# Patient Record
Sex: MALE | Race: WHITE | NOT HISPANIC OR LATINO | URBAN - METROPOLITAN AREA
[De-identification: names, ages, dates, MRNs, and addresses within clinical notes are randomized per-mention and may not be internally consistent; named-entity substitution may affect disease eponyms.]

---

## 2017-02-05 ENCOUNTER — EMERGENCY (EMERGENCY)
Facility: HOSPITAL | Age: 57
LOS: 1 days | Discharge: HOME | End: 2017-02-05
Attending: EMERGENCY MEDICINE
Payer: COMMERCIAL

## 2017-02-05 VITALS
RESPIRATION RATE: 20 BRPM | OXYGEN SATURATION: 97 % | SYSTOLIC BLOOD PRESSURE: 115 MMHG | TEMPERATURE: 98 F | HEART RATE: 94 BPM | WEIGHT: 169.98 LBS | HEIGHT: 70 IN | DIASTOLIC BLOOD PRESSURE: 77 MMHG

## 2017-02-05 VITALS
RESPIRATION RATE: 17 BRPM | TEMPERATURE: 98 F | HEART RATE: 105 BPM | DIASTOLIC BLOOD PRESSURE: 63 MMHG | SYSTOLIC BLOOD PRESSURE: 166 MMHG | OXYGEN SATURATION: 96 %

## 2017-02-05 DIAGNOSIS — Z98.89 OTHER SPECIFIED POSTPROCEDURAL STATES: Chronic | ICD-10-CM

## 2017-02-05 DIAGNOSIS — R06.2 WHEEZING: ICD-10-CM

## 2017-02-05 DIAGNOSIS — Z87.19 PERSONAL HISTORY OF OTHER DISEASES OF THE DIGESTIVE SYSTEM: Chronic | ICD-10-CM

## 2017-02-05 DIAGNOSIS — Z98.890 OTHER SPECIFIED POSTPROCEDURAL STATES: ICD-10-CM

## 2017-02-05 DIAGNOSIS — E78.00 PURE HYPERCHOLESTEROLEMIA, UNSPECIFIED: ICD-10-CM

## 2017-02-05 DIAGNOSIS — Z95.828 PRESENCE OF OTHER VASCULAR IMPLANTS AND GRAFTS: Chronic | ICD-10-CM

## 2017-02-05 DIAGNOSIS — Z90.49 ACQUIRED ABSENCE OF OTHER SPECIFIED PARTS OF DIGESTIVE TRACT: ICD-10-CM

## 2017-02-05 DIAGNOSIS — E86.0 DEHYDRATION: ICD-10-CM

## 2017-02-05 DIAGNOSIS — Z90.49 ACQUIRED ABSENCE OF OTHER SPECIFIED PARTS OF DIGESTIVE TRACT: Chronic | ICD-10-CM

## 2017-02-05 DIAGNOSIS — I71.4 ABDOMINAL AORTIC ANEURYSM, WITHOUT RUPTURE: Chronic | ICD-10-CM

## 2017-02-05 DIAGNOSIS — Z79.82 LONG TERM (CURRENT) USE OF ASPIRIN: ICD-10-CM

## 2017-02-05 DIAGNOSIS — K66.0 PERITONEAL ADHESIONS (POSTPROCEDURAL) (POSTINFECTION): Chronic | ICD-10-CM

## 2017-02-05 DIAGNOSIS — R42 DIZZINESS AND GIDDINESS: ICD-10-CM

## 2017-02-05 DIAGNOSIS — R05 COUGH: ICD-10-CM

## 2017-02-05 DIAGNOSIS — I10 ESSENTIAL (PRIMARY) HYPERTENSION: ICD-10-CM

## 2017-02-05 LAB
ALBUMIN SERPL ELPH-MCNC: 4.5 G/DL — SIGNIFICANT CHANGE UP (ref 3.3–5.2)
ALP SERPL-CCNC: 103 U/L — SIGNIFICANT CHANGE UP (ref 40–120)
ALT FLD-CCNC: 13 U/L — SIGNIFICANT CHANGE UP
ANION GAP SERPL CALC-SCNC: 15 MMOL/L — SIGNIFICANT CHANGE UP (ref 5–17)
AST SERPL-CCNC: 19 U/L — SIGNIFICANT CHANGE UP
BASOPHILS # BLD AUTO: 0 K/UL — SIGNIFICANT CHANGE UP (ref 0–0.2)
BASOPHILS NFR BLD AUTO: 0.2 % — SIGNIFICANT CHANGE UP (ref 0–2)
BILIRUB SERPL-MCNC: 0.5 MG/DL — SIGNIFICANT CHANGE UP (ref 0.4–2)
BUN SERPL-MCNC: 22 MG/DL — HIGH (ref 8–20)
CALCIUM SERPL-MCNC: 9.7 MG/DL — SIGNIFICANT CHANGE UP (ref 8.6–10.2)
CHLORIDE SERPL-SCNC: 101 MMOL/L — SIGNIFICANT CHANGE UP (ref 98–107)
CO2 SERPL-SCNC: 23 MMOL/L — SIGNIFICANT CHANGE UP (ref 22–29)
CREAT SERPL-MCNC: 0.96 MG/DL — SIGNIFICANT CHANGE UP (ref 0.5–1.3)
EOSINOPHIL # BLD AUTO: 0.2 K/UL — SIGNIFICANT CHANGE UP (ref 0–0.5)
EOSINOPHIL NFR BLD AUTO: 1 % — SIGNIFICANT CHANGE UP (ref 0–5)
GLUCOSE SERPL-MCNC: 123 MG/DL — HIGH (ref 70–115)
HCT VFR BLD CALC: 42.3 % — SIGNIFICANT CHANGE UP (ref 42–52)
HGB BLD-MCNC: 14.3 G/DL — SIGNIFICANT CHANGE UP (ref 14–18)
LYMPHOCYTES # BLD AUTO: 0.8 K/UL — LOW (ref 1–4.8)
LYMPHOCYTES # BLD AUTO: 5.6 % — LOW (ref 20–55)
MAGNESIUM SERPL-MCNC: 2 MG/DL — SIGNIFICANT CHANGE UP (ref 1.8–2.5)
MCHC RBC-ENTMCNC: 31 PG — SIGNIFICANT CHANGE UP (ref 27–31)
MCHC RBC-ENTMCNC: 33.8 G/DL — SIGNIFICANT CHANGE UP (ref 32–36)
MCV RBC AUTO: 91.6 FL — SIGNIFICANT CHANGE UP (ref 80–94)
MONOCYTES # BLD AUTO: 0.8 K/UL — SIGNIFICANT CHANGE UP (ref 0–0.8)
MONOCYTES NFR BLD AUTO: 5.4 % — SIGNIFICANT CHANGE UP (ref 3–10)
NEUTROPHILS # BLD AUTO: 12.9 K/UL — HIGH (ref 1.8–8)
NEUTROPHILS NFR BLD AUTO: 87.6 % — HIGH (ref 37–73)
PLATELET # BLD AUTO: 221 K/UL — SIGNIFICANT CHANGE UP (ref 150–400)
POTASSIUM SERPL-MCNC: 4.7 MMOL/L — SIGNIFICANT CHANGE UP (ref 3.5–5.3)
POTASSIUM SERPL-SCNC: 4.7 MMOL/L — SIGNIFICANT CHANGE UP (ref 3.5–5.3)
PROT SERPL-MCNC: 7.8 G/DL — SIGNIFICANT CHANGE UP (ref 6.6–8.7)
RBC # BLD: 4.62 M/UL — SIGNIFICANT CHANGE UP (ref 4.6–6.2)
RBC # FLD: 14.3 % — SIGNIFICANT CHANGE UP (ref 11–15.6)
SODIUM SERPL-SCNC: 139 MMOL/L — SIGNIFICANT CHANGE UP (ref 135–145)
TROPONIN T SERPL-MCNC: <0.01 NG/ML — SIGNIFICANT CHANGE UP (ref 0–0.06)
TROPONIN T SERPL-MCNC: <0.01 NG/ML — SIGNIFICANT CHANGE UP (ref 0–0.06)
WBC # BLD: 14.72 K/UL — HIGH (ref 4.8–10.8)
WBC # FLD AUTO: 14.72 K/UL — HIGH (ref 4.8–10.8)

## 2017-02-05 PROCEDURE — 71250 CT THORAX DX C-: CPT

## 2017-02-05 PROCEDURE — 99284 EMERGENCY DEPT VISIT MOD MDM: CPT | Mod: 25

## 2017-02-05 PROCEDURE — 99285 EMERGENCY DEPT VISIT HI MDM: CPT | Mod: 25

## 2017-02-05 PROCEDURE — 71250 CT THORAX DX C-: CPT | Mod: 26

## 2017-02-05 PROCEDURE — 36415 COLL VENOUS BLD VENIPUNCTURE: CPT

## 2017-02-05 PROCEDURE — 93010 ELECTROCARDIOGRAM REPORT: CPT

## 2017-02-05 PROCEDURE — 83735 ASSAY OF MAGNESIUM: CPT

## 2017-02-05 PROCEDURE — 71020: CPT | Mod: 26

## 2017-02-05 PROCEDURE — 84484 ASSAY OF TROPONIN QUANT: CPT

## 2017-02-05 PROCEDURE — 94640 AIRWAY INHALATION TREATMENT: CPT

## 2017-02-05 PROCEDURE — 85027 COMPLETE CBC AUTOMATED: CPT

## 2017-02-05 PROCEDURE — 93005 ELECTROCARDIOGRAM TRACING: CPT

## 2017-02-05 PROCEDURE — 80053 COMPREHEN METABOLIC PANEL: CPT

## 2017-02-05 PROCEDURE — 96374 THER/PROPH/DIAG INJ IV PUSH: CPT

## 2017-02-05 PROCEDURE — 71046 X-RAY EXAM CHEST 2 VIEWS: CPT

## 2017-02-05 RX ORDER — SODIUM CHLORIDE 9 MG/ML
2000 INJECTION INTRAMUSCULAR; INTRAVENOUS; SUBCUTANEOUS ONCE
Qty: 0 | Refills: 0 | Status: COMPLETED | OUTPATIENT
Start: 2017-02-05 | End: 2017-02-05

## 2017-02-05 RX ORDER — SODIUM CHLORIDE 9 MG/ML
2000 INJECTION INTRAMUSCULAR; INTRAVENOUS; SUBCUTANEOUS ONCE
Qty: 0 | Refills: 0 | Status: DISCONTINUED | OUTPATIENT
Start: 2017-02-05 | End: 2017-02-05

## 2017-02-05 RX ORDER — NEBIVOLOL HYDROCHLORIDE 5 MG/1
1 TABLET ORAL
Qty: 0 | Refills: 0 | COMMUNITY

## 2017-02-05 RX ORDER — IPRATROPIUM/ALBUTEROL SULFATE 18-103MCG
3 AEROSOL WITH ADAPTER (GRAM) INHALATION ONCE
Qty: 0 | Refills: 0 | Status: COMPLETED | OUTPATIENT
Start: 2017-02-05 | End: 2017-02-05

## 2017-02-05 RX ORDER — METOCLOPRAMIDE HCL 10 MG
10 TABLET ORAL ONCE
Qty: 0 | Refills: 0 | Status: COMPLETED | OUTPATIENT
Start: 2017-02-05 | End: 2017-02-05

## 2017-02-05 RX ORDER — AMLODIPINE BESYLATE 2.5 MG/1
1 TABLET ORAL
Qty: 0 | Refills: 0 | COMMUNITY

## 2017-02-05 RX ORDER — MECLIZINE HCL 12.5 MG
25 TABLET ORAL ONCE
Qty: 0 | Refills: 0 | Status: COMPLETED | OUTPATIENT
Start: 2017-02-05 | End: 2017-02-05

## 2017-02-05 RX ADMIN — SODIUM CHLORIDE 2000 MILLILITER(S): 9 INJECTION INTRAMUSCULAR; INTRAVENOUS; SUBCUTANEOUS at 06:05

## 2017-02-05 RX ADMIN — Medication 10 MILLIGRAM(S): at 09:30

## 2017-02-05 RX ADMIN — Medication 25 MILLIGRAM(S): at 10:09

## 2017-02-05 RX ADMIN — Medication 3 MILLILITER(S): at 06:08

## 2017-02-05 NOTE — ED PROVIDER NOTE - MEDICAL DECISION MAKING DETAILS
labs, ekg, ivf, med neb, reassess labs, ekg, ivf, med neb, reassess, trop x2 labs, ekg, ivf, med neb, reassess, trop x2  dr dallas reassessment no lateralizng symptoms neuro exam showing neuro: CN II - XII intact, EOMI, PERRL, no papilledema, 5/5 muscle strength x 4 extremities, no sensory deficits, 2+ dtr globally, negative babinski, no ataxic gait, normal LA and FNT, normal romberg   slight tachycardia likely dehydration no chest pain or palpitation return to ed for intractable HA persistent vomigin or new onset motor or senory deficits also discussed cardiac workup as follows DDX considered: stable angina, aortic dissection, pericarditis, pneumonia, pe, pts, chest wall pain, esophageal spasm and abdominal emergencies. I have discussed with patient with negative troponin and normal ekg their 28 day cardiac risk and they have agreed to outpt cardio f/ut his week without fail. asa per day until then. return to the ed immediately for any intractable chest pain or sob. pt agrees to plan of care

## 2017-02-05 NOTE — ED ADULT NURSE NOTE - PSH
AAA (abdominal aortic aneurysm)  Endurant II Abdominal l Stent Med[employer] Graft Implanted  Abdominal adhesions  Around small bowel  H/O cardiac catheterization    H/O inguinal hernia  x2  History of extremity bypass graft  Right  S/P appendectomy

## 2017-02-05 NOTE — ED ADULT NURSE NOTE - OBJECTIVE STATEMENT
Pt here c/o lightheadedness, dizziness, left leg shakiness, and vomiting stay it started about 0215 this morning,  A&ox3, no acute cardiac and respiratory distress, breathing even and unlabored

## 2017-02-05 NOTE — ED PROVIDER NOTE - PMH
AAA (abdominal aortic aneurysm)    Alcoholism /alcohol abuse  Last drink 2010  Aneurysm artery, femoral  left  Coronary artery disease involving native coronary artery of native heart without angina pectoris  PCI at [facility]  Hypercholesterolemia    Hypertension

## 2017-02-05 NOTE — ED PROVIDER NOTE - OBJECTIVE STATEMENT
pt p/w with lightheadedness and wheezing. pt sx are moderate in severity, constant, nothing makese pt p/w with lightheadedness and wheezing. pt sx are moderate in severity, constant, nothing makes better or worse. pt has h/o similar sx. sx started slowly. pt has not been drinking much fluid. no drug use pt p/w with lightheadedness and wheezing. pt sx are moderate in severity, constant, nothing makes better or worse. pt has h/o similar sx. sx started slowly. pt has not been drinking much fluid. no drug use. pt was also vomitting. no abd pain

## 2017-02-05 NOTE — ED PROVIDER NOTE - PHYSICAL EXAMINATION
VS: reviewed in triage note...  HEENT: dry  CV:s1,s2 no m/r/g  Lungs: cta b/l, no r/r/w  Abd: soft, nt/nd, +bs  EXT: no c/c/e  Neuro:no focal defecits  skin: no rash  Pulses: dpp, pt 2+ b/l

## 2017-02-05 NOTE — ED PROVIDER NOTE - PROGRESS NOTE DETAILS
pt feeling much better after iVF . will d/c with cardiology and pmd f/u. pt has nebulizer at home and pt advised to continue with meds pt feeling much better after iVF , antiemetics, and pt will f/u with cardiology pt feeling much better after iVF , antiemetics, and pt will f/u with cardiology, pt signed out to

## 2017-02-05 NOTE — ED ADULT NURSE NOTE - PMH
AAA (abdominal aortic aneurysm)    Alcoholism /alcohol abuse  Last drink 2010  Aneurysm artery, femoral  left  Coronary artery disease involving native coronary artery of native heart without angina pectoris  PCI at [facility]  Hypercholesterolemia    Hypertension

## 2017-02-05 NOTE — ED ADULT NURSE REASSESSMENT NOTE - NS ED NURSE REASSESS COMMENT FT1
assumed care of pt from RN in ED. pt resting comfortably in cart. breathing si even and unlabored. pt remains on cardiac monitor sinus tachycardia noted with rate of 104. denies chest pain/sob. trop drawn and sent to lab. will continue to monitor and reassess

## 2017-02-05 NOTE — ED ADULT TRIAGE NOTE - CHIEF COMPLAINT QUOTE
Dizziness since 0200, patient reports HX of AAA, cardiac stent, denies  CP, resp even/unlabored. lungs CTAB

## 2025-05-24 ENCOUNTER — RESULT REVIEW (OUTPATIENT)
Age: 65
End: 2025-05-24

## 2025-05-24 ENCOUNTER — INPATIENT (INPATIENT)
Facility: HOSPITAL | Age: 65
LOS: 9 days | Discharge: HOME | DRG: 293 | End: 2025-06-03
Attending: STUDENT IN AN ORGANIZED HEALTH CARE EDUCATION/TRAINING PROGRAM | Admitting: STUDENT IN AN ORGANIZED HEALTH CARE EDUCATION/TRAINING PROGRAM
Payer: MEDICARE

## 2025-05-24 VITALS
HEART RATE: 101 BPM | RESPIRATION RATE: 18 BRPM | TEMPERATURE: 98 F | WEIGHT: 199.96 LBS | DIASTOLIC BLOOD PRESSURE: 82 MMHG | SYSTOLIC BLOOD PRESSURE: 168 MMHG | OXYGEN SATURATION: 94 %

## 2025-05-24 DIAGNOSIS — I50.9 HEART FAILURE, UNSPECIFIED: ICD-10-CM

## 2025-05-24 DIAGNOSIS — Z98.89 OTHER SPECIFIED POSTPROCEDURAL STATES: Chronic | ICD-10-CM

## 2025-05-24 DIAGNOSIS — K66.0 PERITONEAL ADHESIONS (POSTPROCEDURAL) (POSTINFECTION): Chronic | ICD-10-CM

## 2025-05-24 DIAGNOSIS — Z90.49 ACQUIRED ABSENCE OF OTHER SPECIFIED PARTS OF DIGESTIVE TRACT: Chronic | ICD-10-CM

## 2025-05-24 DIAGNOSIS — Z95.828 PRESENCE OF OTHER VASCULAR IMPLANTS AND GRAFTS: Chronic | ICD-10-CM

## 2025-05-24 DIAGNOSIS — R93.89 ABNORMAL FINDINGS ON DIAGNOSTIC IMAGING OF OTHER SPECIFIED BODY STRUCTURES: ICD-10-CM

## 2025-05-24 DIAGNOSIS — I71.4 ABDOMINAL AORTIC ANEURYSM, WITHOUT RUPTURE: Chronic | ICD-10-CM

## 2025-05-24 DIAGNOSIS — Z87.19 PERSONAL HISTORY OF OTHER DISEASES OF THE DIGESTIVE SYSTEM: Chronic | ICD-10-CM

## 2025-05-24 LAB
ALBUMIN SERPL ELPH-MCNC: 4 G/DL — SIGNIFICANT CHANGE UP (ref 3.3–5.2)
ALP SERPL-CCNC: 123 U/L — HIGH (ref 40–120)
ALT FLD-CCNC: 12 U/L — SIGNIFICANT CHANGE UP
ANION GAP SERPL CALC-SCNC: 13 MMOL/L — SIGNIFICANT CHANGE UP (ref 5–17)
APPEARANCE UR: CLEAR — SIGNIFICANT CHANGE UP
AST SERPL-CCNC: 19 U/L — SIGNIFICANT CHANGE UP
BACTERIA # UR AUTO: NEGATIVE /HPF — SIGNIFICANT CHANGE UP
BASOPHILS # BLD AUTO: 0.07 K/UL — SIGNIFICANT CHANGE UP (ref 0–0.2)
BASOPHILS NFR BLD AUTO: 1.1 % — SIGNIFICANT CHANGE UP (ref 0–2)
BILIRUB SERPL-MCNC: <0.2 MG/DL — LOW (ref 0.4–2)
BILIRUB UR-MCNC: NEGATIVE — SIGNIFICANT CHANGE UP
BUN SERPL-MCNC: 19 MG/DL — SIGNIFICANT CHANGE UP (ref 8–20)
CALCIUM SERPL-MCNC: 8.6 MG/DL — SIGNIFICANT CHANGE UP (ref 8.4–10.5)
CAST: 0 /LPF — SIGNIFICANT CHANGE UP (ref 0–4)
CHLORIDE SERPL-SCNC: 104 MMOL/L — SIGNIFICANT CHANGE UP (ref 96–108)
CO2 SERPL-SCNC: 21 MMOL/L — LOW (ref 22–29)
COLOR SPEC: YELLOW — SIGNIFICANT CHANGE UP
CREAT SERPL-MCNC: 1.03 MG/DL — SIGNIFICANT CHANGE UP (ref 0.5–1.3)
D DIMER BLD IA.RAPID-MCNC: 587 NG/ML DDU — HIGH
DIFF PNL FLD: NEGATIVE — SIGNIFICANT CHANGE UP
EGFR: 81 ML/MIN/1.73M2 — SIGNIFICANT CHANGE UP
EGFR: 81 ML/MIN/1.73M2 — SIGNIFICANT CHANGE UP
EOSINOPHIL # BLD AUTO: 0.25 K/UL — SIGNIFICANT CHANGE UP (ref 0–0.5)
EOSINOPHIL NFR BLD AUTO: 3.9 % — SIGNIFICANT CHANGE UP (ref 0–6)
FERRITIN SERPL-MCNC: 16 NG/ML — LOW (ref 30–400)
GLUCOSE SERPL-MCNC: 104 MG/DL — HIGH (ref 70–99)
GLUCOSE UR QL: NEGATIVE MG/DL — SIGNIFICANT CHANGE UP
HCT VFR BLD CALC: 29.3 % — LOW (ref 39–50)
HGB BLD-MCNC: 8.8 G/DL — LOW (ref 13–17)
IMM GRANULOCYTES # BLD AUTO: 0.02 K/UL — SIGNIFICANT CHANGE UP (ref 0–0.07)
IMM GRANULOCYTES NFR BLD AUTO: 0.3 % — SIGNIFICANT CHANGE UP (ref 0–0.9)
KETONES UR QL: NEGATIVE MG/DL — SIGNIFICANT CHANGE UP
LEUKOCYTE ESTERASE UR-ACNC: NEGATIVE — SIGNIFICANT CHANGE UP
LYMPHOCYTES # BLD AUTO: 1.44 K/UL — SIGNIFICANT CHANGE UP (ref 1–3.3)
LYMPHOCYTES NFR BLD AUTO: 22.4 % — SIGNIFICANT CHANGE UP (ref 13–44)
MCHC RBC-ENTMCNC: 23.8 PG — LOW (ref 27–34)
MCHC RBC-ENTMCNC: 30 G/DL — LOW (ref 32–36)
MCV RBC AUTO: 79.4 FL — LOW (ref 80–100)
MONOCYTES # BLD AUTO: 0.73 K/UL — SIGNIFICANT CHANGE UP (ref 0–0.9)
MONOCYTES NFR BLD AUTO: 11.4 % — SIGNIFICANT CHANGE UP (ref 2–14)
NEUTROPHILS # BLD AUTO: 3.91 K/UL — SIGNIFICANT CHANGE UP (ref 1.8–7.4)
NEUTROPHILS NFR BLD AUTO: 60.9 % — SIGNIFICANT CHANGE UP (ref 43–77)
NITRITE UR-MCNC: NEGATIVE — SIGNIFICANT CHANGE UP
NRBC # BLD AUTO: 0 K/UL — SIGNIFICANT CHANGE UP (ref 0–0)
NRBC # FLD: 0 K/UL — SIGNIFICANT CHANGE UP (ref 0–0)
NRBC BLD AUTO-RTO: 0 /100 WBCS — SIGNIFICANT CHANGE UP (ref 0–0)
NT-PROBNP SERPL-SCNC: 2910 PG/ML — HIGH (ref 0–300)
PH UR: 6.5 — SIGNIFICANT CHANGE UP (ref 5–8)
PLATELET # BLD AUTO: 238 K/UL — SIGNIFICANT CHANGE UP (ref 150–400)
PMV BLD: 9.9 FL — SIGNIFICANT CHANGE UP (ref 7–13)
POTASSIUM SERPL-MCNC: 3.9 MMOL/L — SIGNIFICANT CHANGE UP (ref 3.5–5.3)
POTASSIUM SERPL-SCNC: 3.9 MMOL/L — SIGNIFICANT CHANGE UP (ref 3.5–5.3)
PROT SERPL-MCNC: 6.6 G/DL — SIGNIFICANT CHANGE UP (ref 6.6–8.7)
PROT UR-MCNC: NEGATIVE MG/DL — SIGNIFICANT CHANGE UP
RBC # BLD: 3.69 M/UL — LOW (ref 4.2–5.8)
RBC # FLD: 16.9 % — HIGH (ref 10.3–14.5)
RBC CASTS # UR COMP ASSIST: 0 /HPF — SIGNIFICANT CHANGE UP (ref 0–4)
SODIUM SERPL-SCNC: 138 MMOL/L — SIGNIFICANT CHANGE UP (ref 135–145)
SP GR SPEC: 1.01 — SIGNIFICANT CHANGE UP (ref 1–1.03)
SQUAMOUS # UR AUTO: 0 /HPF — SIGNIFICANT CHANGE UP (ref 0–5)
TROPONIN T, HIGH SENSITIVITY RESULT: 105 NG/L — HIGH (ref 0–51)
TROPONIN T, HIGH SENSITIVITY RESULT: 113 NG/L — HIGH (ref 0–51)
TROPONIN T, HIGH SENSITIVITY RESULT: 204 NG/L — HIGH (ref 0–51)
UROBILINOGEN FLD QL: 1 MG/DL — SIGNIFICANT CHANGE UP (ref 0.2–1)
WBC # BLD: 6.42 K/UL — SIGNIFICANT CHANGE UP (ref 3.8–10.5)
WBC # FLD AUTO: 6.42 K/UL — SIGNIFICANT CHANGE UP (ref 3.8–10.5)
WBC UR QL: 0 /HPF — SIGNIFICANT CHANGE UP (ref 0–5)

## 2025-05-24 PROCEDURE — 99285 EMERGENCY DEPT VISIT HI MDM: CPT | Mod: GC

## 2025-05-24 PROCEDURE — 71046 X-RAY EXAM CHEST 2 VIEWS: CPT | Mod: 26

## 2025-05-24 PROCEDURE — 93970 EXTREMITY STUDY: CPT | Mod: 26

## 2025-05-24 PROCEDURE — 99223 1ST HOSP IP/OBS HIGH 75: CPT

## 2025-05-24 PROCEDURE — 99222 1ST HOSP IP/OBS MODERATE 55: CPT

## 2025-05-24 PROCEDURE — 71275 CT ANGIOGRAPHY CHEST: CPT | Mod: 26

## 2025-05-24 PROCEDURE — 93306 TTE W/DOPPLER COMPLETE: CPT | Mod: 26

## 2025-05-24 PROCEDURE — 93010 ELECTROCARDIOGRAM REPORT: CPT

## 2025-05-24 RX ORDER — LISINOPRIL 5 MG/1
10 TABLET ORAL DAILY
Refills: 0 | Status: DISCONTINUED | OUTPATIENT
Start: 2025-05-24 | End: 2025-05-25

## 2025-05-24 RX ORDER — IPRATROPIUM BROMIDE AND ALBUTEROL SULFATE .5; 2.5 MG/3ML; MG/3ML
3 SOLUTION RESPIRATORY (INHALATION)
Refills: 0 | Status: COMPLETED | OUTPATIENT
Start: 2025-05-24 | End: 2025-05-24

## 2025-05-24 RX ORDER — HEPARIN SODIUM 1000 [USP'U]/ML
INJECTION INTRAVENOUS; SUBCUTANEOUS
Qty: 25000 | Refills: 0 | Status: DISCONTINUED | OUTPATIENT
Start: 2025-05-24 | End: 2025-05-27

## 2025-05-24 RX ORDER — ATORVASTATIN CALCIUM 80 MG/1
80 TABLET, FILM COATED ORAL AT BEDTIME
Refills: 0 | Status: DISCONTINUED | OUTPATIENT
Start: 2025-05-24 | End: 2025-06-03

## 2025-05-24 RX ORDER — ACETAMINOPHEN 500 MG/5ML
1000 LIQUID (ML) ORAL ONCE
Refills: 0 | Status: COMPLETED | OUTPATIENT
Start: 2025-05-24 | End: 2025-05-24

## 2025-05-24 RX ORDER — HEPARIN SODIUM 1000 [USP'U]/ML
5600 INJECTION INTRAVENOUS; SUBCUTANEOUS EVERY 6 HOURS
Refills: 0 | Status: DISCONTINUED | OUTPATIENT
Start: 2025-05-24 | End: 2025-05-24

## 2025-05-24 RX ORDER — FUROSEMIDE 10 MG/ML
40 INJECTION INTRAMUSCULAR; INTRAVENOUS DAILY
Refills: 0 | Status: DISCONTINUED | OUTPATIENT
Start: 2025-05-24 | End: 2025-05-26

## 2025-05-24 RX ORDER — MELATONIN 5 MG
3 TABLET ORAL AT BEDTIME
Refills: 0 | Status: DISCONTINUED | OUTPATIENT
Start: 2025-05-24 | End: 2025-06-03

## 2025-05-24 RX ORDER — HEPARIN SODIUM 1000 [USP'U]/ML
5300 INJECTION INTRAVENOUS; SUBCUTANEOUS EVERY 6 HOURS
Refills: 0 | Status: DISCONTINUED | OUTPATIENT
Start: 2025-05-24 | End: 2025-05-29

## 2025-05-24 RX ORDER — PREDNISONE 20 MG/1
40 TABLET ORAL ONCE
Refills: 0 | Status: COMPLETED | OUTPATIENT
Start: 2025-05-24 | End: 2025-05-24

## 2025-05-24 RX ORDER — MAGNESIUM, ALUMINUM HYDROXIDE 200-200 MG
30 TABLET,CHEWABLE ORAL EVERY 4 HOURS
Refills: 0 | Status: DISCONTINUED | OUTPATIENT
Start: 2025-05-24 | End: 2025-06-03

## 2025-05-24 RX ORDER — ACETAMINOPHEN 500 MG/5ML
650 LIQUID (ML) ORAL EVERY 6 HOURS
Refills: 0 | Status: DISCONTINUED | OUTPATIENT
Start: 2025-05-24 | End: 2025-05-30

## 2025-05-24 RX ORDER — FUROSEMIDE 10 MG/ML
40 INJECTION INTRAMUSCULAR; INTRAVENOUS ONCE
Refills: 0 | Status: COMPLETED | OUTPATIENT
Start: 2025-05-24 | End: 2025-05-24

## 2025-05-24 RX ORDER — ASPIRIN 325 MG
324 TABLET ORAL ONCE
Refills: 0 | Status: COMPLETED | OUTPATIENT
Start: 2025-05-24 | End: 2025-05-24

## 2025-05-24 RX ORDER — ONDANSETRON HCL/PF 4 MG/2 ML
4 VIAL (ML) INJECTION EVERY 8 HOURS
Refills: 0 | Status: DISCONTINUED | OUTPATIENT
Start: 2025-05-24 | End: 2025-06-03

## 2025-05-24 RX ORDER — METOPROLOL SUCCINATE 50 MG/1
50 TABLET, EXTENDED RELEASE ORAL
Refills: 0 | Status: DISCONTINUED | OUTPATIENT
Start: 2025-05-24 | End: 2025-05-29

## 2025-05-24 RX ORDER — ATORVASTATIN CALCIUM 80 MG/1
1 TABLET, FILM COATED ORAL
Refills: 0 | DISCHARGE

## 2025-05-24 RX ORDER — HEPARIN SODIUM 1000 [USP'U]/ML
5000 INJECTION INTRAVENOUS; SUBCUTANEOUS ONCE
Refills: 0 | Status: DISCONTINUED | OUTPATIENT
Start: 2025-05-24 | End: 2025-05-24

## 2025-05-24 RX ORDER — HEPARIN SODIUM 1000 [USP'U]/ML
INJECTION INTRAVENOUS; SUBCUTANEOUS
Qty: 25000 | Refills: 0 | Status: DISCONTINUED | OUTPATIENT
Start: 2025-05-24 | End: 2025-05-24

## 2025-05-24 RX ORDER — ASPIRIN 325 MG
81 TABLET ORAL DAILY
Refills: 0 | Status: DISCONTINUED | OUTPATIENT
Start: 2025-05-24 | End: 2025-06-03

## 2025-05-24 RX ADMIN — HEPARIN SODIUM 1000 UNIT(S)/HR: 1000 INJECTION INTRAVENOUS; SUBCUTANEOUS at 17:45

## 2025-05-24 RX ADMIN — FUROSEMIDE 40 MILLIGRAM(S): 10 INJECTION INTRAMUSCULAR; INTRAVENOUS at 09:40

## 2025-05-24 RX ADMIN — Medication 1000 MILLIGRAM(S): at 07:01

## 2025-05-24 RX ADMIN — Medication 324 MILLIGRAM(S): at 07:09

## 2025-05-24 RX ADMIN — METOPROLOL SUCCINATE 50 MILLIGRAM(S): 50 TABLET, EXTENDED RELEASE ORAL at 17:51

## 2025-05-24 RX ADMIN — IPRATROPIUM BROMIDE AND ALBUTEROL SULFATE 3 MILLILITER(S): .5; 2.5 SOLUTION RESPIRATORY (INHALATION) at 07:08

## 2025-05-24 RX ADMIN — IPRATROPIUM BROMIDE AND ALBUTEROL SULFATE 3 MILLILITER(S): .5; 2.5 SOLUTION RESPIRATORY (INHALATION) at 06:50

## 2025-05-24 RX ADMIN — ATORVASTATIN CALCIUM 80 MILLIGRAM(S): 80 TABLET, FILM COATED ORAL at 21:12

## 2025-05-24 RX ADMIN — Medication 400 MILLIGRAM(S): at 06:01

## 2025-05-24 RX ADMIN — Medication 1000 MILLILITER(S): at 06:02

## 2025-05-24 RX ADMIN — PREDNISONE 40 MILLIGRAM(S): 20 TABLET ORAL at 06:50

## 2025-05-24 RX ADMIN — IPRATROPIUM BROMIDE AND ALBUTEROL SULFATE 3 MILLILITER(S): .5; 2.5 SOLUTION RESPIRATORY (INHALATION) at 07:15

## 2025-05-24 NOTE — H&P ADULT - HISTORY OF PRESENT ILLNESS
65 y/o M w/ PMH of COPD (not on home O2), active smoking (2ppd for min of 40pck years), HTN, HLD, CAD s/p PCI, AAA s/p repair, remote hx of etoh use disorder (last drink 2010), HFmrEF (EF 45-50% 2016), psoriasis, PVD s/p RLE bypass (follows w/ vascular) presented to ED overnight c/o substernal chest pressure w/ associated Rt arm pain.  Pt states it woke him up from his sleep.  He had associated sob but denies alleviating/exacerbating factors, palpitations, diaphoresis, near syncope/syncope.  Pt also reports intermittent b/l LE edema R>L.  Pt has nonproductive cough at baseline that is unchanged.  He also orthopnea, abd pain, N/V, diarrhea, fevers, chills, recent travel or sick contacts.  Pt also reports chronic RLE pain that has been worsening intermittently over the past few months and follows w/ vascular for it and was sent for outpt CT LE w/ contrast on 5/22 and is awaiting results.

## 2025-05-24 NOTE — H&P ADULT - NSICDXFAMILYHX_GEN_ALL_CORE_FT
FAMILY HISTORY:  Family history of gastric cancer  Family history of hypertension  Family history of leukemia

## 2025-05-24 NOTE — ED PROVIDER NOTE - CLINICAL SUMMARY MEDICAL DECISION MAKING FREE TEXT BOX
64-year-old male 40-80pack year smoker with past medical history of vascular disease, chronic leg pain, hypertension, CAD status post 1 stent placement presents to the ED for chest pain who is not compliant with his medication for a year who is hemodynamically stable, saturating well on room air. Will do Chest pain work up, cxr, d-dimer, ekg 64-year-old male 40-80pack year smoker with past medical history of vascular disease, chronic leg pain, hypertension, CAD status post 1 stent placement presents to the ED for chest pain who is not compliant with his medication for a year who is hemodynamically stable, saturating well on room air. Will do Chest pain work up, cxr, d-dimer, ekg.    MD Gildardo: Sign out received from previous MD/DO.  Initial Trope 105, repeat Trope 113.  BNP elevated to 2910.  Cardiology consulted for evaluation.  Patient having persistent tachycardia CTA ordered for PE rule out.  CTA negative for acute emboli but showed saccular aneurysm versus ulcer on thoracic aorta.  CT surgery consulted for evaluation; recommending no surgical intervention at this time.  Patient will be admitted for further care. 64-year-old male 40-80pack year smoker with past medical history of vascular disease, chronic leg pain, hypertension, CAD status post 1 stent placement presents to the ED for chest pain who is not compliant with his medication for a year who is hemodynamically stable, saturating well on room air. Will do Chest pain work up, cxr, d-dimer, ekg.    MD Gildardo: Sign out received from previous MD/DO.  Initial Trop 105, repeat Trop 113.  BNP elevated to 2910.  Cardiology consulted for evaluation.  Patient having persistent tachycardia; CTA ordered for PE rule out.  CTA negative for acute emboli but showed saccular aneurysm versus ulcer on thoracic aorta.  CT surgery consulted for evaluation; recommending no surgical intervention at this time.  Patient will be admitted for further care.

## 2025-05-24 NOTE — H&P ADULT - NSICDXPASTMEDICALHX_GEN_ALL_CORE_FT
PAST MEDICAL HISTORY:  AAA (abdominal aortic aneurysm)     Alcoholism /alcohol abuse Last drink 2010    Aneurysm artery, femoral left    Coronary artery disease involving native coronary artery of native heart without angina pectoris PCI at [facility]    Hypercholesterolemia     Hypertension

## 2025-05-24 NOTE — ED PROVIDER NOTE - OBJECTIVE STATEMENT
63yo M PMHx 64-year-old male with past medical history of vascular disease, chronic leg pain, hypertension, CAD status post 1 stent placement presents to the ED for chest pain.  Patient states that starting around 2:30 in the morning he had trouble going to sleep due to his leg cramps but began to have chest discomfort in the middle of his chest, with associated right arm radiation. Patient states that hasn't taken any medication for his heart or for his legs for the last year because he hasn't been able to see his doctor. Patient 64-year-old male with past medical history of vascular disease, chronic leg pain, hypertension, CAD status post 1 stent placement presents to the ED for chest pain.  Patient states that starting around 2:30 in the morning he had trouble going to sleep due to his leg cramps but began to have chest discomfort in the middle of his chest, with associated right arm radiation. Patient states that hasn't taken any medication for his heart or for his legs for the last year because he hasn't been able to see his doctor. Patient sees Dr. Luis Magaña from [facility] cardiology. Smoking for 40 years 1-2 packs a day.

## 2025-05-24 NOTE — H&P ADULT - ASSESSMENT
63 y/o M w/ PMH of COPD (not on home O2), active smoking (2ppd for min of 40pck years), HTN, HLD, CAD s/p PCI, AAA s/p repair, remote hx of etoh use disorder (last drink 2010), HFmrEF (EF 45-50% 2016), psoriasis, PVD s/p RLE bypass (follows w/ vascular) presented to ED overnight c/o substernal chest pressure w/ associated Rt arm pain.  In ED pt initially placed on 6L NC but no reported hypoxia.  Now satting well on room air.  Clinically near evolemic at this time and not bronchospastic on exam. Initial w/u significant for Hb 8.8, BNP >2K, trops 105-->113, Ddimer 587, EKG w/ no acute ischemic changes initially but repeat EKG no w/ dynamic changes .   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 The thoracic aorta measures 3.6 cm in diameter in this location. Significant stenosis of the proximal SMA suspected. Partially imaged 4.2 cm abdominal aortic aneurysm with peripheral mural thrombus.  s/p 40mg IV lasix, 324mg ASA, 40mg po prednisone and 1L NS given in ED.  Cardio and CTSx consulted.  Will admit for acute decompensated HF and r/o ACS.         63 y/o M w/ PMH of COPD (not on home O2), active smoking (2ppd for min of 40pck years), HTN, HLD, CAD s/p PCI, AAA s/p repair, remote hx of etoh use disorder (last drink 2010), HFmrEF (EF 45-50% 2016), psoriasis, PVD s/p RLE bypass (follows w/ vascular) presented to ED overnight c/o substernal chest pressure w/ associated Rt arm pain.  In ED pt initially placed on 6L NC but no reported hypoxia.  Now satting well on room air.  Clinically near euvolemic at this time and not bronchospastic on exam. Initial w/u significant for Hb 8.8, BNP >2K, trops 105-->113, Ddimer 587, EKG w/ no acute ischemic changes initially but repeat EKG now w/ ST depressions in I, II and V5.   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 The thoracic aorta measures 3.6 cm in diameter in this location. Significant stenosis of the proximal SMA suspected. Partially imaged 4.2 cm abdominal aortic aneurysm with peripheral mural thrombus.  s/p 40mg IV lasix, 324mg ASA, 40mg po prednisone and 1L NS given in ED.  Cardio and CTSx consulted.  Will admit for acute decompensated HF and r/o ACS.        Acute hypoxic respiratory failure 2/2 acute decomponsated HFmrEF  - Clinically near euvolemic at this time   - Initially requiring supplemental O2 but s/p IV lasix now satting well ORA  - Goal O2 sat 88-92% in light of underlying COPD  - CXR w/ pulm vasc congestion and CT chest w/ small b/l pleural effusions   - BNP >2K and trops 105-->113  - 1st EKG w/ no acute ischemic changes but repeat now w/ EKG changes   - Ddimer 587 but CTA chest w/ no evidence of PE  - s/p 40mg IV lasix but also 1L NS in ED   - TTE ordered to reassess EF, WMA, and valves   - Monitor daily weights, I/O's and fluid restrict   - Cardio (mirella) consulted   - Monitor on telemetry       Type II MI, r/o ACS  - CP resolved but has significant cardiac risk factors including smoking hx, AAA and PVD   - BNP >2K and trops 105-->113  - Stat trop pending and repeat EKG ordered and repeat EKG w/ new ST depressions in I, II and V5  - TTE, lipid panel and a1c ordered  - s/p 324mg ASA and 40mg IV lasix in ED  - c/w IV diuresis and transition to po once euvolemic   - c/w ASA and high intensity statin therapy   - Resume ACEI and start on BB   - Per discussion w/ CTSx ok to heperinize pt for ACS and furthermore penetrating ulcer would not cause ST changes  - Monitor on telemetry   - Cardio (mirella) consulted       Microcytic anemia   - Last blood work was from 2016 at which time pt noted to be anemic w/ baseline Hb 9-11  - Currently Hb 8.8 but could be lower from dilution in settiong of vol overload   - Hemodynamically stable at this time w/ no active bleeding reported however is an active smoker and at risk of malignancies   - Will check iron, b12, folate   - Refer to GI on d/c for outpt screening colonsocopy  - Monitor CBC and transfuse for Hb<8      PVD s/p RLE bypass  - Has chronic RLE pain that has been progressively worsening   - s/p CT RLE w/ contrast on 5/22 ordered by vascular and result pending   - Currently pulses palpable and RLE warm to touch   - Will get doppler to r/o VTE       Incidental CTA chest finding   - Found to have saccular aneurysm versus penetrating ulcer of the proximal descending thoracic aorta near left subclavian artery takeoff, thoracic aorta measures 3.6 cm in diameter in this location, significant stenosis of the proximal SMA suspected. Partially imaged 4.2 cm abdominal aortic aneurysm with peripheral mural thrombus.  - CTSx consulted and recommended high dose statin and BP control   - Per discussion w/ CTSx ok to heperinize pt for ACS and furthermore penetrating ulcer would not cause ST changes  - Also recommended to get repeat CT in 3 months and f/u w/ Dr. gill outpt but no acute surgical intervention warrented at this time        HTN / HLD  - Resume home ACEI and high intenstiy statin       Nicotine dependence  - prn nicotine patch   - Couseled on abstaining from smoking       VTE ppx:  heparin gtt     Dispo: Acute.  Low threshold for upgrade if decompensates.

## 2025-05-24 NOTE — CONSULT NOTE ADULT - SUBJECTIVE AND OBJECTIVE BOX
[place] [facility] - [facility], THE HEART CENTER                                   [address] [place] [address]                                                      PHONE: [phone]                                                         FAX: [phone]  [email or web address]  ---------------------------------------------------------------------------------------------------------------------------------    Reason for Consult: CP    HPI:  JULIO CESAR JONES is an 64y Male active smoker with Hx of CAD S/P PCI BMS Prox LAD 2016 RESIDUAL 80% prox RCA 60% Ramus PVD S/P Endovascular AAA repair , S/P L femoral aneurysm repair s/p Bypass of L commom femoral artery, moderate carotid disease followed by Dr West COPD Hx of ETOH abuse HCL HTN   Hx of prior Endocarditis AV treated with IV ATB therapy recently evaluated at [facility] for initial cardiology eval by Dr Luis Magaña  Presented to [facility] c/o CP that starting around 2:30 in the morning he had trouble going to sleep due to his leg cramps but began to have chest discomfort in the middle of his chest, with associated right arm radiation.  Found with a saccular aneurysm vs penetrating ulcer in his descending aorta, seen by CT Surgery at the ER  Trop I mildly elevated BNP elevated CT evidence of pulmonary edema, no PE    < from: CT Angio Chest PE Protocol w/ IV Cont (25 @ 08:26) >  Saccular aneurysm versus penetrating ulcer of the proximal descending   thoracic aorta near left subclavian artery takeoff. The thoracic aorta   measures 3.6 cm in diameter in this location. Significant stenosis of the   proximal SMA suspected. Partially imaged 4.2 cm abdominal aortic aneurysm   with peripheral mural thrombus.     < end of copied text >    PAST MEDICAL & SURGICAL HISTORY:  Hypertension      Hypercholesterolemia      AAA (abdominal aortic aneurysm)      Alcoholism /alcohol abuse  Last drink       Aneurysm artery, femoral  left      Coronary artery disease involving native coronary artery of native heart without angina pectoris  [facility]      AAA (abdominal aortic aneurysm)  Endurant II Abdominal l Stent Medtronic Graft Implanted      History of extremity bypass graft  Right      H/O inguinal hernia  x2      S/P appendectomy      Abdominal adhesions  Around small bowel      H/O cardiac catheterization          No Known Allergies      MEDICATIONS  (STANDING):  aspirin  chewable 81 milliGRAM(s) Oral daily  atorvastatin 80 milliGRAM(s) Oral at bedtime  furosemide   Injectable 40 milliGRAM(s) IV Push daily  lisinopril 10 milliGRAM(s) Oral daily    MEDICATIONS  (PRN):  acetaminophen     Tablet .. 650 milliGRAM(s) Oral every 6 hours PRN Temp greater or equal to 38C (100.4F), Mild Pain (1 - 3)  aluminum hydroxide/magnesium hydroxide/simethicone Suspension 30 milliLiter(s) Oral every 4 hours PRN Dyspepsia  melatonin 3 milliGRAM(s) Oral at bedtime PRN Insomnia  ondansetron Injectable 4 milliGRAM(s) IV Push every 8 hours PRN Nausea and/or Vomiting      Social History:  Cigarettes:                    Alchohol:                 Illicit Drug Abuse:      REVIEW OF SYSTEMS:    Constitutional: No fever, weight loss or fatigue  Eyes: No eye pain, visual disturbances, or discharge  ENMT:  No difficulty hearing, tinnitus, vertigo; No sinus or throat pain  Neck: No pain or stiffness  Respiratory: No cough, wheezing, chills or hemoptysis  Cardiovascular: No chest pain, palpitations, shortness of breath, dizziness or leg swelling  Gastrointestinal: No abdominal or epigastric pain. No nausea, vomiting or hematemesis; No diarrhea or constipation. No melena or hematochezia.  Genitourinary: No dysuria, frequency, hematuria or incontinence  Rectal: No pain, hemorrhoids or incontinence  Neurological: No headaches, memory loss, loss of strength, numbness or tremors  Skin: No itching, burning, rashes or lesions   Lymph Nodes: No enlarged glands  Endocrine: No heat or cold intolerance; No hair loss  Musculoskeletal: No joint pain or swelling; No muscle, back or extremity pain  Psychiatric: No depression, anxiety, mood swings or difficulty sleeping  Heme/Lymph: No easy bruising or bleeding gums  Allergy and Immunologic: No hives or eczema    Vital Signs Last 24 Hrs  T(C): 36.6 (24 May 2025 11:15), Max: 36.6 (24 May 2025 04:14)  T(F): 97.9 (24 May 2025 11:15), Max: 97.9 (24 May 2025 04:14)  HR: 98 (24 May 2025 11:15) (94 - 101)  BP: 159/91 (24 May 2025 11:15) (159/91 - 187/96)  BP(mean): --  RR: 20 (24 May 2025 11:15) (18 - 21)  SpO2: 97% (24 May 2025 11:15) (94% - 98%)    Parameters below as of 24 May 2025 04:14  Patient On (Oxygen Delivery Method): room air      ICU Vital Signs Last 24 Hrs  JULIO CESAR JONES  I&O's Detail    I&O's Summary    Drug Dosing Weight  JULIO CESAR JONES      PHYSICAL EXAM:  General: Appears alert and cooperative.  HEENT: Head; normocephalic, atraumatic.  Eyes: Pupils reactive, cornea wnl.  Neck: Supple, no nodes adenopathy, no NVD or carotid bruit or thyromegaly.  CARDIOVASCULAR: Normal S1 and S2, No murmur, rub, gallop or lift.   LUNGS: No rales, rhonchi or wheeze. Normal breath sounds bilaterally.  ABDOMEN: Soft, nontender without mass or organomegaly. bowel sounds normoactive.  EXTREMITIES: No clubbing, cyanosis or edema. Distal pulses wnl.   SKIN: warm and dry with normal turgor.  NEURO: Alert/oriented x 3/normal motor exam. No pathologic reflexes.    PSYCH: normal affect.      >>> <<<  LABS:                        8.8    6.42  )-----------( 238      ( 24 May 2025 05:50 )             29.3         138  |  104  |  19.0  ----------------------------<  104[H]  3.9   |  21.0[L]  |  1.03    Ca    8.6      24 May 2025 05:50    TPro  6.6  /  Alb  4.0  /  TBili  <0.2[L]  /  DBili  x   /  AST  19  /  ALT  12  /  AlkPhos  123[H]      JULIO CESAR LAPINE        Urinalysis Basic - ( 24 May 2025 07:10 )    Color: Yellow / Appearance: Clear / S.015 / pH: x  Gluc: x / Ketone: x  / Bili: Negative / Urobili: 1.0 mg/dL   Blood: x / Protein: Negative mg/dL / Nitrite: Negative   Leuk Esterase: Negative / RBC: 0 /HPF / WBC 0 /HPF   Sq Epi: x / Non Sq Epi: 0 /HPF / Bacteria: Negative /HPF        RADIOLOGY & ADDITIONAL STUDIES:    INTERPRETATION OF TELEMETRY (personally reviewed):    ECG:    ECHO:    STRESS TEST:    CARDIAC CATHETERIZATION:    ACTIVE PROBLEMS:  HEALTH ISSUES - PROBLEM Dx:  Abnormal CT of the chest            Assessment and Plan:    In summary, JULIO CESAR JONES is an 64y Male with past medical history significant for     Telemetry monitoring  Serial cardiac markers and EKgs  2DEchocardiogram  Continue present cardiac therapy    DEVAUGHN ZALDIVAR MD, FACC, KAMILLE                 [place] [facility] - [facility], THE HEART CENTER                                   [address] [place] [address]                                                      PHONE: [phone]                                                         FAX: [phone]  [email or web address]  ---------------------------------------------------------------------------------------------------------------------------------    Reason for Consult: CP    HPI:  JULIO CESAR JONES is an 64y Male active smoker with Hx of CAD S/P PCI BMS Prox LAD 2016 RESIDUAL 80% prox RCA 60% Ramus PVD S/P Endovascular AAA repair , S/P L femoral aneurysm repair s/p Bypass of L commom femoral artery, moderate carotid disease followed by Dr West COPD Hx of ETOH abuse HCL HTN   Hx of prior Endocarditis AV treated with IV ATB therapy recently evaluated at [facility] for initial cardiology eval by Dr Luis Magaña  Presented to [facility] c/o CP that starting around 2:30 in the morning he had trouble going to sleep due to his leg cramps but began to have chest discomfort in the middle of his chest, with associated right arm radiation.  Found with a saccular aneurysm vs penetrating ulcer in his descending aorta, seen by CT Surgery at the ER  Trop I mildly elevated BNP elevated CT evidence of pulmonary edema, no PE  Presently free of CP only c/o of R leg pain at the time of my evaluation    < from: CT Angio Chest PE Protocol w/ IV Cont (25 @ 08:26) >  Saccular aneurysm versus penetrating ulcer of the proximal descending   thoracic aorta near left subclavian artery takeoff. The thoracic aorta   measures 3.6 cm in diameter in this location. Significant stenosis of the   proximal SMA suspected. Partially imaged 4.2 cm abdominal aortic aneurysm   with peripheral mural thrombus.     < end of copied text >    PAST MEDICAL & SURGICAL HISTORY:  Hypertension      Hypercholesterolemia      AAA (abdominal aortic aneurysm)      Alcoholism /alcohol abuse  Last drink       Aneurysm artery, femoral  left      Coronary artery disease involving native coronary artery of native heart without angina pectoris  PCI at [facility]      AAA (abdominal aortic aneurysm)  Endurant II Abdominal l Stent Medtronic Graft Implanted      History of extremity bypass graft  Right      H/O inguinal hernia  x2      S/P appendectomy      Abdominal adhesions  Around small bowel      H/O cardiac catheterization          No Known Allergies      MEDICATIONS  (STANDING):  aspirin  chewable 81 milliGRAM(s) Oral daily  atorvastatin 80 milliGRAM(s) Oral at bedtime  furosemide   Injectable 40 milliGRAM(s) IV Push daily  lisinopril 10 milliGRAM(s) Oral daily    MEDICATIONS  (PRN):  acetaminophen     Tablet .. 650 milliGRAM(s) Oral every 6 hours PRN Temp greater or equal to 38C (100.4F), Mild Pain (1 - 3)  aluminum hydroxide/magnesium hydroxide/simethicone Suspension 30 milliLiter(s) Oral every 4 hours PRN Dyspepsia  melatonin 3 milliGRAM(s) Oral at bedtime PRN Insomnia  ondansetron Injectable 4 milliGRAM(s) IV Push every 8 hours PRN Nausea and/or Vomiting      Social History:  Cigarettes:                    Alchohol:                 Illicit Drug Abuse:      REVIEW OF SYSTEMS:    Constitutional: No fever, weight loss or fatigue  Eyes: No eye pain, visual disturbances, or discharge  ENMT:  No difficulty hearing, tinnitus, vertigo; No sinus or throat pain  Neck: No pain or stiffness  Respiratory: No cough, wheezing, chills or hemoptysis  Cardiovascular: No chest pain, palpitations, shortness of breath, dizziness or leg swelling  Gastrointestinal: No abdominal or epigastric pain. No nausea, vomiting or hematemesis; No diarrhea or constipation. No melena or hematochezia.  Genitourinary: No dysuria, frequency, hematuria or incontinence  Rectal: No pain, hemorrhoids or incontinence  Neurological: No headaches, memory loss, loss of strength, numbness or tremors  Skin: No itching, burning, rashes or lesions   Lymph Nodes: No enlarged glands  Endocrine: No heat or cold intolerance; No hair loss  Musculoskeletal: No joint pain or swelling; No muscle, back or extremity pain  Psychiatric: No depression, anxiety, mood swings or difficulty sleeping  Heme/Lymph: No easy bruising or bleeding gums  Allergy and Immunologic: No hives or eczema    Vital Signs Last 24 Hrs  T(C): 36.6 (24 May 2025 11:15), Max: 36.6 (24 May 2025 04:14)  T(F): 97.9 (24 May 2025 11:15), Max: 97.9 (24 May 2025 04:14)  HR: 98 (24 May 2025 11:15) (94 - 101)  BP: 159/91 (24 May 2025 11:15) (159/91 - 187/96)  BP(mean): --  RR: 20 (24 May 2025 11:15) (18 - 21)  SpO2: 97% (24 May 2025 11:15) (94% - 98%)    Parameters below as of 24 May 2025 04:14  Patient On (Oxygen Delivery Method): room air      ICU Vital Signs Last 24 Hrs  JULIO CESAR KAREN  I&O's Detail    I&O's Summary    Drug Dosing Weight  JULIO CESAR JONES      PHYSICAL EXAM:  General: Appears alert and cooperative.  HEENT: Head; normocephalic, atraumatic.  Eyes: Pupils reactive, cornea wnl.  Neck: Supple, no nodes adenopathy, no NVD or carotid bruit or thyromegaly.  CARDIOVASCULAR: Normal S1 and S2, No murmur, rub, gallop or lift.   LUNGS: No rales, rhonchi or wheeze. Normal breath sounds bilaterally.  ABDOMEN: Soft, nontender without mass or organomegaly. bowel sounds normoactive.  EXTREMITIES: No clubbing, cyanosis or edema. Distal pulses wnl.   SKIN: warm and dry with normal turgor.  NEURO: Alert/oriented x 3/normal motor exam. No pathologic reflexes.    PSYCH: normal affect.      >>> <<<  LABS:                        8.8    6.42  )-----------( 238      ( 24 May 2025 05:50 )             29.3     05-24    138  |  104  |  19.0  ----------------------------<  104[H]  3.9   |  21.0[L]  |  1.03    Ca    8.6      24 May 2025 05:50    TPro  6.6  /  Alb  4.0  /  TBili  <0.2[L]  /  DBili  x   /  AST  19  /  ALT  12  /  AlkPhos  123[H]  05-24    JULIO CESAR LAPINE        Urinalysis Basic - ( 24 May 2025 07:10 )    Color: Yellow / Appearance: Clear / S.015 / pH: x  Gluc: x / Ketone: x  / Bili: Negative / Urobili: 1.0 mg/dL   Blood: x / Protein: Negative mg/dL / Nitrite: Negative   Leuk Esterase: Negative / RBC: 0 /HPF / WBC 0 /HPF   Sq Epi: x / Non Sq Epi: 0 /HPF / Bacteria: Negative /HPF        RADIOLOGY & ADDITIONAL STUDIES:    INTERPRETATION OF TELEMETRY (personally reviewed):    ECG:         ACTIVE PROBLEMS:  HEALTH ISSUES - PROBLEM Dx:  Abnormal CT of the chest            Assessment and Plan:    In summary, JULIO CESAR JONES is an 64y Male with past medical history significant for     Telemetry monitoring  Serial cardiac markers and EKgs  2DEchocardiogram  Continue present cardiac therapy    DEVAUGHN ZALDIVAR MD, FACC, [facility]                 [place] [facility] - [facility], THE HEART CENTER                                   [address] [place] [address]                                                      PHONE: [phone]                                                         FAX: [phone]  [email or web address]  ---------------------------------------------------------------------------------------------------------------------------------    Reason for Consult: CP    HPI:  JULIO CESAR JONES is an 64y Male active smoker with Hx of CAD S/P PCI BMS Prox LAD 2016 RESIDUAL 80% prox RCA 60% Ramus PVD S/P Endovascular AAA repair , S/P L femoral aneurysm repair s/p Bypass of L common femoral artery, moderate carotid disease followed by Dr West COPD Hx of ETOH abuse HCL HTN   Hx of prior Endocarditis AV treated with IV ATB therapy recently evaluated at [facility] for initial cardiology eval by Dr Luis Magaña  Presented to [facility] c/o CP that starting around 2:30 in the morning he had trouble going to sleep due to his leg cramps but began to have chest discomfort in the middle of his chest, with associated right arm radiation.  Found with a saccular aneurysm vs penetrating ulcer in his descending aorta, seen by CT Surgery at the ER  Trop I mildly elevated BNP elevated CT evidence of pulmonary edema, no PE  Presently free of CP only c/o of R leg pain at the time of my evaluation    < from: CT Angio Chest PE Protocol w/ IV Cont (25 @ 08:26) >  Saccular aneurysm versus penetrating ulcer of the proximal descending   thoracic aorta near left subclavian artery takeoff. The thoracic aorta   measures 3.6 cm in diameter in this location. Significant stenosis of the   proximal SMA suspected. Partially imaged 4.2 cm abdominal aortic aneurysm   with peripheral mural thrombus.     < end of copied text >    PAST MEDICAL & SURGICAL HISTORY:  Hypertension      Hypercholesterolemia      AAA (abdominal aortic aneurysm)      Alcoholism /alcohol abuse  Last drink       Aneurysm artery, femoral  left      Coronary artery disease involving native coronary artery of native heart without angina pectoris  PCI at [facility]      AAA (abdominal aortic aneurysm)  Endurant II Abdominal l Stent Medtronic Graft Implanted      History of extremity bypass graft  Right      H/O inguinal hernia  x2      S/P appendectomy      Abdominal adhesions  Around small bowel      H/O cardiac catheterization          No Known Allergies      MEDICATIONS  (STANDING):  aspirin  chewable 81 milliGRAM(s) Oral daily  atorvastatin 80 milliGRAM(s) Oral at bedtime  furosemide   Injectable 40 milliGRAM(s) IV Push daily  lisinopril 10 milliGRAM(s) Oral daily    MEDICATIONS  (PRN):  acetaminophen     Tablet .. 650 milliGRAM(s) Oral every 6 hours PRN Temp greater or equal to 38C (100.4F), Mild Pain (1 - 3)  aluminum hydroxide/magnesium hydroxide/simethicone Suspension 30 milliLiter(s) Oral every 4 hours PRN Dyspepsia  melatonin 3 milliGRAM(s) Oral at bedtime PRN Insomnia  ondansetron Injectable 4 milliGRAM(s) IV Push every 8 hours PRN Nausea and/or Vomiting      Social History:  Cigarettes:                    Alchohol:                 Illicit Drug Abuse:      REVIEW OF SYSTEMS:    Constitutional: No fever, weight loss or fatigue  Eyes: No eye pain, visual disturbances, or discharge  ENMT:  No difficulty hearing, tinnitus, vertigo; No sinus or throat pain  Neck: No pain or stiffness  Respiratory: No cough, wheezing, chills or hemoptysis  Cardiovascular: No chest pain, palpitations, shortness of breath, dizziness or leg swelling  Gastrointestinal: No abdominal or epigastric pain. No nausea, vomiting or hematemesis; No diarrhea or constipation. No melena or hematochezia.  Genitourinary: No dysuria, frequency, hematuria or incontinence  Rectal: No pain, hemorrhoids or incontinence  Neurological: No headaches, memory loss, loss of strength, numbness or tremors  Skin: No itching, burning, rashes or lesions   Lymph Nodes: No enlarged glands  Endocrine: No heat or cold intolerance; No hair loss  Musculoskeletal: No joint pain or swelling; No muscle, back or extremity pain  Psychiatric: No depression, anxiety, mood swings or difficulty sleeping  Heme/Lymph: No easy bruising or bleeding gums  Allergy and Immunologic: No hives or eczema    Vital Signs Last 24 Hrs  T(C): 36.6 (24 May 2025 11:15), Max: 36.6 (24 May 2025 04:14)  T(F): 97.9 (24 May 2025 11:15), Max: 97.9 (24 May 2025 04:14)  HR: 98 (24 May 2025 11:15) (94 - 101)  BP: 159/91 (24 May 2025 11:15) (159/91 - 187/96)  BP(mean): --  RR: 20 (24 May 2025 11:15) (18 - 21)  SpO2: 97% (24 May 2025 11:15) (94% - 98%)    Parameters below as of 24 May 2025 04:14  Patient On (Oxygen Delivery Method): room air      ICU Vital Signs Last 24 Hrs  JULIO CESAR MARY ALICEINE  I&O's Detail    I&O's Summary    Drug Dosing Weight  JULIO CESAR JONES      PHYSICAL EXAM:  General: Appears alert and cooperative.  HEENT: Head; normocephalic, atraumatic.  Eyes: Pupils reactive, cornea wnl.  Neck: Supple, no nodes adenopathy, no NVD or carotid bruit or thyromegaly.  CARDIOVASCULAR: Normal S1 and S2, No murmur, rub, gallop or lift.   LUNGS: No rales, rhonchi or wheeze. Normal breath sounds bilaterally.  ABDOMEN: Soft, nontender without mass or organomegaly. bowel sounds normoactive.  EXTREMITIES: No clubbing, cyanosis or edema. Distal pulses wnl.   SKIN: warm and dry with normal turgor.  NEURO: Alert/oriented x 3/normal motor exam. No pathologic reflexes.    PSYCH: normal affect.      >>> <<<  LABS:                        8.8    6.42  )-----------( 238      ( 24 May 2025 05:50 )             29.3     05-24    138  |  104  |  19.0  ----------------------------<  104[H]  3.9   |  21.0[L]  |  1.03    Ca    8.6      24 May 2025 05:50    TPro  6.6  /  Alb  4.0  /  TBili  <0.2[L]  /  DBili  x   /  AST  19  /  ALT  12  /  AlkPhos  123[H]  -    JULIO CESAR LAPINE        Urinalysis Basic - ( 24 May 2025 07:10 )    Color: Yellow / Appearance: Clear / S.015 / pH: x  Gluc: x / Ketone: x  / Bili: Negative / Urobili: 1.0 mg/dL   Blood: x / Protein: Negative mg/dL / Nitrite: Negative   Leuk Esterase: Negative / RBC: 0 /HPF / WBC 0 /HPF   Sq Epi: x / Non Sq Epi: 0 /HPF / Bacteria: Negative /HPF        RADIOLOGY & ADDITIONAL STUDIES:    INTERPRETATION OF TELEMETRY (personally reviewed):    ECG: NSR LAE LAD STT changes in lateral leads        ACTIVE PROBLEMS:  HEALTH ISSUES - PROBLEM Dx:  Abnormal CT of the chest            Assessment and Plan:    In summary,     JULIO CESAR JONES is an 64y Male active smoker with Hx of CAD S/P PCI BMS Prox LAD 2016 RESIDUAL 80% prox RCA 60% Ramus PVD S/P Endovascular AAA repair , S/P L femoral aneurysm repair s/p Bypass of L common femoral artery, moderate carotid disease followed by Dr West COPD Hx of ETOH abuse HCL HTN   Hx of prior Endocarditis AV treated with IV ATB therapy recently evaluated at [facility] for initial cardiology eval by Dr Luis Magaña  Presented to [facility] c/o CP that starting around 2:30 in the morning he had trouble going to sleep due to his leg cramps but began to have chest discomfort in the middle of his chest, with associated right arm radiation.  Found with a saccular aneurysm vs penetrating ulcer in his descending aorta, seen by CT Surgery at the ER  Trop I mildly elevated BNP elevated CT evidence of pulmonary edema, no PE  Presently free of CP only c/o of R leg pain at the time of my evaluation      Admit to tele for close monitoring  Serial cardiac markers and EKgs  2DEchocardiogram STAT  CT Surgery evaluation noted source of CP maybe related to CAD or penetrating ulcer in his proximal descending aorta presently free of CP  Will need further imaging to further clarify his CTA findings  Will likely cath him during this hospitalization when his proximal descending aorta evaluation is completed pending clinical course  BP control with labetalol, Antiplatelet and lipid lowering therapy, avoid heparin pending aortic evaluation          DEVAUGHN ZALDIVAR MD, FACC, KAMILLE

## 2025-05-24 NOTE — H&P ADULT - NSHPLABSRESULTS_GEN_ALL_CORE
8.8    6.42  )-----------( 238      ( 24 May 2025 05:50 )             29.3         05-24    138  |  104  |  19.0  ----------------------------<  104[H]  3.9   |  21.0[L]  |  1.03    Ca    8.6      24 May 2025 05:50    TPro  6.6  /  Alb  4.0  /  TBili  <0.2[L]  /  DBili  x   /  AST  19  /  ALT  12  /  AlkPhos  123[H]  05-24        < from: CT Angio Chest PE Protocol w/ IV Cont (05.24.25 @ 08:26) >    IMPRESSION:    No pulmonary embolus up to segmental pulmonary arterial tree.   Subsegmental pulmonary arterial tree is poorly assessed.    Pulmonary edema and trace bilateral pleural effusions. Dependent lung   parenchymal opacities are poorly evaluated due to respiratory motion.   Infection is not excluded. Follow-up chest CT recommended in 6 weeks for   reevaluation. Mild emphysema.    Cardiac enlargement with multivessel coronary artery calcifications and   small amount of aortic valve calcification. Correlate for cardiac   dysfunction.    Saccular aneurysm versus penetrating ulcer of the proximal descending   thoracic aorta near left subclavian artery takeoff. The thoracic aorta   measures 3.6 cm in diameter in this location. Significant stenosis of the   proximal SMA suspected. Partially imaged 4.2 cm abdominal aortic aneurysm   with peripheral mural thrombus. Correlate with CTA as clinically   warranted.    --- End of Report ---    < end of copied text >    < from: Xray Chest 2 Views PA/Lat (05.24.25 @ 08:16) >    IMPRESSION:  Mild interstitial edema      < from: TTE Echo Complete w/Doppler (03.25.16 @ 08:10) >    IMPRESSION: Summary:   1. Left ventricular ejection fraction, by visual estimation, is 45 to   50%.   2. Mildly decreased global left ventricular systolic function.   3. Entire inferior septum and basal and mid anterolateral wall are   abnormal as described above.   4. Mildly increased LV wall thickness.  5. Spectral Doppler shows pseudonormal pattern of left ventricular   myocardial filling (Grade II diastolic dysfunction).   6. There is mild concentric left ventricular hypertrophy.   7. Normal right ventricular size and function.   8. There is no evidence of pericardial effusion.   9. Sclerotic aortic valve with normal opening.  10. The main pulmonary artery is normal in size.    < end of copied text >

## 2025-05-24 NOTE — CONSULT NOTE ADULT - PROBLEM SELECTOR RECOMMENDATION 9
CTA chest - Saccular aneurysm versus penetrating ulcer of the proximal descending thoracic aorta near left subclavian artery takeoff. The thoracic aorta measures 3.6 cm in diameter in this location. Significant stenosis of the proximal SMA suspected. Partially imaged 4.2 cm abdominal aortic aneurysm with peripheral mural thrombus.    Recommend high dose statin. Pt states he was sent new rx by cardiologist last week that was not picked up. Would ensure pt is on high dose.  Recommend blood pressure control. States he was sent rx for HTN as well.  Recommend TTE  Recommend cardiology consult for possible rule out CAD 2/2 elevated troponins  Repeat CT in 3 months with outpatient follow up with Dr. Barnes in office  No acute surgical intervention at this time.  Rest of care per primary team.    Plan discussed with Dr. Barnes

## 2025-05-24 NOTE — CONSULT NOTE ADULT - SUBJECTIVE AND OBJECTIVE BOX
SUBJECTIVE    HPI 64yMale with h/o HTN, HLD, COPD not on home O2, psoriasis, AAA s/p repair, CAD s/p PCI, PVD s/p RLE bypass, current smoker, who presented to [facility] ED c/o worsening chest pain, RUE pain.  Describes chest pain as anterior, tightness, constant since 1am, worsening, radiating to RUE.  Also with LE pain, edema.  States he has not taken any medications for the past year, states they ran out, and had not seen any of his providers for the past year either.  In ED, had uptrending troponins 105 to 113, BNP 3000, elevated dimer.  Cardiology consulted.  Had CTA chest with Saccular aneurysm versus penetrating ulcer of the proximal descending thoracic aorta near left subclavian artery takeoff. The thoracic aorta measures 3.6 cm in diameter in this location. Significant stenosis of the proximal SMA suspected. Partially imaged 4.2 cm abdominal aortic aneurysm with peripheral mural thrombus. CT Surgery consulted for further evaluation.    Pt seen and assessed at bedside.  Pt laying comfortably in hospital stretcher in NAD on 6L NC.  States no current physical complaints at this time.  Denies f/c, n/v, chest pain, sob, abdominal pain, d/c, urinary symptoms.  ROS negative x 10 except as indicated in HPI.      LAST BLOOD THINNER--> aspirin  chewable   324 milliGRAM(s) Oral (05-24-25 @ 07:09)        SOCIAL HISTORY   patient lives with brother; stairs; assist devices - independent ADLs  smoking history - 2ppd x at least 30 years, current  drinking history - denies, h/o ETOH abuse per chart        PAST MEDICAL & SURGICAL HISTORY:  Hypertension    Hypercholesterolemia    AAA (abdominal aortic aneurysm)    Alcoholism /alcohol abuse  Last drink 2010    Aneurysm artery, femoral  left    Coronary artery disease involving native coronary artery of native heart without angina pectoris  PCI at [facility]    AAA (abdominal aortic aneurysm)  Endurant II Abdominal l Stent Medtronic Graft Implanted    History of extremity bypass graft  Right    H/O inguinal hernia  x2    S/P appendectomy    Abdominal adhesions  Around small bowel    H/O cardiac catheterization        Home Medications:  amLODIPine 10 mg oral tablet: 1 tab(s) orally once a day (05 Feb 2017 05:19)  aspirin 81 mg oral tablet: 1 tab(s) orally once a day (05 Feb 2017 05:15)  atorvastatin 20 mg oral tablet: 1 tab(s) orally once a day (at bedtime) (05 Feb 2017 05:15)  Bystolic 10 mg oral tablet: 1 tab(s) orally once a day (05 Feb 2017 05:19)  cefadroxil 500 mg oral capsule:  orally once a day (05 Feb 2017 05:19)  lisinopril 20 mg oral tablet: 1 tab(s) orally once a day (05 Feb 2017 05:19)        Allergies    No Known Allergies    Intolerances        OBJECTIVE DATA    VITALS   currently in sinus rhythm  ICU Vital Signs Last 24 Hrs  T(C): 36.6 (24 May 2025 11:15), Max: 36.6 (24 May 2025 04:14)  T(F): 97.9 (24 May 2025 11:15), Max: 97.9 (24 May 2025 04:14)  HR: 98 (24 May 2025 11:15) (94 - 101)  BP: 159/91 (24 May 2025 11:15) (159/91 - 187/96)  BP(mean): --  ABP: --  ABP(mean): --  RR: 20 (24 May 2025 11:15) (18 - 21)  SpO2: 97% (24 May 2025 11:15) (94% - 98%)    O2 Parameters below as of 24 May 2025 04:14  Patient On (Oxygen Delivery Method): room air      LABS                8.8    6.42  )-----------( 238      ( 24 May 2025 05:50 )             29.3   05-24    138  |  104  |  19.0  ----------------------------<  104[H]  3.9   |  21.0[L]  |  1.03    Ca    8.6      24 May 2025 05:50    TPro  6.6  /  Alb  4.0  /  TBili  <0.2[L]  /  DBili  x   /  AST  19  /  ALT  12  /  AlkPhos  123[H]  05-24        Physical Exam  Neuro: A+O x 3, non-focal, speech clear and intact  HEENT: PERRL, EOMI, oral mucosa pink and moist  Neck: supple, no JVD  CV: regular rate, regular rhythm, +S1S2, no murmurs or rub  Pulm/chest: lung sounds CTA and equal bilaterally, no accessory muscle use noted, on 6L NC  Abd: soft, NT, ND, +BS  Ext: BURTON x 4, no C/C/E  Skin: warm, well perfused, no rashes        IMAGING   personally reviewed imaging       CTA Chest 5/24/25    < from: CT Angio Chest PE Protocol w/ IV Cont (05.24.25 @ 08:26) >  FINDINGS:    LUNGS AND LARGE AIRWAYS: Trace central airways secretions. Mild   emphysema. Bilateral bronchial thickening. Bilateral interlobular septal   thickening. Dependent opacities of the lung parenchyma poorly evaluated   due to respiratory motion.  PLEURA: Trace bilateral pleural effusions.  VESSELS: No pulmonary embolus up to segmental pulmonary arterial tree.   Subsegmental pulmonary arterial tree is poorly assessed. Main pulmonary   artery is enlarged measuring 3.6 cm. This may reflect pulmonary arterial   hypertension. Saccular aneurysm versus penetrating ulcer of the proximal   descending thoracic aorta near left subclavian artery takeoff. The   thoracic aorta measures 3.6 cm in diameter in this location. Extensive   plaque of the thoracic aorta and imaged great vessels, suboptimally   assessed due to phase of contrast.  HEART: Enlarged heart, particularly the left ventricle. Multivessel   coronary artery calcifications. Small amount of aortic valve   calcification. Trace pericardialfluid.  MEDIASTINUM AND VINNY: No enlarged lymph nodes of the thorax. Esophagus is   nondistended.  CHEST WALL AND LOWER NECK: No chest wall hematoma. Thyroid is poorly   assessed due to streak artifact from adjacent contrast bolus.  VISUALIZED UPPER ABDOMEN: Subcentimeter left hepatic hypodensity is too   small to characterize. Few periportal nodes with calcifications are again   noted. Significant stenosis of the proximal SMA suspected. Partially   imaged 4.2 cm abdominal aortic aneurysm with peripheral mural thrombus.  BONES: Degenerative changes of the bones including multilevel spinal   spondylosis. Osseous demineralization.    IMPRESSION:    No pulmonary embolus up to segmental pulmonary arterial tree.   Subsegmental pulmonary arterial tree is poorly assessed.    Pulmonary edema and trace bilateral pleural effusions. Dependent lung   parenchymal opacities are poorly evaluated due to respiratory motion.   Infection is not excluded. Follow-up chest CT recommended in 6 weeks for   reevaluation. Mild emphysema.    Cardiac enlargement with multivessel coronary artery calcifications and   small amount of aortic valve calcification. Correlate for cardiac   dysfunction.    Saccular aneurysm versus penetrating ulcer of the proximal descending   thoracic aorta near left subclavian artery takeoff. The thoracic aorta   measures 3.6 cm in diameter in this location. Significant stenosis of the   proximal SMA suspected. Partially imaged 4.2 cm abdominal aortic aneurysm   with peripheral mural thrombus. Correlate with CTA as clinically   warranted.    --- End of Report ---    < end of copied text >

## 2025-05-24 NOTE — H&P ADULT - NSHPPHYSICALEXAM_GEN_ALL_CORE
GENERAL: pt examined bedside, laying comfortably in bed in NAD  HEENT: NC/AT, moist oral mucosa, clear conjunctiva, sclera nonicteric  RESPIRATORY: Normal respiratory effort; CTA b/l, no wheezing, rhonchi, rales  CARDIOVASCULAR: RRR, normal S1 and S2, no murmur/rub/gallop  ABDOMEN: soft, NT/ND, normoactive bowel sounds, no rebound/guarding  MSK: No joint deformities, edema, erythema  EXTREMITIES: No cynaosis, no clubbing, no lower extremity edema  PSYCH: affect appropriate and cooperative  NEUROLOGY: A+O to person, place, and time, no focal neurologic deficits appreciated  SKIN: No rashes or no palpable lesions GENERAL: pt examined bedside, laying comfortably in bed in NAD  HEENT: NC/AT, moist oral mucosa, clear conjunctiva, sclera nonicteric  RESPIRATORY: Normal respiratory effort, distant BS b/l, bibasilar rales but no wheezing or rhonchi   CARDIOVASCULAR: RRR, normal S1 and S2, no murmur/rub/gallop  ABDOMEN: soft, NT/ND, normoactive bowel sounds, no rebound/guarding  MSK: No joint deformities, edema, erythema  EXTREMITIES: No cynaosis, no clubbing, no lower extremity edema  NEUROLOGY: A+O to person, place, and time, no focal neurologic deficits appreciated  SKIN: b/l LE varicose veins appreciated, scattered poriasis plaques on extremities GENERAL: pt examined bedside, laying comfortably in bed in NAD  HEENT: NC/AT, moist oral mucosa, clear conjunctiva, sclera nonicteric  RESPIRATORY: Normal respiratory effort, distant BS b/l, bibasilar rales but no wheezing or rhonchi   CARDIOVASCULAR: RRR, normal S1 and S2, no murmur/rub/gallop  ABDOMEN: soft, NT/ND, normoactive bowel sounds, no rebound/guarding  MSK: No joint deformities, edema, erythema  EXTREMITIES: No cynaosis, no clubbing, no lower extremity edema, +DP/PT pulses  NEUROLOGY: A+O to person, place, and time, no focal neurologic deficits appreciated  SKIN: b/l LE varicose veins appreciated, warm to touch, scattered poriasis plaques on extremities

## 2025-05-24 NOTE — ED PROVIDER NOTE - ATTENDING CONTRIBUTION TO CARE
CP and SOB; on exam, non toxic, no JVD; normal heart sounds; +b/l crackles and faint exp wheezing; abd softn nt nd; labs and imaging c/w NSTEMI and CHF; pending final ct imaging and admission at time of sign out    I personally saw the patient with the resident, and completed the key components of the history and physical exam. I then discussed the management plan with the resident. CP and SOB; on exam, non toxic, no JVD; normal heart sounds; +b/l crackles and faint exp wheezing; abd softn nt nd; +doppler signal biphasic b/l lower ext; labs and imaging c/w NSTEMI and CHF; pending final ct imaging and admission at time of sign out    I personally saw the patient with the resident, and completed the key components of the history and physical exam. I then discussed the management plan with the resident.

## 2025-05-24 NOTE — CONSULT NOTE ADULT - ASSESSMENT
64yMale with h/o HTN, HLD, COPD not on home O2, psoriasis, AAA s/p repair, CAD s/p PCI, PVD s/p RLE bypass, current smoker, who presented to [facility] ED c/o worsening chest pain, RUE pain.  Describes chest pain as anterior, tightness, constant since 1am, worsening, radiating to RUE.  Also with LE pain, edema.  States he has not taken any medications for the past year, states they ran out, and had not seen any of his providers for the past year either.  In ED, had uptrending troponins 105 to 113, BNP 3000, elevated dimer.  Cardiology consulted.  Had CTA chest with Saccular aneurysm versus penetrating ulcer of the proximal descending thoracic aorta near left subclavian artery takeoff. The thoracic aorta measures 3.6 cm in diameter in this location. Significant stenosis of the proximal SMA suspected. Partially imaged 4.2 cm abdominal aortic aneurysm with peripheral mural thrombus. CT Surgery consulted for further evaluation.

## 2025-05-24 NOTE — ED ADULT NURSE NOTE - OBJECTIVE STATEMENT
patient complaining of chest pain since last night and right foot pain, pt denies injury, trauma, sob, nausea, vomiting or palpitations. Patient had outpatient abd CT and pending results. pt with hx of cardiac stents and Bypass.

## 2025-05-24 NOTE — ED PROVIDER NOTE - PROGRESS NOTE DETAILS
MD Gildardo: Initially elevated troponins felt to be due to to new onset CHF, type II NSTEMI.  However repeat EKG with significant changes, 1 mm ST concave elevation in aVR ST depression T wave inversions in leads I to aVL aVF V5.  Spoke to Dr. Supriya Bird and RN, will get accurate weight and initiate heparin. Kavya Clark MD Attending Physician- MD Gildardo: CTA showed saccular aneurysm versus penetrating ulcer of the proximal descending thoracic aorta near left subclavian artery takeoff. CTS consulted for evaluation.

## 2025-05-24 NOTE — H&P ADULT - NSICDXPASTSURGICALHX_GEN_ALL_CORE_FT
PAST SURGICAL HISTORY:  AAA (abdominal aortic aneurysm) Endurant II Abdominal l Stent Medtronic Graft Implanted    Abdominal adhesions Around small bowel    H/O cardiac catheterization     H/O inguinal hernia x2    History of extremity bypass graft Right    S/P appendectomy

## 2025-05-24 NOTE — ED ADULT NURSE REASSESSMENT NOTE - NS ED NURSE REASSESS COMMENT FT1
Assumed care of patient at approx 07:30am. Patient AxOx4. Patient denies pain/discomfort at this time. Patient has been updated on the plan of care. Patient offers no complaints at this time. No visible signs of acute distress noted, safety maintained.

## 2025-05-24 NOTE — ED ADULT NURSE REASSESSMENT NOTE - NS ED NURSE REASSESS COMMENT FT1
Assuming care from RN, plan of care, reason for hospital visit were reviewed, introduced to patient, updated patient on plan of care. Education deemed successful after teach back shows proficiency, VS recorded in the EMR. Pt is on herpin drip and is tolerating it well. Pt denies any complaints. Attempted to give report to [facility]. NAD.

## 2025-05-24 NOTE — ED PROVIDER NOTE - PHYSICAL EXAMINATION
Gen: +appears to be uncomfortable +smoke odor  Head: normocephalic, atraumatic  EENT: EOMI, moist mucous membranes, no scleral icterus  Lung: no increased work of breathing, +mild wheezing, +decreased lung sounds  CV: regular rate, regular rhythm, normal s1/s2, 2+ radial pulses bilaterally  Abd: soft, non-tender, non-distended,    MSK: No edema, no visible deformities, full range of motion in all 4 extremities  Neuro: Awake, alert, no focal neurologic deficits  Skin: No obvious rash, no jaundice  Psych: normal affect, normal speech

## 2025-05-25 LAB
A1C WITH ESTIMATED AVERAGE GLUCOSE RESULT: 5.9 % — HIGH (ref 4–5.6)
ALBUMIN SERPL ELPH-MCNC: 4 G/DL — SIGNIFICANT CHANGE UP (ref 3.3–5.2)
ALP SERPL-CCNC: 103 U/L — SIGNIFICANT CHANGE UP (ref 40–120)
ALT FLD-CCNC: 13 U/L — SIGNIFICANT CHANGE UP
ANION GAP SERPL CALC-SCNC: 15 MMOL/L — SIGNIFICANT CHANGE UP (ref 5–17)
APTT BLD: 39.1 SEC — HIGH (ref 26.1–36.8)
APTT BLD: 55.3 SEC — HIGH (ref 26.1–36.8)
APTT BLD: 58.3 SEC — HIGH (ref 26.1–36.8)
APTT BLD: 76.3 SEC — HIGH (ref 26.1–36.8)
AST SERPL-CCNC: 29 U/L — SIGNIFICANT CHANGE UP
BASOPHILS # BLD AUTO: 0.07 K/UL — SIGNIFICANT CHANGE UP (ref 0–0.2)
BASOPHILS NFR BLD AUTO: 0.7 % — SIGNIFICANT CHANGE UP (ref 0–2)
BILIRUB SERPL-MCNC: 0.4 MG/DL — SIGNIFICANT CHANGE UP (ref 0.4–2)
BUN SERPL-MCNC: 19.4 MG/DL — SIGNIFICANT CHANGE UP (ref 8–20)
CALCIUM SERPL-MCNC: 9.1 MG/DL — SIGNIFICANT CHANGE UP (ref 8.4–10.5)
CHLORIDE SERPL-SCNC: 101 MMOL/L — SIGNIFICANT CHANGE UP (ref 96–108)
CHOLEST SERPL-MCNC: 204 MG/DL — HIGH
CO2 SERPL-SCNC: 21 MMOL/L — LOW (ref 22–29)
CREAT SERPL-MCNC: 1.09 MG/DL — SIGNIFICANT CHANGE UP (ref 0.5–1.3)
CULTURE RESULTS: SIGNIFICANT CHANGE UP
EGFR: 76 ML/MIN/1.73M2 — SIGNIFICANT CHANGE UP
EGFR: 76 ML/MIN/1.73M2 — SIGNIFICANT CHANGE UP
EOSINOPHIL # BLD AUTO: 0.23 K/UL — SIGNIFICANT CHANGE UP (ref 0–0.5)
EOSINOPHIL NFR BLD AUTO: 2.4 % — SIGNIFICANT CHANGE UP (ref 0–6)
ESTIMATED AVERAGE GLUCOSE: 123 MG/DL — HIGH (ref 68–114)
FERRITIN SERPL-MCNC: 18 NG/ML — LOW (ref 30–400)
FOLATE SERPL-MCNC: 13.7 NG/ML — SIGNIFICANT CHANGE UP
GLUCOSE SERPL-MCNC: 104 MG/DL — HIGH (ref 70–99)
HCT VFR BLD CALC: 29.5 % — LOW (ref 39–50)
HCT VFR BLD CALC: 32.4 % — LOW (ref 39–50)
HDLC SERPL-MCNC: 37 MG/DL — LOW
HGB BLD-MCNC: 8.9 G/DL — LOW (ref 13–17)
HGB BLD-MCNC: 9.6 G/DL — LOW (ref 13–17)
IMM GRANULOCYTES # BLD AUTO: 0.03 K/UL — SIGNIFICANT CHANGE UP (ref 0–0.07)
IMM GRANULOCYTES NFR BLD AUTO: 0.3 % — SIGNIFICANT CHANGE UP (ref 0–0.9)
IRON SATN MFR SERPL: 18 UG/DL — LOW (ref 59–158)
IRON SATN MFR SERPL: 4 % — LOW (ref 16–55)
LDLC SERPL-MCNC: 144 MG/DL — HIGH
LIPID PNL WITH DIRECT LDL SERPL: 144 MG/DL — HIGH
LYMPHOCYTES # BLD AUTO: 2.14 K/UL — SIGNIFICANT CHANGE UP (ref 1–3.3)
LYMPHOCYTES NFR BLD AUTO: 22.4 % — SIGNIFICANT CHANGE UP (ref 13–44)
MAGNESIUM SERPL-MCNC: 2.2 MG/DL — SIGNIFICANT CHANGE UP (ref 1.6–2.6)
MCHC RBC-ENTMCNC: 23.1 PG — LOW (ref 27–34)
MCHC RBC-ENTMCNC: 23.4 PG — LOW (ref 27–34)
MCHC RBC-ENTMCNC: 29.6 G/DL — LOW (ref 32–36)
MCHC RBC-ENTMCNC: 30.2 G/DL — LOW (ref 32–36)
MCV RBC AUTO: 77.4 FL — LOW (ref 80–100)
MCV RBC AUTO: 78.1 FL — LOW (ref 80–100)
MONOCYTES # BLD AUTO: 1.15 K/UL — HIGH (ref 0–0.9)
MONOCYTES NFR BLD AUTO: 12 % — SIGNIFICANT CHANGE UP (ref 2–14)
NEUTROPHILS # BLD AUTO: 5.95 K/UL — SIGNIFICANT CHANGE UP (ref 1.8–7.4)
NEUTROPHILS NFR BLD AUTO: 62.2 % — SIGNIFICANT CHANGE UP (ref 43–77)
NONHDLC SERPL-MCNC: 167 MG/DL — HIGH
NRBC # BLD AUTO: 0 K/UL — SIGNIFICANT CHANGE UP (ref 0–0)
NRBC # BLD AUTO: 0 K/UL — SIGNIFICANT CHANGE UP (ref 0–0)
NRBC # FLD: 0 K/UL — SIGNIFICANT CHANGE UP (ref 0–0)
NRBC # FLD: 0 K/UL — SIGNIFICANT CHANGE UP (ref 0–0)
NRBC BLD AUTO-RTO: 0 /100 WBCS — SIGNIFICANT CHANGE UP (ref 0–0)
NRBC BLD AUTO-RTO: 0 /100 WBCS — SIGNIFICANT CHANGE UP (ref 0–0)
PHOSPHATE SERPL-MCNC: 2.6 MG/DL — SIGNIFICANT CHANGE UP (ref 2.4–4.7)
PLATELET # BLD AUTO: 250 K/UL — SIGNIFICANT CHANGE UP (ref 150–400)
PLATELET # BLD AUTO: 272 K/UL — SIGNIFICANT CHANGE UP (ref 150–400)
PMV BLD: 10.1 FL — SIGNIFICANT CHANGE UP (ref 7–13)
PMV BLD: 10.3 FL — SIGNIFICANT CHANGE UP (ref 7–13)
POTASSIUM SERPL-MCNC: 3.4 MMOL/L — LOW (ref 3.5–5.3)
POTASSIUM SERPL-SCNC: 3.4 MMOL/L — LOW (ref 3.5–5.3)
PROT SERPL-MCNC: 7 G/DL — SIGNIFICANT CHANGE UP (ref 6.6–8.7)
RBC # BLD: 3.81 M/UL — LOW (ref 4.2–5.8)
RBC # BLD: 4.15 M/UL — LOW (ref 4.2–5.8)
RBC # FLD: 17.2 % — HIGH (ref 10.3–14.5)
RBC # FLD: 17.2 % — HIGH (ref 10.3–14.5)
SODIUM SERPL-SCNC: 137 MMOL/L — SIGNIFICANT CHANGE UP (ref 135–145)
SPECIMEN SOURCE: SIGNIFICANT CHANGE UP
TIBC SERPL-MCNC: 492 UG/DL — HIGH (ref 220–430)
TRANSFERRIN SERPL-MCNC: 344 MG/DL — HIGH (ref 180–329)
TRIGL SERPL-MCNC: 128 MG/DL — SIGNIFICANT CHANGE UP
TROPONIN T, HIGH SENSITIVITY RESULT: 362 NG/L — HIGH (ref 0–51)
VIT B12 SERPL-MCNC: 247 PG/ML — SIGNIFICANT CHANGE UP (ref 232–1245)
WBC # BLD: 8.94 K/UL — SIGNIFICANT CHANGE UP (ref 3.8–10.5)
WBC # BLD: 9.57 K/UL — SIGNIFICANT CHANGE UP (ref 3.8–10.5)
WBC # FLD AUTO: 8.94 K/UL — SIGNIFICANT CHANGE UP (ref 3.8–10.5)
WBC # FLD AUTO: 9.57 K/UL — SIGNIFICANT CHANGE UP (ref 3.8–10.5)

## 2025-05-25 PROCEDURE — 36569 INSJ PICC 5 YR+ W/O IMAGING: CPT

## 2025-05-25 PROCEDURE — 93010 ELECTROCARDIOGRAM REPORT: CPT

## 2025-05-25 PROCEDURE — 99233 SBSQ HOSP IP/OBS HIGH 50: CPT

## 2025-05-25 PROCEDURE — 76937 US GUIDE VASCULAR ACCESS: CPT | Mod: 26,59

## 2025-05-25 RX ORDER — IPRATROPIUM BROMIDE AND ALBUTEROL SULFATE .5; 2.5 MG/3ML; MG/3ML
3 SOLUTION RESPIRATORY (INHALATION) EVERY 6 HOURS
Refills: 0 | Status: DISCONTINUED | OUTPATIENT
Start: 2025-05-25 | End: 2025-06-03

## 2025-05-25 RX ORDER — IRON SUCROSE 20 MG/ML
200 INJECTION, SOLUTION INTRAVENOUS ONCE
Refills: 0 | Status: COMPLETED | OUTPATIENT
Start: 2025-05-25 | End: 2025-05-25

## 2025-05-25 RX ADMIN — FUROSEMIDE 40 MILLIGRAM(S): 10 INJECTION INTRAMUSCULAR; INTRAVENOUS at 05:34

## 2025-05-25 RX ADMIN — Medication 81 MILLIGRAM(S): at 12:26

## 2025-05-25 RX ADMIN — METOPROLOL SUCCINATE 50 MILLIGRAM(S): 50 TABLET, EXTENDED RELEASE ORAL at 17:51

## 2025-05-25 RX ADMIN — ATORVASTATIN CALCIUM 80 MILLIGRAM(S): 80 TABLET, FILM COATED ORAL at 21:14

## 2025-05-25 RX ADMIN — HEPARIN SODIUM 1250 UNIT(S)/HR: 1000 INJECTION INTRAVENOUS; SUBCUTANEOUS at 01:10

## 2025-05-25 RX ADMIN — LISINOPRIL 10 MILLIGRAM(S): 5 TABLET ORAL at 05:34

## 2025-05-25 RX ADMIN — HEPARIN SODIUM 1150 UNIT(S)/HR: 1000 INJECTION INTRAVENOUS; SUBCUTANEOUS at 13:58

## 2025-05-25 RX ADMIN — HEPARIN SODIUM 1150 UNIT(S)/HR: 1000 INJECTION INTRAVENOUS; SUBCUTANEOUS at 20:42

## 2025-05-25 RX ADMIN — HEPARIN SODIUM 1150 UNIT(S)/HR: 1000 INJECTION INTRAVENOUS; SUBCUTANEOUS at 19:48

## 2025-05-25 RX ADMIN — Medication 40 MILLIEQUIVALENT(S): at 10:24

## 2025-05-25 RX ADMIN — HEPARIN SODIUM 1150 UNIT(S)/HR: 1000 INJECTION INTRAVENOUS; SUBCUTANEOUS at 16:06

## 2025-05-25 RX ADMIN — METOPROLOL SUCCINATE 50 MILLIGRAM(S): 50 TABLET, EXTENDED RELEASE ORAL at 05:34

## 2025-05-25 RX ADMIN — HEPARIN SODIUM 1150 UNIT(S)/HR: 1000 INJECTION INTRAVENOUS; SUBCUTANEOUS at 07:52

## 2025-05-25 RX ADMIN — HEPARIN SODIUM 5300 UNIT(S): 1000 INJECTION INTRAVENOUS; SUBCUTANEOUS at 01:14

## 2025-05-25 RX ADMIN — IRON SUCROSE 110 MILLIGRAM(S): 20 INJECTION, SOLUTION INTRAVENOUS at 12:26

## 2025-05-25 RX ADMIN — HEPARIN SODIUM 1150 UNIT(S)/HR: 1000 INJECTION INTRAVENOUS; SUBCUTANEOUS at 14:00

## 2025-05-25 RX ADMIN — Medication 1 DOSE(S): at 21:11

## 2025-05-25 NOTE — CHART NOTE - NSCHARTNOTEFT_GEN_A_CORE
64yMale with h/o HTN, HLD, COPD not on home O2, psoriasis, AAA s/p repair, CAD s/p PCI, PVD s/p RLE bypass, current smoker, who presented to [facility] ED c/o worsening chest pain, RUE pain.  Describes chest pain as anterior, tightness, constant since 1am, worsening, radiating to RUE.  Also with LE pain, edema.  States he has not taken any medications for the past year, states they ran out, and had not seen any of his providers for the past year either.  In ED, had uptrending troponins 105 to 113, BNP 3000, elevated dimer.  Cardiology consulted.  Had CTA chest with Saccular aneurysm versus penetrating ulcer of the proximal descending thoracic aorta near left subclavian artery takeoff. The thoracic aorta measures 3.6 cm in diameter in this location. Significant stenosis of the proximal SMA suspected. Partially imaged 4.2 cm abdominal aortic aneurysm with peripheral mural thrombus. CT Surgery consulted for further evaluation.      No further workup of saccular aneurysm vs penetrating ulcer needed at this time.  recommend repeat CT in 3 months.  Recommend further cardiac workup.  Cardiology following. Plan for R/LHC on Tuesday  Will continue to follow  Rest of care per primary team    Plan discussed with Dr. Barnes.

## 2025-05-25 NOTE — PROCEDURE NOTE - ADDITIONAL PROCEDURE DETAILS
Asked by nursing to place IV for 64 y.o Male for IV access. Nursing tried first.  18 g IV placed with utilizing ultrasound guidance. #18 angio inserted left upper arm, good blood return, secured well, easily flushed with 5cc normal saline flushes multiple times. Nursing aware. Patient tolerated procedure well. Sharps disposed of in a safe manner.

## 2025-05-25 NOTE — PROGRESS NOTE ADULT - SUBJECTIVE AND OBJECTIVE BOX
[place] [facility] - [facility], [facility]ENTER                                   [address] [place] [address]                                                      PHONE: [phone]                                                         FAX: [phone]  [email or web address]  ---------------------------------------------------------------------------------------------------------------------------------    Reason for Consult: CP    HPI:  JULIO CESAR JONES is an 64y Male active smoker with Hx of CAD S/P PCI BMS Prox LAD 2016 RESIDUAL 80% prox RCA 60% Ramus PVD S/P Endovascular AAA repair , S/P L femoral aneurysm repair s/p Bypass of L common femoral artery, moderate carotid disease followed by Dr West COPD Hx of ETOH abuse HCL HTN   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  Found with a saccular aneurysm vs penetrating ulcer in his descending aorta, seen by CT Surgery at the ER  Trop I mildly elevated BNP elevated CT evidence of pulmonary edema, no PE  Presently free of CP only c/o of R leg pain at the time of my evaluation    < from: CT Angio Chest PE Protocol w/ IV Cont (25 @ 08:26) >  Saccular aneurysm versus penetrating ulcer of the proximal descending   thoracic aorta near left subclavian artery takeoff. The thoracic aorta   measures 3.6 cm in diameter in this location. Significant stenosis of the   proximal SMA suspected. Partially imaged 4.2 cm abdominal aortic aneurysm   with peripheral mural thrombus.     < end of copied text >      RECENT EVENTS    TTE noticed    < from: TTE W or WO Ultrasound Enhancing Agent (25 @ 15:23) >  1. Left ventricular cavity is normal in size. Left ventricular wall thickness is normal. Left ventricular systolic function is mildly decreased with an ejection fraction visually estimated at 40 to 45 %. Global left ventricular hypokinesis.   2. There is mild (grade 1) left ventricular diastolic dysfunction, with normal left ventricular filling pressure.   3. Normal right ventricular cavity size, with normal wall thickness, and normal right ventricular systolic function.   4. Moderate to severe mitral regurgitation.   5. No pericardial effusion seen.   6. No prior echocardiogram is available for comparison.   7. Pulmonary artery systolic pressure could not be estimated.   8. Recommendations: Consider [facility] for evaluation of Mitral regurgitation.    ________________________________________________________________________________________  FINDINGS:     Left Ventricle:  The left ventricular cavity is normal in size. Left ventricular wall thickness is normal. Left ventricular systolic function is mildly decreased with an ejection fraction visually estimated at 40 to 45%. There is global left ventricular hypokinesis. There is mild (grade 1) left ventricular diastolic dysfunction, with normal left ventricular filling pressure.     Right Ventricle:  The right ventricular cavity is normal in size, with normal wall thickness and right ventricular systolic function is normal. Tricuspid annular plane systolic excursion (TAPSE) is 2.2 cm (normal >=1.7 cm).     Left Atrium:  The left atrium is severely dilated with an indexed volume of 49.48 ml/m².     Right Atrium:  The right atrium is normal in size.     Interatrial Septum:  Lipomatous interatrial septal hypertrophy present.     Aortic Valve:  The aortic valve appears trileaflet with normal systolic excursion. There is fibrocalcific aortic valve sclerosis without stenosis. There is no evidence of aortic regurgitation.     Mitral Valve:  There is mild calcification of the mitral valve annulus. There is mild leaflet calcification. There is moderate to severe mitral regurgitation.     Tricuspid Valve:  The tricuspid valve was not well visualized. There is trace tricuspid regurgitation. There is insufficient tricuspid regurgitation detected to calculate pulmonary artery systolic pressure.     Pulmonic Valve:  The pulmonic valve was not well visualized. There is trace pulmonic regurgitation.     Pulmonary Artery:  The main pulmonary artery is normal in size, origin, and position.     Aorta:  The aortic root appears normal in size. The aortic root at the sinuses of Valsalva is normal in size.     Pericardium:  No pericardial effusion seen.     Systemic Veins:  The inferior vena cava is normal in size (normal <2.1cm) with normal inspiratory collapse (normal >50%) consistent with normal right atrial pressure (~3, range 0-5mmHg).  ____________________________________________________________________    < end of copied text >      PAST MEDICAL & SURGICAL HISTORY:  Hypertension      Hypercholesterolemia      AAA (abdominal aortic aneurysm)      Alcoholism /alcohol abuse  Last drink       Aneurysm artery, femoral  left      Coronary artery disease involving native coronary artery of native heart without angina pectoris  PCI at [facility]      AAA (abdominal aortic aneurysm)  Endurant II Abdominal l Stent Medtronic Graft Implanted      History of extremity bypass graft  Right      H/O inguinal hernia  x2      S/P appendectomy      Abdominal adhesions  Around small bowel      H/O cardiac catheterization          No Known Allergies      MEDICATIONS  (STANDING):  aspirin  chewable 81 milliGRAM(s) Oral daily  atorvastatin 80 milliGRAM(s) Oral at bedtime  furosemide   Injectable 40 milliGRAM(s) IV Push daily  lisinopril 10 milliGRAM(s) Oral daily    MEDICATIONS  (PRN):  acetaminophen     Tablet .. 650 milliGRAM(s) Oral every 6 hours PRN Temp greater or equal to 38C (100.4F), Mild Pain (1 - 3)  aluminum hydroxide/magnesium hydroxide/simethicone Suspension 30 milliLiter(s) Oral every 4 hours PRN Dyspepsia  melatonin 3 milliGRAM(s) Oral at bedtime PRN Insomnia  ondansetron Injectable 4 milliGRAM(s) IV Push every 8 hours PRN Nausea and/or Vomiting      Social History:  Cigarettes:                    Alchohol:                 Illicit Drug Abuse:      REVIEW OF SYSTEMS:    Constitutional: No fever, weight loss or fatigue  Eyes: No eye pain, visual disturbances, or discharge  ENMT:  No difficulty hearing, tinnitus, vertigo; No sinus or throat pain  Neck: No pain or stiffness  Respiratory: No cough, wheezing, chills or hemoptysis  Cardiovascular: No chest pain, palpitations, shortness of breath, dizziness or leg swelling  Gastrointestinal: No abdominal or epigastric pain. No nausea, vomiting or hematemesis; No diarrhea or constipation. No melena or hematochezia.  Genitourinary: No dysuria, frequency, hematuria or incontinence  Rectal: No pain, hemorrhoids or incontinence  Neurological: No headaches, memory loss, loss of strength, numbness or tremors  Skin: No itching, burning, rashes or lesions   Lymph Nodes: No enlarged glands  Endocrine: No heat or cold intolerance; No hair loss  Musculoskeletal: No joint pain or swelling; No muscle, back or extremity pain  Psychiatric: No depression, anxiety, mood swings or difficulty sleeping  Heme/Lymph: No easy bruising or bleeding gums  Allergy and Immunologic: No hives or eczema    ICU Vital Signs Last 24 Hrs  T(C): 36.7 (25 May 2025 07:49), Max: 36.8 (24 May 2025 21:01)  T(F): 98 (25 May 2025 07:49), Max: 98.3 (25 May 2025 00:20)  HR: 64 (25 May 2025 07:49) (64 - 98)  BP: 114/64 (25 May 2025 07:49) (114/64 - 160/87)  BP(mean): --  ABP: --  ABP(mean): --  RR: 18 (25 May 2025 07:49) (17 - 20)  SpO2: 98% (25 May 2025 07:49) (95% - 98%)    O2 Parameters below as of 25 May 2025 07:49  Patient On (Oxygen Delivery Method): room air        Parameters below as of 24 May 2025 04:14  Patient On (Oxygen Delivery Method): room air      ICU Vital Signs Last 24 Hrs  JULIO CESAR KAREN  I&O's Detail    I&O's Summary    Drug Dosing Weight  JULIO CESAR JONES      PHYSICAL EXAM:  General: Appears alert and cooperative.  HEENT: Head; normocephalic, atraumatic.  Eyes: Pupils reactive, cornea wnl.  Neck: Supple, no nodes adenopathy, no NVD or carotid bruit or thyromegaly.  CARDIOVASCULAR: Normal S1 and S2, No murmur, rub, gallop or lift.   LUNGS: No rales, rhonchi or wheeze. Normal breath sounds bilaterally.  ABDOMEN: Soft, nontender without mass or organomegaly. bowel sounds normoactive.  EXTREMITIES: No clubbing, cyanosis or edema. Distal pulses wnl.   SKIN: warm and dry with normal turgor.  NEURO: Alert/oriented x 3/normal motor exam. No pathologic reflexes.    PSYCH: normal affect.      >>> <<<  LABS:                        8.8    6.42  )-----------( 238      ( 24 May 2025 05:50 )             29.3         138  |  104  |  19.0  ----------------------------<  104[H]  3.9   |  21.0[L]  |  1.03    Ca    8.6      24 May 2025 05:50    TPro  6.6  /  Alb  4.0  /  TBili  <0.2[L]  /  DBili  x   /  AST  19  /  ALT  12  /  AlkPhos  123[H]      JULIO CESAR LAPINE        Urinalysis Basic - ( 24 May 2025 07:10 )    Color: Yellow / Appearance: Clear / S.015 / pH: x  Gluc: x / Ketone: x  / Bili: Negative / Urobili: 1.0 mg/dL   Blood: x / Protein: Negative mg/dL / Nitrite: Negative   Leuk Esterase: Negative / RBC: 0 /HPF / WBC 0 /HPF   Sq Epi: x / Non Sq Epi: 0 /HPF / Bacteria: Negative /HPF        RADIOLOGY & ADDITIONAL STUDIES:    INTERPRETATION OF TELEMETRY (personally reviewed):    ECG: NSR LAE LAD STT changes in lateral leads        ACTIVE PROBLEMS:  HEALTH ISSUES - PROBLEM Dx:  Abnormal CT of the chest            Assessment and Plan:    In summary,     JULIO CESAR JONES is an 64y Male active smoker with Hx of CAD S/P PCI BMS Prox LAD 2016 RESIDUAL 80% prox RCA 60% Ramus PVD S/P Endovascular AAA repair , S/P L femoral aneurysm repair s/p Bypass of L common femoral artery, moderate carotid disease followed by Dr West COPD Hx of ETOH abuse HCL HTN   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  Found with a saccular aneurysm vs penetrating ulcer in his descending aorta, seen by CT Surgery at the ER  Trop I mildly elevated BNP elevated CT evidence of pulmonary edema, no PE  Presently free of CP only c/o of R leg pain at the time of my evaluation      tele for close monitoring  Will proceed with R and L cardiac cath tuesday CAD and MR wiley   Cont present cardiac therapy        DEVAUGHN ZALDIVAR MD, FACC, KAMILLE                 [place] [facility] - [facility], [facility]                                   [address] [place] [address]                                                      PHONE: [phone]                                                         FAX: [phone]  [email or web address]  ---------------------------------------------------------------------------------------------------------------------------------    Reason for Consult: CP    HPI:  JULIO CESAR JONES is an 64y Male active smoker with Hx of CAD S/P PCI BMS Prox LAD 2016 RESIDUAL 80% prox RCA 60% Ramus PVD S/P Endovascular AAA repair , S/P L femoral aneurysm repair s/p Bypass of L common femoral artery, moderate carotid disease followed by Dr West COPD Hx of ETOH abuse HCL HTN   Hx of prior Endocarditis AV treated with IV ATB therapy recently evaluated at [facility] for initial cardiology eval by Dr Luis Magaña  Presented to [facility] c/o CP that starting around 2:30 in the morning he had trouble going to sleep due to his leg cramps but began to have chest discomfort in the middle of his chest, with associated right arm radiation.  Found with a saccular aneurysm vs penetrating ulcer in his descending aorta, seen by CT Surgery at the ER  Trop I mildly elevated BNP elevated CT evidence of pulmonary edema, no PE  Presently free of CP only c/o of R leg pain at the time of my evaluation    < from: CT Angio Chest PE Protocol w/ IV Cont (25 @ 08:26) >  Saccular aneurysm versus penetrating ulcer of the proximal descending   thoracic aorta near left subclavian artery takeoff. The thoracic aorta   measures 3.6 cm in diameter in this location. Significant stenosis of the   proximal SMA suspected. Partially imaged 4.2 cm abdominal aortic aneurysm   with peripheral mural thrombus.     < end of copied text >      RECENT EVENTS    TTE noticed    < from: TTE W or WO Ultrasound Enhancing Agent (25 @ 15:23) >  1. Left ventricular cavity is normal in size. Left ventricular wall thickness is normal. Left ventricular systolic function is mildly decreased with an ejection fraction visually estimated at 40 to 45 %. Global left ventricular hypokinesis.   2. There is mild (grade 1) left ventricular diastolic dysfunction, with normal left ventricular filling pressure.   3. Normal right ventricular cavity size, with normal wall thickness, and normal right ventricular systolic function.   4. Moderate to severe mitral regurgitation.   5. No pericardial effusion seen.   6. No prior echocardiogram is available for comparison.   7. Pulmonary artery systolic pressure could not be estimated.   8. Recommendations: Consider [facility] for evaluation of Mitral regurgitation.    ________________________________________________________________________________________  FINDINGS:     Left Ventricle:  The left ventricular cavity is normal in size. Left ventricular wall thickness is normal. Left ventricular systolic function is mildly decreased with an ejection fraction visually estimated at 40 to 45%. There is global left ventricular hypokinesis. There is mild (grade 1) left ventricular diastolic dysfunction, with normal left ventricular filling pressure.     Right Ventricle:  The right ventricular cavity is normal in size, with normal wall thickness and right ventricular systolic function is normal. Tricuspid annular plane systolic excursion (TAPSE) is 2.2 cm (normal >=1.7 cm).     Left Atrium:  The left atrium is severely dilated with an indexed volume of 49.48 ml/m².     Right Atrium:  The right atrium is normal in size.     Interatrial Septum:  Lipomatous interatrial septal hypertrophy present.     Aortic Valve:  The aortic valve appears trileaflet with normal systolic excursion. There is fibrocalcific aortic valve sclerosis without stenosis. There is no evidence of aortic regurgitation.     Mitral Valve:  There is mild calcification of the mitral valve annulus. There is mild leaflet calcification. There is moderate to severe mitral regurgitation.     Tricuspid Valve:  The tricuspid valve was not well visualized. There is trace tricuspid regurgitation. There is insufficient tricuspid regurgitation detected to calculate pulmonary artery systolic pressure.     Pulmonic Valve:  The pulmonic valve was not well visualized. There is trace pulmonic regurgitation.     Pulmonary Artery:  The main pulmonary artery is normal in size, origin, and position.     Aorta:  The aortic root appears normal in size. The aortic root at the sinuses of Valsalva is normal in size.     Pericardium:  No pericardial effusion seen.     Systemic Veins:  The inferior vena cava is normal in size (normal <2.1cm) with normal inspiratory collapse (normal >50%) consistent with normal right atrial pressure (~3, range 0-5mmHg).  ____________________________________________________________________    < end of copied text >      PAST MEDICAL & SURGICAL HISTORY:  Hypertension      Hypercholesterolemia      AAA (abdominal aortic aneurysm)      Alcoholism /alcohol abuse  Last drink       Aneurysm artery, femoral  left      Coronary artery disease involving native coronary artery of native heart without angina pectoris  PCI at [facility]      AAA (abdominal aortic aneurysm)  Endurant II Abdominal l Stent Medtronic Graft Implanted      History of extremity bypass graft  Right      H/O inguinal hernia  x2      S/P appendectomy      Abdominal adhesions  Around small bowel      H/O cardiac catheterization          No Known Allergies      MEDICATIONS  (STANDING):  aspirin  chewable 81 milliGRAM(s) Oral daily  atorvastatin 80 milliGRAM(s) Oral at bedtime  furosemide   Injectable 40 milliGRAM(s) IV Push daily  lisinopril 10 milliGRAM(s) Oral daily    MEDICATIONS  (PRN):  acetaminophen     Tablet .. 650 milliGRAM(s) Oral every 6 hours PRN Temp greater or equal to 38C (100.4F), Mild Pain (1 - 3)  aluminum hydroxide/magnesium hydroxide/simethicone Suspension 30 milliLiter(s) Oral every 4 hours PRN Dyspepsia  melatonin 3 milliGRAM(s) Oral at bedtime PRN Insomnia  ondansetron Injectable 4 milliGRAM(s) IV Push every 8 hours PRN Nausea and/or Vomiting      Social History:  Cigarettes:                    Alchohol:                 Illicit Drug Abuse:      REVIEW OF SYSTEMS:    Constitutional: No fever, weight loss or fatigue  Eyes: No eye pain, visual disturbances, or discharge  ENMT:  No difficulty hearing, tinnitus, vertigo; No sinus or throat pain  Neck: No pain or stiffness  Respiratory: No cough, wheezing, chills or hemoptysis  Cardiovascular: No chest pain, palpitations, shortness of breath, dizziness or leg swelling  Gastrointestinal: No abdominal or epigastric pain. No nausea, vomiting or hematemesis; No diarrhea or constipation. No melena or hematochezia.  Genitourinary: No dysuria, frequency, hematuria or incontinence  Rectal: No pain, hemorrhoids or incontinence  Neurological: No headaches, memory loss, loss of strength, numbness or tremors  Skin: No itching, burning, rashes or lesions   Lymph Nodes: No enlarged glands  Endocrine: No heat or cold intolerance; No hair loss  Musculoskeletal: No joint pain or swelling; No muscle, back or extremity pain  Psychiatric: No depression, anxiety, mood swings or difficulty sleeping  Heme/Lymph: No easy bruising or bleeding gums  Allergy and Immunologic: No hives or eczema    ICU Vital Signs Last 24 Hrs  T(C): 36.7 (25 May 2025 07:49), Max: 36.8 (24 May 2025 21:01)  T(F): 98 (25 May 2025 07:49), Max: 98.3 (25 May 2025 00:20)  HR: 64 (25 May 2025 07:49) (64 - 98)  BP: 114/64 (25 May 2025 07:49) (114/64 - 160/87)  BP(mean): --  ABP: --  ABP(mean): --  RR: 18 (25 May 2025 07:49) (17 - 20)  SpO2: 98% (25 May 2025 07:49) (95% - 98%)    O2 Parameters below as of 25 May 2025 07:49  Patient On (Oxygen Delivery Method): room air        Parameters below as of 24 May 2025 04:14  Patient On (Oxygen Delivery Method): room air      ICU Vital Signs Last 24 Hrs  JULIO CESAR KAREN  I&O's Detail    I&O's Summary    Drug Dosing Weight  JULIO CESAR JONES      PHYSICAL EXAM:  General: Appears alert and cooperative.  HEENT: Head; normocephalic, atraumatic.  Eyes: Pupils reactive, cornea wnl.  Neck: Supple, no nodes adenopathy, no NVD or carotid bruit or thyromegaly.  CARDIOVASCULAR: Normal S1 and S2, No murmur, rub, gallop or lift.   LUNGS: No rales, rhonchi or wheeze. Normal breath sounds bilaterally.  ABDOMEN: Soft, nontender without mass or organomegaly. bowel sounds normoactive.  EXTREMITIES: No clubbing, cyanosis or edema. Distal pulses wnl.   SKIN: warm and dry with normal turgor.  NEURO: Alert/oriented x 3/normal motor exam. No pathologic reflexes.    PSYCH: normal affect.      >>> <<<  LABS:                        8.8    6.42  )-----------( 238      ( 24 May 2025 05:50 )             29.3         138  |  104  |  19.0  ----------------------------<  104[H]  3.9   |  21.0[L]  |  1.03    Ca    8.6      24 May 2025 05:50    TPro  6.6  /  Alb  4.0  /  TBili  <0.2[L]  /  DBili  x   /  AST  19  /  ALT  12  /  AlkPhos  123[H]      JULIO CESAR LAPINE        Urinalysis Basic - ( 24 May 2025 07:10 )    Color: Yellow / Appearance: Clear / S.015 / pH: x  Gluc: x / Ketone: x  / Bili: Negative / Urobili: 1.0 mg/dL   Blood: x / Protein: Negative mg/dL / Nitrite: Negative   Leuk Esterase: Negative / RBC: 0 /HPF / WBC 0 /HPF   Sq Epi: x / Non Sq Epi: 0 /HPF / Bacteria: Negative /HPF        RADIOLOGY & ADDITIONAL STUDIES:    INTERPRETATION OF TELEMETRY (personally reviewed):    ECG: NSR LAE LAD STT changes in lateral leads        ACTIVE PROBLEMS:  HEALTH ISSUES - PROBLEM Dx:  Abnormal CT of the chest            Assessment and Plan:    In summary,     JULIO CESAR JONES is an 64y Male active smoker with Hx of CAD S/P PCI BMS Prox LAD 2016 RESIDUAL 80% prox RCA 60% Ramus PVD S/P Endovascular AAA repair , S/P L femoral aneurysm repair s/p Bypass of L common femoral artery, moderate carotid disease followed by Dr West COPD Hx of ETOH abuse HCL HTN   Hx of prior Endocarditis AV treated with IV ATB therapy recently evaluated at [facility] for initial cardiology eval by Dr Luis Magaña  [facility] that starting around 2:30 in the morning he had trouble going to sleep due to his leg cramps but began to have chest discomfort in the middle of his chest, with associated right arm radiation.  Found with a saccular aneurysm vs penetrating ulcer in his descending aorta, seen by CT Surgery at the ER  Trop I mildly elevated BNP elevated CT evidence of pulmonary edema, no PE  Presently free of CP only c/o of R leg pain at this time    tele monitoring  Will proceed with R and L cardiac cath tuesday CAD and MR jama   NPO after midnight Monday night  Cont present cardiac therapy        DEVAUGHN ZALDIVAR MD, FACC, [place]AMIL[place]

## 2025-05-25 NOTE — PROGRESS NOTE ADULT - ASSESSMENT
63 y/o M w/ PMH of COPD (not on home O2), active smoking (2ppd for min of 40pck years), HTN, HLD, CAD s/p PCI, AAA s/p repair, remote hx of etoh use disorder (last drink 2010), HFmrEF (EF 45-50% 2016), psoriasis, PVD s/p RLE bypass (follows w/ vascular) presented to ED overnight c/o substernal chest pressure w/ associated Rt arm pain.  In ED pt initially placed on 6L NC but no reported hypoxia.  Now satting well on room air.  Clinically near euvolemic at this time and not bronchospastic on exam. Initial w/u significant for Hb 8.8, BNP >2K, trops 105-->113, Ddimer 587, EKG w/ no acute ischemic changes initially but repeat EKG now w/ ST depressions in I, II and V5.   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 The thoracic aorta measures 3.6 cm in diameter in this location. Significant stenosis of the proximal SMA suspected. Partially imaged 4.2 cm abdominal aortic aneurysm with peripheral mural thrombus.  s/p 40mg IV lasix, 324mg ASA, 40mg po prednisone and 1L NS given in ED.  Cardio and CTSx consulted.  Will admit for acute decompensated HF and r/o ACS.        Acute hypoxic respiratory failure 2/2 acute decompensated HFrEF  - euvolemic at this time, s/p IV lasix on admission  - hypoxia resolved  - Goal O2 sat 88-92% in light of underlying COPD  - [place] cardio following  - c/w IV lasix today  - daily weights, I/O's and fluid restricted diet  - telemetry monitoring    NSTEMI  - trops still trending up today  - EKGs today with persistent TWI in leads 1, aVL, V4-V6 and ST depressions V5-V6  - Ddimer 587, CTA w/o PE, venous duplex without DVT  - TTE w/ EF 40-45% and WMA  - [place] cardio following  - asa and lipitor 80mg  - lopressor 50mg q12  - daily weights, I/O's and fluid restricted diet  - telemetry monitoring  - lipid profile/a1c noted    Microcytic anemia   - Last blood work was from 2016 at which time pt noted to be anemic w/ baseline Hb 9-11  - Currently Hb 8.8 but could be lower from dilution in settiong of vol overload   - Hemodynamically stable at this time w/ no active bleeding reported however is an active smoker and at risk of malignancies   - Will check iron, b12, folate   - Refer to GI on d/c for outpt screening colonsocopy  - Monitor CBC and transfuse for Hb<8    PVD s/p RLE bypass  - Has chronic RLE pain that has been progressively worsening   - s/p CT RLE w/ contrast on 5/22 ordered by vascular and result pending   - Currently pulses palpable and RLE warm to touch   - Will get doppler to r/o VTE       Incidental CTA chest finding   - Found to have saccular aneurysm versus penetrating ulcer of the proximal descending thoracic aorta near left subclavian artery takeoff, thoracic aorta measures 3.6 cm in diameter in this location, significant stenosis of the proximal SMA suspected. Partially imaged 4.2 cm abdominal aortic aneurysm with peripheral mural thrombus.  - CTSx consulted and recommended high dose statin and BP control   - Per discussion w/ CTSx ok to heperinize pt for ACS and furthermore penetrating ulcer would not cause ST changes  - Also recommended to get repeat CT in 3 months and f/u w/ Dr. gill outpt but no acute surgical intervention warrented at this time        HTN / HLD  - Resume home ACEI and high intenstiy statin       Nicotine dependence  - prn nicotine patch   - Couseled on abstaining from smoking       VTE ppx:  heparin gtt     Dispo: Acute.  Low threshold for upgrade if decompensates.       63 y/o M w/ PMH of COPD (not on home O2), active smoking (2ppd for min of 40pck years), HTN, HLD, CAD s/p PCI, AAA s/p repair, remote hx of etoh use disorder (last drink 2010), HFmrEF (EF 45-50% 2016), psoriasis, PVD s/p RLE bypass (follows w/ vascular) presented to ED overnight c/o substernal chest pressure w/ associated Rt arm pain.  In ED pt initially placed on 6L NC but no reported hypoxia.  Now satting well on room air.  Clinically near euvolemic at this time and not bronchospastic on exam. Initial w/u significant for Hb 8.8, BNP >2K, trops 105-->113, Ddimer 587, EKG w/ no acute ischemic changes initially but repeat EKG now w/ ST depressions in I, II and V5.   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 The thoracic aorta measures 3.6 cm in diameter in this location. Significant stenosis of the proximal SMA suspected. Partially imaged 4.2 cm abdominal aortic aneurysm with peripheral mural thrombus.  s/p 40mg IV lasix, 324mg ASA, 40mg po prednisone and 1L NS given in ED.  Cardio and CTSx consulted.  Will admit for acute decompensated HF and r/o ACS.      Acute hypoxic respiratory failure 2/2 acute HFrEF  - euvolemic at this time, s/p IV lasix on admission  - hypoxia resolved  - Goal O2 sat 88-92% in light of underlying COPD  - [place] cardio following  - c/w IV lasix today  - daily weights, I/O's and fluid restricted diet  - telemetry monitoring    NSTEMI  - trops still trending up today  - EKGs today with persistent TWI in leads 1, aVL, V4-V6 and ST depressions V5-V6  - Ddimer 587, CTA w/o PE, venous duplex without DVT  - TTE w/ EF 40-45% and WMA  - [place] cardio following  - c/w heparin drip  - asa and lipitor 80mg  - lopressor 50mg q12  - daily weights, I/O's and fluid restricted diet  - telemetry monitoring  - lipid profile/a1c noted    Microcytic anemia   - Last blood work was from 2016 at which time pt noted to be anemic w/ baseline Hb 9-11  - hgb trend stable today  - labs show iron deficiency, given IV iron x1 today  - B12 wnl  - monitor CBC and transfuse for Hb<8  - outpatient age appropriate malignancy work ups on d/c    PVD s/p RLE bypass  - Has chronic RLE pain, better with movement according to the patient  - pulses palpable and RLE warm  - s/p CT RLE w/ contrast on 5/22 ordered by vascular and result pending   - LE venous duplex negative for DVT  - f/u with vascular as outpatient    Incidental CTA chest finding   - Found to have saccular aneurysm versus penetrating ulcer of the proximal descending thoracic aorta near left subclavian artery takeoff, significant stenosis of the proximal SMA suspected and 4.2 cm abdominal aortic aneurysm with peripheral mural thrombus.  - CTSx consulted, rec appreciated, no acute surgical intervention needed at this time   - c/w high dose statin  - BP control  - no acute surgical intervention needed at this time   - CTSx recommending repeat CT in 3 months and f/u w/ Dr. gill as outpatient    HTN / HLD  - hold home lisinopril while getting IV lasix  - lopressor 50mg BID  - can change to coreg if tighter BP control needed  - lipitor 80mg    Nicotine dependence  - prn nicotine patch   - counseled on abstaining from smoking     VTE ppx:  heparin drip   63 y/o M w/ PMH of COPD (not on home O2), active smoking (2ppd for min of 40pck years), HTN, HLD, CAD s/p PCI, AAA s/p repair, remote hx of etoh use disorder (last drink 2010), HFmrEF (EF 45-50% 2016), psoriasis, PVD s/p RLE bypass (follows w/ vascular) presented to ED overnight c/o substernal chest pressure w/ associated Rt arm pain.  In ED pt initially placed on 6L NC but no reported hypoxia.  Now satting well on room air.  Clinically near euvolemic at this time and not bronchospastic on exam. Initial w/u significant for Hb 8.8, BNP >2K, trops 105-->113, Ddimer 587, EKG w/ no acute ischemic changes initially but repeat EKG now w/ ST depressions in I, II and V5.   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 The thoracic aorta measures 3.6 cm in diameter in this location. Significant stenosis of the proximal SMA suspected. Partially imaged 4.2 cm abdominal aortic aneurysm with peripheral mural thrombus.  s/p 40mg IV lasix, 324mg ASA, 40mg po prednisone and 1L NS given in ED.  Cardio and CTSx consulted.  Will admit for acute decompensated HF and r/o ACS.      Acute hypoxic respiratory failure 2/2 acute HFrEF  - euvolemic at this time, s/p IV lasix on admission  - hypoxia resolved  - Goal O2 sat 88-92% in light of underlying COPD  - [place] cardio following  - c/w IV lasix today  - daily weights, I/O's and fluid restricted diet  - telemetry monitoring    NSTEMI  - trops still trending up today  - EKGs today with persistent TWI in leads 1, aVL, V4-V6 and ST depressions V5-V6  - Ddimer 587, CTA w/o PE, venous duplex without DVT  - TTE w/ EF 40-45% and WMA  - [place] cardio following  - c/w heparin drip  - planned for [facility] on [facility]/27  - asa and lipitor 80mg  - lopressor 50mg q12  - daily weights, I/O's and fluid restricted diet  - telemetry monitoring  - lipid profile/a1c noted    Microcytic anemia   - Last blood work was from 2016 at which time pt noted to be anemic w/ baseline Hb 9-11  - hgb trend stable today  - labs show iron deficiency, given IV iron x1 today  - B12 wnl  - monitor CBC and transfuse for Hb<8  - outpatient age appropriate malignancy work ups on d/c    PVD s/p RLE bypass  - Has chronic RLE pain, better with movement according to the patient  - pulses palpable and RLE warm  - s/p CT RLE w/ contrast on 5/22 ordered by vascular and result pending   - LE venous duplex negative for DVT  - f/u with vascular as outpatient    Incidental CTA chest finding   - Found to have saccular aneurysm versus penetrating ulcer of the proximal descending thoracic aorta near left subclavian artery takeoff, significant stenosis of the proximal SMA suspected and 4.2 cm abdominal aortic aneurysm with peripheral mural thrombus.  - CTSx consulted, rec appreciated, no acute surgical intervention needed at this time   - c/w high dose statin  - BP control  - no acute surgical intervention needed at this time   - CTSx recommending repeat CT in 3 months and f/u w/ Dr. gill as outpatient    HTN / HLD  - hold home lisinopril while getting IV lasix  - lopressor 50mg BID  - can change to coreg if tighter BP control needed  - lipitor 80mg    Nicotine dependence  - prn nicotine patch   - counseled on abstaining from smoking     VTE ppx:  heparin drip

## 2025-05-25 NOTE — PATIENT PROFILE ADULT - FALL HARM RISK - HARM RISK INTERVENTIONS

## 2025-05-25 NOTE — PROGRESS NOTE ADULT - SUBJECTIVE AND OBJECTIVE BOX
[facility] Division of Medicine    SUBJECTIVE / OVERNIGHT EVENTS: No overnight events as per RN. Pt seen at the bedside. Denies chest pain, palpitations, SOB, orthopnea. Denies any new complaints. All other systems reviewed and are negative.    MEDICATIONS  (STANDING):  aspirin  chewable 81 milliGRAM(s) Oral daily  atorvastatin 80 milliGRAM(s) Oral at bedtime  fluticasone propionate/ salmeterol 250-50 MICROgram(s) Diskus 1 Dose(s) Inhalation two times a day  furosemide   Injectable 40 milliGRAM(s) IV Push daily  heparin  Infusion.  Unit(s)/Hr (10 mL/Hr) IV Continuous <Continuous>  lisinopril 10 milliGRAM(s) Oral daily  metoprolol tartrate 50 milliGRAM(s) Oral two times a day    MEDICATIONS  (PRN):  acetaminophen     Tablet .. 650 milliGRAM(s) Oral every 6 hours PRN Temp greater or equal to 38C (100.4F), Mild Pain (1 - 3)  albuterol/ipratropium for Nebulization 3 milliLiter(s) Nebulizer every 6 hours PRN Shortness of Breath and/or Wheezing  aluminum hydroxide/magnesium hydroxide/simethicone Suspension 30 milliLiter(s) Oral every 4 hours PRN Dyspepsia  heparin   Injectable 5300 Unit(s) IV Push every 6 hours PRN For aPTT less than 40  melatonin 3 milliGRAM(s) Oral at bedtime PRN Insomnia  ondansetron Injectable 4 milliGRAM(s) IV Push every 8 hours PRN Nausea and/or Vomiting      I&O's Summary    24 May 2025 07:01  -  25 May 2025 07:00  --------------------------------------------------------  IN: 480 mL / OUT: 0 mL / NET: 480 mL        acetaminophen     Tablet .. 650 milliGRAM(s) Oral every 6 hours PRN  albuterol/ipratropium for Nebulization 3 milliLiter(s) Nebulizer every 6 hours PRN  aluminum hydroxide/magnesium hydroxide/simethicone Suspension 30 milliLiter(s) Oral every 4 hours PRN  aspirin  chewable 81 milliGRAM(s) Oral daily  atorvastatin 80 milliGRAM(s) Oral at bedtime  fluticasone propionate/ salmeterol 250-50 MICROgram(s) Diskus 1 Dose(s) Inhalation two times a day  furosemide   Injectable 40 milliGRAM(s) IV Push daily  heparin   Injectable 5300 Unit(s) IV Push every 6 hours PRN  heparin  Infusion.  Unit(s)/Hr IV Continuous <Continuous>  lisinopril 10 milliGRAM(s) Oral daily  melatonin 3 milliGRAM(s) Oral at bedtime PRN  metoprolol tartrate 50 milliGRAM(s) Oral two times a day  ondansetron Injectable 4 milliGRAM(s) IV Push every 8 hours PRN      PHYSICAL EXAM:  Vital Signs Last 24 Hrs  T(C): 36.7 (25 May 2025 07:49), Max: 36.8 (24 May 2025 21:01)  T(F): 98 (25 May 2025 07:49), Max: 98.3 (25 May 2025 00:20)  HR: 64 (25 May 2025 07:49) (64 - 88)  BP: 114/64 (25 May 2025 07:49) (114/64 - 160/87)  BP(mean): --  RR: 18 (25 May 2025 07:49) (17 - 20)  SpO2: 98% (25 May 2025 07:49) (95% - 98%)    Parameters below as of 25 May 2025 07:49  Patient On (Oxygen Delivery Method): room air      CONSTITUTIONAL: no apparent distress  RESP: No respiratory distress, clear to auscultation bilaterally, no crackles or wheezes  CV: RRR, +S1S2, no peripheral edema  GI: Soft, NT, ND  PSYCH: A+O x 3      LABS:                        9.6    9.57  )-----------( 272      ( 25 May 2025 06:50 )             32.4     05-25    137  |  101  |  19.4  ----------------------------<  104[H]  3.4[L]   |  21.0[L]  |  1.09    Ca    9.1      25 May 2025 06:50  Phos  2.6     05-25  Mg     2.2     05-25    TPro  7.0  /  Alb  4.0  /  TBili  0.4  /  DBili  x   /  AST  29  /  ALT  13  /  AlkPhos  103  05-25    PTT - ( 25 May 2025 06:50 )  PTT:76.3 sec      Urinalysis Basic - ( 25 May 2025 06:50 )    Color: x / Appearance: x / SG: x / pH: x  Gluc: 104 mg/dL / Ketone: x  / Bili: x / Urobili: x   Blood: x / Protein: x / Nitrite: x   Leuk Esterase: x / RBC: x / WBC x   Sq Epi: x / Non Sq Epi: x / Bacteria: x        CAPILLARY BLOOD GLUCOSE          IMAGING:    TTE W or WO Ultrasound Enhancing Agent (05.24.25 @ 15:23) >    CONCLUSIONS:      1. Left ventricular cavity is normal in size. Left ventricular wall thickness is normal. Left ventricular systolic function is mildly decreased with an ejection fraction visually estimated at 40 to 45 %. Global left ventricular hypokinesis.   2. There is mild (grade 1) left ventricular diastolic dysfunction, with normal left ventricular filling pressure.   3. Normal right ventricular cavity size, with normal wall thickness, and normal right ventricular systolic function.   4. Moderate to severe mitral regurgitation.   5. No pericardial effusion seen.   6. No prior echocardiogram is available for comparison.   7. Pulmonary artery systolic pressure could not be estimated.   8. Recommendations: Consider [facility] for evaluation of Mitral regurgitation.

## 2025-05-26 LAB
ANION GAP SERPL CALC-SCNC: 14 MMOL/L — SIGNIFICANT CHANGE UP (ref 5–17)
APTT BLD: 41.4 SEC — HIGH (ref 26.1–36.8)
APTT BLD: 45.2 SEC — HIGH (ref 26.1–36.8)
APTT BLD: 77.8 SEC — HIGH (ref 26.1–36.8)
BUN SERPL-MCNC: 29.9 MG/DL — HIGH (ref 8–20)
CALCIUM SERPL-MCNC: 8.9 MG/DL — SIGNIFICANT CHANGE UP (ref 8.4–10.5)
CHLORIDE SERPL-SCNC: 107 MMOL/L — SIGNIFICANT CHANGE UP (ref 96–108)
CO2 SERPL-SCNC: 20 MMOL/L — LOW (ref 22–29)
CREAT SERPL-MCNC: 1.04 MG/DL — SIGNIFICANT CHANGE UP (ref 0.5–1.3)
EGFR: 80 ML/MIN/1.73M2 — SIGNIFICANT CHANGE UP
EGFR: 80 ML/MIN/1.73M2 — SIGNIFICANT CHANGE UP
FERRITIN SERPL-MCNC: 65 NG/ML — SIGNIFICANT CHANGE UP (ref 30–400)
GLUCOSE SERPL-MCNC: 80 MG/DL — SIGNIFICANT CHANGE UP (ref 70–99)
HCT VFR BLD CALC: 32.3 % — LOW (ref 39–50)
HGB BLD-MCNC: 9.5 G/DL — LOW (ref 13–17)
IRON SATN MFR SERPL: 266 UG/DL — HIGH (ref 59–158)
IRON SATN MFR SERPL: 58 % — HIGH (ref 16–55)
MAGNESIUM SERPL-MCNC: 2.4 MG/DL — SIGNIFICANT CHANGE UP (ref 1.6–2.6)
MCHC RBC-ENTMCNC: 23.1 PG — LOW (ref 27–34)
MCHC RBC-ENTMCNC: 29.4 G/DL — LOW (ref 32–36)
MCV RBC AUTO: 78.4 FL — LOW (ref 80–100)
NRBC # BLD AUTO: 0 K/UL — SIGNIFICANT CHANGE UP (ref 0–0)
NRBC # FLD: 0 K/UL — SIGNIFICANT CHANGE UP (ref 0–0)
NRBC BLD AUTO-RTO: 0 /100 WBCS — SIGNIFICANT CHANGE UP (ref 0–0)
PLATELET # BLD AUTO: 266 K/UL — SIGNIFICANT CHANGE UP (ref 150–400)
PMV BLD: 11 FL — SIGNIFICANT CHANGE UP (ref 7–13)
POTASSIUM SERPL-MCNC: 3.9 MMOL/L — SIGNIFICANT CHANGE UP (ref 3.5–5.3)
POTASSIUM SERPL-SCNC: 3.9 MMOL/L — SIGNIFICANT CHANGE UP (ref 3.5–5.3)
RBC # BLD: 4.12 M/UL — LOW (ref 4.2–5.8)
RBC # FLD: 17.2 % — HIGH (ref 10.3–14.5)
SODIUM SERPL-SCNC: 141 MMOL/L — SIGNIFICANT CHANGE UP (ref 135–145)
TIBC SERPL-MCNC: 458 UG/DL — HIGH (ref 220–430)
TRANSFERRIN SERPL-MCNC: 320 MG/DL — SIGNIFICANT CHANGE UP (ref 180–329)
TROPONIN T, HIGH SENSITIVITY RESULT: 368 NG/L — HIGH (ref 0–51)
TSH SERPL-MCNC: 1.31 UIU/ML — SIGNIFICANT CHANGE UP (ref 0.27–4.2)
WBC # BLD: 7.28 K/UL — SIGNIFICANT CHANGE UP (ref 3.8–10.5)
WBC # FLD AUTO: 7.28 K/UL — SIGNIFICANT CHANGE UP (ref 3.8–10.5)

## 2025-05-26 PROCEDURE — 99233 SBSQ HOSP IP/OBS HIGH 50: CPT

## 2025-05-26 RX ORDER — FUROSEMIDE 10 MG/ML
40 INJECTION INTRAMUSCULAR; INTRAVENOUS DAILY
Refills: 0 | Status: DISCONTINUED | OUTPATIENT
Start: 2025-05-27 | End: 2025-06-03

## 2025-05-26 RX ADMIN — HEPARIN SODIUM 1350 UNIT(S)/HR: 1000 INJECTION INTRAVENOUS; SUBCUTANEOUS at 08:03

## 2025-05-26 RX ADMIN — Medication 1 DOSE(S): at 08:43

## 2025-05-26 RX ADMIN — FUROSEMIDE 40 MILLIGRAM(S): 10 INJECTION INTRAMUSCULAR; INTRAVENOUS at 05:17

## 2025-05-26 RX ADMIN — METOPROLOL SUCCINATE 50 MILLIGRAM(S): 50 TABLET, EXTENDED RELEASE ORAL at 18:17

## 2025-05-26 RX ADMIN — HEPARIN SODIUM 1450 UNIT(S)/HR: 1000 INJECTION INTRAVENOUS; SUBCUTANEOUS at 21:46

## 2025-05-26 RX ADMIN — METOPROLOL SUCCINATE 50 MILLIGRAM(S): 50 TABLET, EXTENDED RELEASE ORAL at 05:17

## 2025-05-26 RX ADMIN — HEPARIN SODIUM 1550 UNIT(S)/HR: 1000 INJECTION INTRAVENOUS; SUBCUTANEOUS at 19:13

## 2025-05-26 RX ADMIN — ATORVASTATIN CALCIUM 80 MILLIGRAM(S): 80 TABLET, FILM COATED ORAL at 21:08

## 2025-05-26 RX ADMIN — Medication 81 MILLIGRAM(S): at 13:30

## 2025-05-26 RX ADMIN — HEPARIN SODIUM 1150 UNIT(S)/HR: 1000 INJECTION INTRAVENOUS; SUBCUTANEOUS at 07:22

## 2025-05-26 RX ADMIN — IPRATROPIUM BROMIDE AND ALBUTEROL SULFATE 3 MILLILITER(S): .5; 2.5 SOLUTION RESPIRATORY (INHALATION) at 08:44

## 2025-05-26 RX ADMIN — HEPARIN SODIUM 1550 UNIT(S)/HR: 1000 INJECTION INTRAVENOUS; SUBCUTANEOUS at 14:55

## 2025-05-26 RX ADMIN — Medication 1 DOSE(S): at 20:16

## 2025-05-26 RX ADMIN — HEPARIN SODIUM 1350 UNIT(S)/HR: 1000 INJECTION INTRAVENOUS; SUBCUTANEOUS at 13:32

## 2025-05-26 NOTE — PROGRESS NOTE ADULT - ASSESSMENT
65 y/o M w/ PMH of COPD (not on home O2), active smoking (2ppd for min of 40pck years), HTN, HLD, CAD s/p PCI, AAA s/p repair, remote hx of etoh use disorder (last drink 2010), HFmrEF (EF 45-50% 2016), psoriasis, PVD s/p RLE bypass (follows w/ vascular) presented to ED overnight c/o substernal chest pressure w/ associated Rt arm pain.  In ED pt initially placed on 6L NC but no reported hypoxia.  Now satting well on room air.  Clinically near euvolemic at this time and not bronchospastic on exam. Initial w/u significant for Hb 8.8, BNP >2K, trops 105-->113, Ddimer 587, EKG w/ no acute ischemic changes initially but repeat EKG now w/ ST depressions in I, II and V5.   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 The thoracic aorta measures 3.6 cm in diameter in this location. Significant stenosis of the proximal SMA suspected. Partially imaged 4.2 cm abdominal aortic aneurysm with peripheral mural thrombus.  s/p 40mg IV lasix, 324mg ASA, 40mg po prednisone and 1L NS given in ED.  Cardio and CTSx consulted.  Will admit for acute decompensated HF and r/o ACS.      Acute hypoxic respiratory failure 2/2 acute HFrEF  - now resolved, on RA, euvolemic today  - change IV lasix to 40mg PO qd  - Goal O2 sat 88-92% in light of underlying COPD  - [place] cardio following  - daily weights, I/O's and fluid restricted diet  - telemetry monitoring    NSTEMI  - EKGs 5/25 with persistent TWI in leads 1, aVL, V4-V6 and ST depressions V5-V6  - Ddimer 587, CTA w/o PE, venous duplex without DVT  - TTE w/ EF 40-45% and WMA  - [place] cardio following  - c/w heparin drip  - planned for [facility] on 5/27  - asa and lipitor 80mg  - lopressor 50mg q12  - telemetry monitoring  - lipid profile/a1c noted    Microcytic anemia   - Last blood work was from 2016 at which time pt noted to be anemic w/ baseline Hb 9-11  - hgb trend stable today  - labs show iron deficiency, s/p 1 dose IV iron  - B12 wnl  - monitor CBC and transfuse for Hb<8  - outpatient age appropriate malignancy work ups on d/c    PVD s/p RLE bypass  - Has chronic RLE pain, better with movement according to the patient  - pulses palpable and RLE warm  - s/p CT RLE w/ contrast on 5/22 ordered by vascular and result pending   - LE venous duplex negative for DVT  - f/u with vascular as outpatient    Incidental CTA chest finding   - Found to have saccular aneurysm versus penetrating ulcer of the proximal descending thoracic aorta near left subclavian artery takeoff, significant stenosis of the proximal SMA suspected and 4.2 cm abdominal aortic aneurysm with peripheral mural thrombus.  - CTSx consulted, rec appreciated, no acute surgical intervention needed at this time   - c/w high dose statin  - BP control  - no acute surgical intervention needed at this time   - CTSx recommending repeat CT in 3 months and f/u w/ Dr. Barnes as outpatient    HTN / HLD  - hold home lisinopril while getting IV lasix  - lopressor 50mg BID  - can change to coreg if tighter BP control needed  - lipitor 80mg    Nicotine dependence  - prn nicotine patch   - counseled on abstaining from smoking     VTE ppx:  heparin drip     63 y/o M w/ PMH of COPD (not on home O2), active smoking (2ppd for min of 40pck years), HTN, HLD, CAD s/p PCI, AAA s/p repair, remote hx of etoh use disorder (last drink 2010), HFmrEF (EF 45-50% 2016), psoriasis, PVD s/p RLE bypass (follows w/ vascular) presented to ED overnight c/o substernal chest pressure w/ associated Rt arm pain.  In ED pt initially placed on 6L NC but no reported hypoxia.  Now satting well on room air.  Clinically near euvolemic at this time and not bronchospastic on exam. Initial w/u significant for Hb 8.8, BNP >2K, trops 105-->113, Ddimer 587, EKG w/ no acute ischemic changes initially but repeat EKG now w/ ST depressions in I, II and V5.   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 The thoracic aorta measures 3.6 cm in diameter in this location. Significant stenosis of the proximal SMA suspected. Partially imaged 4.2 cm abdominal aortic aneurysm with peripheral mural thrombus.  s/p 40mg IV lasix, 324mg ASA, 40mg po prednisone and 1L NS given in ED.  Cardio and CTSx consulted.  Will admit for acute decompensated HF and r/o ACS.      Acute hypoxic respiratory failure 2/2 acute HFrEF  - now resolved, on RA, euvolemic today  - change IV lasix to 40mg PO qd  - Goal O2 sat 88-92% in light of underlying COPD  - [place] cardio following  - daily weights, I/O's and fluid restricted diet  - telemetry monitoring    NSTEMI  - EKGs 5/25 with persistent TWI in leads 1, aVL, V4-V6 and ST depressions V5-V6  - Ddimer 587, CTA w/o PE, venous duplex without DVT  - TTE w/ EF 40-45% and WMA  - [place] cardio following  - c/w heparin drip  - planned for [facility] on 5/27  - asa and lipitor 80mg  - lopressor 50mg q12  - telemetry monitoring  - lipid profile/a1c noted    Microcytic anemia   - Last blood work was from 2016 at which time pt noted to be anemic w/ baseline Hb 9-11  - hgb trend stable today  - labs show iron deficiency, s/p 1 dose IV iron  - B12 wnl  - monitor CBC and transfuse for Hb<8  - outpatient age appropriate malignancy work ups on d/c    PVD s/p RLE bypass  - Has chronic RLE pain, better with movement according to the patient  - pulses palpable and RLE warm  - s/p CT RLE w/ contrast on 5/22 ordered by vascular and result pending   - LE venous duplex negative for DVT  - f/u with vascular as outpatient    Incidental CTA chest finding   - Found to have saccular aneurysm versus penetrating ulcer of the proximal descending thoracic aorta near left subclavian artery takeoff, significant stenosis of the proximal SMA suspected and 4.2 cm abdominal aortic aneurysm with peripheral mural thrombus.  - CTSx consulted, rec appreciated, no acute surgical intervention needed at this time   - c/w high dose statin  - BP control  - no acute surgical intervention needed at this time   - CTSx recommending repeat CT in 3 months and f/u w/ Dr. Barnes as outpatient    HTN / HLD  - hold home lisinopril while getting IV lasix  - lopressor 50mg BID  - can change to coreg if tighter BP control needed  - lipitor 80mg    Nicotine dependence  - prn nicotine patch   - counseled on abstaining from smoking     VTE ppx:  heparin drip  Dispo: pending R/LHC results and cardio f/u

## 2025-05-26 NOTE — DIETITIAN INITIAL EVALUATION ADULT - ADD RECOMMEND
- Continue diet as tolerated  - Rx MVI daily, folic acid, thiamine daily  - Monitor weights for trend/accuracy

## 2025-05-26 NOTE — DIETITIAN INITIAL EVALUATION ADULT - OTHER INFO
63 y/o M w/ PMH of COPD (not on home O2), active smoking (2ppd for min of 40pck years), HTN, HLD, CAD s/p PCI, AAA s/p repair, remote hx of etoh use. admitted for acute decompensated HF and r/o ACS.    #Acute hypoxic respiratory failure 2/2 acute HFrEF

## 2025-05-26 NOTE — PROGRESS NOTE ADULT - SUBJECTIVE AND OBJECTIVE BOX
[facility] Division of Medicine    SUBJECTIVE / OVERNIGHT EVENTS: No overnight events as per RN. Pt seen at the bedside. Denies chest pain, palpitations, SOB, orthopnea. All other systems reviewed and are negative.    MEDICATIONS  (STANDING):  aspirin  chewable 81 milliGRAM(s) Oral daily  atorvastatin 80 milliGRAM(s) Oral at bedtime  fluticasone propionate/ salmeterol 250-50 MICROgram(s) Diskus 1 Dose(s) Inhalation two times a day  heparin  Infusion.  Unit(s)/Hr (10 mL/Hr) IV Continuous <Continuous>  metoprolol tartrate 50 milliGRAM(s) Oral two times a day    MEDICATIONS  (PRN):  acetaminophen     Tablet .. 650 milliGRAM(s) Oral every 6 hours PRN Temp greater or equal to 38C (100.4F), Mild Pain (1 - 3)  albuterol/ipratropium for Nebulization 3 milliLiter(s) Nebulizer every 6 hours PRN Shortness of Breath and/or Wheezing  aluminum hydroxide/magnesium hydroxide/simethicone Suspension 30 milliLiter(s) Oral every 4 hours PRN Dyspepsia  heparin   Injectable 5300 Unit(s) IV Push every 6 hours PRN For aPTT less than 40  melatonin 3 milliGRAM(s) Oral at bedtime PRN Insomnia  ondansetron Injectable 4 milliGRAM(s) IV Push every 8 hours PRN Nausea and/or Vomiting      I&O's Summary    25 May 2025 07:01  -  26 May 2025 07:00  --------------------------------------------------------  IN: 1474.5 mL / OUT: 0 mL / NET: 1474.5 mL        acetaminophen     Tablet .. 650 milliGRAM(s) Oral every 6 hours PRN  albuterol/ipratropium for Nebulization 3 milliLiter(s) Nebulizer every 6 hours PRN  aluminum hydroxide/magnesium hydroxide/simethicone Suspension 30 milliLiter(s) Oral every 4 hours PRN  aspirin  chewable 81 milliGRAM(s) Oral daily  atorvastatin 80 milliGRAM(s) Oral at bedtime  fluticasone propionate/ salmeterol 250-50 MICROgram(s) Diskus 1 Dose(s) Inhalation two times a day  heparin   Injectable 5300 Unit(s) IV Push every 6 hours PRN  heparin  Infusion.  Unit(s)/Hr IV Continuous <Continuous>  melatonin 3 milliGRAM(s) Oral at bedtime PRN  metoprolol tartrate 50 milliGRAM(s) Oral two times a day  ondansetron Injectable 4 milliGRAM(s) IV Push every 8 hours PRN      PHYSICAL EXAM:  Vital Signs Last 24 Hrs  T(C): 36.4 (26 May 2025 07:48), Max: 36.8 (26 May 2025 05:12)  T(F): 97.6 (26 May 2025 07:48), Max: 98.2 (26 May 2025 05:12)  HR: 73 (26 May 2025 08:40) (55 - 80)  BP: 98/52 (26 May 2025 07:48) (98/52 - 138/84)  BP(mean): --  RR: 18 (26 May 2025 07:48) (18 - 18)  SpO2: 93% (26 May 2025 08:40) (93% - 99%)    Parameters below as of 26 May 2025 08:40  Patient On (Oxygen Delivery Method): room air        CONSTITUTIONAL: no apparent distress  RESP: No respiratory distress, clear to auscultation bilaterally, no crackles or wheezes  CV: RRR, +S1S2, no peripheral edema  GI: Soft, NT, ND  PSYCH: A+O x 3        LABS:                        9.5    7.28  )-----------( 266      ( 26 May 2025 05:30 )             32.3     05-26    141  |  107  |  29.9[H]  ----------------------------<  80  3.9   |  20.0[L]  |  1.04    Ca    8.9      26 May 2025 05:30  Phos  2.6     05-25  Mg     2.4     05-26    TPro  7.0  /  Alb  4.0  /  TBili  0.4  /  DBili  x   /  AST  29  /  ALT  13  /  AlkPhos  103  05-25    PTT - ( 26 May 2025 05:30 )  PTT:45.2 sec      Urinalysis Basic - ( 26 May 2025 05:30 )    Color: x / Appearance: x / SG: x / pH: x  Gluc: 80 mg/dL / Ketone: x  / Bili: x / Urobili: x   Blood: x / Protein: x / Nitrite: x   Leuk Esterase: x / RBC: x / WBC x   Sq Epi: x / Non Sq Epi: x / Bacteria: x        Culture - Urine (collected 24 May 2025 07:10)  Source: Clean Catch Clean Catch (Midstream)  Final Report (25 May 2025 14:13):    <10,000 CFU/mL Normal Urogenital Monica      CAPILLARY BLOOD GLUCOSE          IMAGING:      TTE W or WO Ultrasound Enhancing Agent (05.24.25 @ 15:23) >    CONCLUSIONS:      1. Left ventricular cavity is normal in size. Left ventricular wall thickness is normal. Left ventricular systolic function is mildly decreased with an ejection fraction visually estimated at 40 to 45 %. Global left ventricular hypokinesis.   2. There is mild (grade 1) left ventricular diastolic dysfunction, with normal left ventricular filling pressure.   3. Normal right ventricular cavity size, with normal wall thickness, and normal right ventricular systolic function.   4. Moderate to severe mitral regurgitation.   5. No pericardial effusion seen.   6. No prior echocardiogram is available for comparison.   7. Pulmonary artery systolic pressure could not be estimated.   8. Recommendations: Consider [facility] for evaluation of Mitral regurgitation.

## 2025-05-26 NOTE — DIETITIAN INITIAL EVALUATION ADULT - PERTINENT LABORATORY DATA
05-26 Na141 mmol/L Glu 80 mg/dL K+ 3.9 mmol/L Cr  1.04 mg/dL BUN 29.9 mg/dL[H]  A1C with Estimated Average Glucose Result: 5.9 % (05-25-25 @ 06:50)

## 2025-05-26 NOTE — DIETITIAN INITIAL EVALUATION ADULT - NS FNS DIET ORDER
Diet, NPO after Midnight:      NPO Start Date: 26-May-2025,   NPO Start Time: 23:59 (25 @ 06:17) [Active]  Diet, DASH/TLC:   Sodium & Cholesterol Restricted  1500mL Fluid Restriction ([id number])     Special Instructions for Nursin milliLiter(s) to 2000 milliLiter(s) fluid restriction (25 @ 13:06) [Active]

## 2025-05-26 NOTE — DIETITIAN INITIAL EVALUATION ADULT - NSICDXPASTMEDICALHX_GEN_ALL_CORE_FT
PAST MEDICAL HISTORY:  AAA (abdominal aortic aneurysm)     Alcoholism /alcohol abuse Last drink 2010    Aneurysm artery, femoral left    Coronary artery disease involving native coronary artery of native heart without angina pectoris PCI at [facility]    Hypercholesterolemia     Hypertension

## 2025-05-26 NOTE — PROGRESS NOTE ADULT - SUBJECTIVE AND OBJECTIVE BOX
[facility] - [facility], THE HEART CENTER                                   [address] [place] [address]                                                      PHONE: [phone]                                                         FAX: [phone]  [email or web address]  ---------------------------------------------------------------------------------------------------------------------------------    Overnight events/patient complaints:  no events     PAST MEDICAL & SURGICAL HISTORY:  Hypertension      Hypercholesterolemia      AAA (abdominal aortic aneurysm)      Alcoholism /alcohol abuse  Last drink 2010      Aneurysm artery, femoral  left      Coronary artery disease involving native coronary artery of native heart without angina pectoris  PCI at [facility]      AAA (abdominal aortic aneurysm)  Endurant II Abdominal l Stent Medtronic Graft Implanted      History of extremity bypass graft  Right      H/O inguinal hernia  x2      S/P appendectomy      Abdominal adhesions  Around small bowel      H/O cardiac catheterization          No Known Allergies    MEDICATIONS  (STANDING):  aspirin  chewable 81 milliGRAM(s) Oral daily  atorvastatin 80 milliGRAM(s) Oral at bedtime  fluticasone propionate/ salmeterol 250-50 MICROgram(s) Diskus 1 Dose(s) Inhalation two times a day  furosemide   Injectable 40 milliGRAM(s) IV Push daily  heparin  Infusion.  Unit(s)/Hr (10 mL/Hr) IV Continuous <Continuous>  metoprolol tartrate 50 milliGRAM(s) Oral two times a day    MEDICATIONS  (PRN):  acetaminophen     Tablet .. 650 milliGRAM(s) Oral every 6 hours PRN Temp greater or equal to 38C (100.4F), Mild Pain (1 - 3)  albuterol/ipratropium for Nebulization 3 milliLiter(s) Nebulizer every 6 hours PRN Shortness of Breath and/or Wheezing  aluminum hydroxide/magnesium hydroxide/simethicone Suspension 30 milliLiter(s) Oral every 4 hours PRN Dyspepsia  heparin   Injectable 5300 Unit(s) IV Push every 6 hours PRN For aPTT less than 40  melatonin 3 milliGRAM(s) Oral at bedtime PRN Insomnia  ondansetron Injectable 4 milliGRAM(s) IV Push every 8 hours PRN Nausea and/or Vomiting      Vital Signs Last 24 Hrs  T(C): 36.4 (26 May 2025 07:48), Max: 36.8 (26 May 2025 05:12)  T(F): 97.6 (26 May 2025 07:48), Max: 98.2 (26 May 2025 05:12)  HR: 73 (26 May 2025 08:40) (55 - 80)  BP: 98/52 (26 May 2025 07:48) (98/52 - 138/84)  BP(mean): --  RR: 18 (26 May 2025 07:48) (18 - 18)  SpO2: 93% (26 May 2025 08:40) (93% - 99%)    Parameters below as of 26 May 2025 08:40  Patient On (Oxygen Delivery Method): room air      ICU Vital Signs Last 24 Hrs  JULIO CESAR JONES  I&O's Detail    25 May 2025 07:01  -  26 May 2025 07:00  --------------------------------------------------------  IN:    Heparin Infusion: 34.5 mL    Oral Fluid: 1440 mL  Total IN: 1474.5 mL    OUT:  Total OUT: 0 mL    Total NET: 1474.5 mL        I&O's Summary    25 May 2025 07:01  -  26 May 2025 07:00  --------------------------------------------------------  IN: 1474.5 mL / OUT: 0 mL / NET: 1474.5 mL      Drug Dosing Weight  JULIO CESAR JONES      PHYSICAL EXAM:  General: Appears well developed, well nourished alert and cooperative.  HEENT: Head; normocephalic, atraumatic.  Eyes: Pupils reactive, cornea wnl.  Neck: Supple, no nodes adenopathy, no NVD or carotid bruit or thyromegaly.  CARDIOVASCULAR: Normal S1 and S2, No murmur, rub, gallop or lift.   LUNGS: No rales, rhonchi or wheeze. Normal breath sounds bilaterally.  ABDOMEN: Soft, nontender without mass or organomegaly. bowel sounds normoactive.  EXTREMITIES: No clubbing, cyanosis or edema. Distal pulses wnl.   SKIN: warm and dry with normal turgor.  NEURO: Alert/oriented x 3/normal motor exam. No pathologic reflexes.    PSYCH: normal affect.        LABS:                        9.5    7.28  )-----------( 266      ( 26 May 2025 05:30 )             32.3     05-26    141  |  107  |  29.9[H]  ----------------------------<  80  3.9   |  20.0[L]  |  1.04    Ca    8.9      26 May 2025 05:30  Phos  2.6     05-25  Mg     2.4     05-26    TPro  7.0  /  Alb  4.0  /  TBili  0.4  /  DBili  x   /  AST  29  /  ALT  13  /  AlkPhos  103  05-25    JULIO CESAR LAPINE      PTT - ( 26 May 2025 05:30 )  PTT:45.2 sec  Urinalysis Basic - ( 26 May 2025 05:30 )    Color: x / Appearance: x / SG: x / pH: x  Gluc: 80 mg/dL / Ketone: x  / Bili: x / Urobili: x   Blood: x / Protein: x / Nitrite: x   Leuk Esterase: x / RBC: x / WBC x   Sq Epi: x / Non Sq Epi: x / Bacteria: x       ASSESSMENT AND PLAN:  In summary,  JULIO CESAR JONES is an 64y Male active smoker with Hx of CAD S/P PCI BMS Prox LAD 2016 RESIDUAL 80% prox RCA 60% Ramus PVD S/P Endovascular AAA repair , S/P L femoral aneurysm repair s/p Bypass of L common femoral artery, moderate carotid disease followed by Dr West COPD Hx of ETOH abuse HCL HTN   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  Found with a saccular aneurysm vs penetrating ulcer in his descending aorta, seen by CT Surgery at the ER  Trop I mildly elevated BNP elevated CT evidence of pulmonary edema, no PE  Presently free of CP only c/o of R leg pain at this time    Pt planned for R+L Heart Cath   NPo after midnight  tele  will follow with you        Thank you for allowing [facility] to participate in the care of this patient.  Please feel free to call with any questions or concerns.                  [facility] - [facility], THE HEART CENTER                                   [address] [place] [address]                                                      PHONE: [phone]                                                         FAX: [phone]  [email or web address]  ---------------------------------------------------------------------------------------------------------------------------------    Overnight events/patient complaints:  no events     PAST MEDICAL & SURGICAL HISTORY:  Hypertension      Hypercholesterolemia      AAA (abdominal aortic aneurysm)      Alcoholism /alcohol abuse  Last drink 2010      Aneurysm artery, femoral  left      Coronary artery disease involving native coronary artery of native heart without angina pectoris  PCI at [facility]      AAA (abdominal aortic aneurysm)  Endurant II Abdominal l Stent Medtronic Graft Implanted      History of extremity bypass graft  Right      H/O inguinal hernia  x2      S/P appendectomy      Abdominal adhesions  Around small bowel      H/O cardiac catheterization          No Known Allergies    MEDICATIONS  (STANDING):  aspirin  chewable 81 milliGRAM(s) Oral daily  atorvastatin 80 milliGRAM(s) Oral at bedtime  fluticasone propionate/ salmeterol 250-50 MICROgram(s) Diskus 1 Dose(s) Inhalation two times a day  furosemide   Injectable 40 milliGRAM(s) IV Push daily  heparin  Infusion.  Unit(s)/Hr (10 mL/Hr) IV Continuous <Continuous>  metoprolol tartrate 50 milliGRAM(s) Oral two times a day    MEDICATIONS  (PRN):  acetaminophen     Tablet .. 650 milliGRAM(s) Oral every 6 hours PRN Temp greater or equal to 38C (100.4F), Mild Pain (1 - 3)  albuterol/ipratropium for Nebulization 3 milliLiter(s) Nebulizer every 6 hours PRN Shortness of Breath and/or Wheezing  aluminum hydroxide/magnesium hydroxide/simethicone Suspension 30 milliLiter(s) Oral every 4 hours PRN Dyspepsia  heparin   Injectable 5300 Unit(s) IV Push every 6 hours PRN For aPTT less than 40  melatonin 3 milliGRAM(s) Oral at bedtime PRN Insomnia  ondansetron Injectable 4 milliGRAM(s) IV Push every 8 hours PRN Nausea and/or Vomiting      Vital Signs Last 24 Hrs  T(C): 36.4 (26 May 2025 07:48), Max: 36.8 (26 May 2025 05:12)  T(F): 97.6 (26 May 2025 07:48), Max: 98.2 (26 May 2025 05:12)  HR: 73 (26 May 2025 08:40) (55 - 80)  BP: 98/52 (26 May 2025 07:48) (98/52 - 138/84)  BP(mean): --  RR: 18 (26 May 2025 07:48) (18 - 18)  SpO2: 93% (26 May 2025 08:40) (93% - 99%)    Parameters below as of 26 May 2025 08:40  Patient On (Oxygen Delivery Method): room air      ICU Vital Signs Last 24 Hrs  JULIO CESAR JONES  I&O's Detail    25 May 2025 07:01  -  26 May 2025 07:00  --------------------------------------------------------  IN:    Heparin Infusion: 34.5 mL    Oral Fluid: 1440 mL  Total IN: 1474.5 mL    OUT:  Total OUT: 0 mL    Total NET: 1474.5 mL        I&O's Summary    25 May 2025 07:01  -  26 May 2025 07:00  --------------------------------------------------------  IN: 1474.5 mL / OUT: 0 mL / NET: 1474.5 mL      Drug Dosing Weight  JULIO CESAR JONES      PHYSICAL EXAM:  General: Appears well developed, well nourished alert and cooperative.  HEENT: Head; normocephalic, atraumatic.  Eyes: Pupils reactive, cornea wnl.  Neck: Supple, no nodes adenopathy, no NVD or carotid bruit or thyromegaly.  CARDIOVASCULAR: Normal S1 and S2, No murmur, rub, gallop or lift.   LUNGS: No rales, rhonchi or wheeze. Normal breath sounds bilaterally.  ABDOMEN: Soft, nontender without mass or organomegaly. bowel sounds normoactive.  EXTREMITIES: No clubbing, cyanosis or edema. Distal pulses wnl.   SKIN: warm and dry with normal turgor.  NEURO: Alert/oriented x 3/normal motor exam. No pathologic reflexes.    PSYCH: normal affect.        LABS:                        9.5    7.28  )-----------( 266      ( 26 May 2025 05:30 )             32.3     05-26    141  |  107  |  29.9[H]  ----------------------------<  80  3.9   |  20.0[L]  |  1.04    Ca    8.9      26 May 2025 05:30  Phos  2.6     05-25  Mg     2.4     05-26    TPro  7.0  /  Alb  4.0  /  TBili  0.4  /  DBili  x   /  AST  29  /  ALT  13  /  AlkPhos  103  05-25    JULIO CESAR LAPINE      PTT - ( 26 May 2025 05:30 )  PTT:45.2 sec  Urinalysis Basic - ( 26 May 2025 05:30 )    Color: x / Appearance: x / SG: x / pH: x  Gluc: 80 mg/dL / Ketone: x  / Bili: x / Urobili: x   Blood: x / Protein: x / Nitrite: x   Leuk Esterase: x / RBC: x / WBC x   Sq Epi: x / Non Sq Epi: x / Bacteria: x       ASSESSMENT AND PLAN:  In summary,  JULIO CESAR JONES is an 64y Male active smoker with Hx of CAD S/P PCI BMS Prox LAD 2016 RESIDUAL 80% prox RCA 60% Ramus PVD S/P Endovascular AAA repair , S/P L femoral aneurysm repair s/p Bypass of L common femoral artery, moderate carotid disease followed by Dr West COPD Hx of ETOH abuse HCL HTN   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  Found with a saccular aneurysm vs penetrating ulcer in his descending aorta, seen by CT Surgery at the ER  Trop I mildly elevated BNP elevated CT evidence of pulmonary edema, no PE  Presently free of CP only c/o of R leg pain at this time    Pt planned for R+L Heart Cath   NPO after midnight  tele  will follow with you      Thank you for allowing [facility] to participate in the care of this patient.  Please feel free to call with any questions or concerns.

## 2025-05-27 LAB
APTT BLD: 58.6 SEC — HIGH (ref 26.1–36.8)
HCT VFR BLD CALC: 32.4 % — LOW (ref 39–50)
HGB BLD-MCNC: 9.6 G/DL — LOW (ref 13–17)
MCHC RBC-ENTMCNC: 23.2 PG — LOW (ref 27–34)
MCHC RBC-ENTMCNC: 29.6 G/DL — LOW (ref 32–36)
MCV RBC AUTO: 78.5 FL — LOW (ref 80–100)
NRBC # BLD AUTO: 0 K/UL — SIGNIFICANT CHANGE UP (ref 0–0)
NRBC # FLD: 0 K/UL — SIGNIFICANT CHANGE UP (ref 0–0)
NRBC BLD AUTO-RTO: 0 /100 WBCS — SIGNIFICANT CHANGE UP (ref 0–0)
PLATELET # BLD AUTO: 255 K/UL — SIGNIFICANT CHANGE UP (ref 150–400)
PMV BLD: 11.2 FL — SIGNIFICANT CHANGE UP (ref 7–13)
RBC # BLD: 4.13 M/UL — LOW (ref 4.2–5.8)
RBC # FLD: 17.2 % — HIGH (ref 10.3–14.5)
WBC # BLD: 9.35 K/UL — SIGNIFICANT CHANGE UP (ref 3.8–10.5)
WBC # FLD AUTO: 9.35 K/UL — SIGNIFICANT CHANGE UP (ref 3.8–10.5)

## 2025-05-27 PROCEDURE — 99232 SBSQ HOSP IP/OBS MODERATE 35: CPT

## 2025-05-27 RX ORDER — HEPARIN SODIUM 1000 [USP'U]/ML
1000 INJECTION INTRAVENOUS; SUBCUTANEOUS
Qty: 25000 | Refills: 0 | Status: DISCONTINUED | OUTPATIENT
Start: 2025-05-27 | End: 2025-05-27

## 2025-05-27 RX ORDER — HEPARIN SODIUM 1000 [USP'U]/ML
1450 INJECTION INTRAVENOUS; SUBCUTANEOUS
Qty: 25000 | Refills: 0 | Status: DISCONTINUED | OUTPATIENT
Start: 2025-05-27 | End: 2025-05-28

## 2025-05-27 RX ADMIN — Medication 100 MILLILITER(S): at 11:26

## 2025-05-27 RX ADMIN — Medication 81 MILLIGRAM(S): at 11:31

## 2025-05-27 RX ADMIN — FUROSEMIDE 40 MILLIGRAM(S): 10 INJECTION INTRAMUSCULAR; INTRAVENOUS at 05:37

## 2025-05-27 RX ADMIN — METOPROLOL SUCCINATE 50 MILLIGRAM(S): 50 TABLET, EXTENDED RELEASE ORAL at 05:36

## 2025-05-27 RX ADMIN — HEPARIN SODIUM 1450 UNIT(S)/HR: 1000 INJECTION INTRAVENOUS; SUBCUTANEOUS at 07:11

## 2025-05-27 RX ADMIN — Medication 1 DOSE(S): at 08:39

## 2025-05-27 RX ADMIN — HEPARIN SODIUM 14.5 UNIT(S)/HR: 1000 INJECTION INTRAVENOUS; SUBCUTANEOUS at 22:09

## 2025-05-27 RX ADMIN — Medication 1 DOSE(S): at 20:23

## 2025-05-27 RX ADMIN — ATORVASTATIN CALCIUM 80 MILLIGRAM(S): 80 TABLET, FILM COATED ORAL at 21:49

## 2025-05-27 RX ADMIN — METOPROLOL SUCCINATE 50 MILLIGRAM(S): 50 TABLET, EXTENDED RELEASE ORAL at 18:30

## 2025-05-27 RX ADMIN — Medication 3 MILLIGRAM(S): at 21:49

## 2025-05-27 NOTE — PROGRESS NOTE ADULT - SUBJECTIVE AND OBJECTIVE BOX
[facility] Division of Hospital Medicine    SUBJECTIVE / OVERNIGHT EVENTS:  No events    Patient denies chest pain, SOB, abd pain, N/V, fever, chills, dysuria or any other complaints. All remainder ROS negative.     MEDICATIONS  (STANDING):  aspirin  chewable 81 milliGRAM(s) Oral daily  atorvastatin 80 milliGRAM(s) Oral at bedtime  fluticasone propionate/ salmeterol 250-50 MICROgram(s) Diskus 1 Dose(s) Inhalation two times a day  furosemide    Tablet 40 milliGRAM(s) Oral daily  heparin  Infusion.  Unit(s)/Hr (10 mL/Hr) IV Continuous <Continuous>  metoprolol tartrate 50 milliGRAM(s) Oral two times a day    MEDICATIONS  (PRN):  acetaminophen     Tablet .. 650 milliGRAM(s) Oral every 6 hours PRN Temp greater or equal to 38C (100.4F), Mild Pain (1 - 3)  albuterol/ipratropium for Nebulization 3 milliLiter(s) Nebulizer every 6 hours PRN Shortness of Breath and/or Wheezing  aluminum hydroxide/magnesium hydroxide/simethicone Suspension 30 milliLiter(s) Oral every 4 hours PRN Dyspepsia  heparin   Injectable 5300 Unit(s) IV Push every 6 hours PRN For aPTT less than 40  melatonin 3 milliGRAM(s) Oral at bedtime PRN Insomnia  ondansetron Injectable 4 milliGRAM(s) IV Push every 8 hours PRN Nausea and/or Vomiting        I&O's Summary    26 May 2025 07:01  -  27 May 2025 07:00  --------------------------------------------------------  IN: 1120 mL / OUT: 0 mL / NET: 1120 mL        PHYSICAL EXAM:  Vital Signs Last 24 Hrs  T(C): 36.7 (27 May 2025 07:48), Max: 36.7 (27 May 2025 07:48)  T(F): 98.1 (27 May 2025 07:48), Max: 98.1 (27 May 2025 07:48)  HR: 60 (27 May 2025 11:57) (55 - 77)  BP: 132/95 (27 May 2025 11:57) (104/56 - 161/69)  BP(mean): 89 (26 May 2025 16:15) (89 - 89)  RR: 13 (27 May 2025 11:57) (13 - 18)  SpO2: 99% (27 May 2025 11:57) (92% - 99%)    Parameters below as of 27 May 2025 11:57  Patient On (Oxygen Delivery Method): room air            CONSTITUTIONAL: NAD, appears stated age  ENMT: Moist oral mucosa, no pharyngeal injection or exudates; normal dentition  RESPIRATORY: Normal respiratory effort; clear to auscultation bilaterally  CARDIOVASCULAR: Regular rate and rhythm, normal S1 and S2, no murmur/rub/gallop; Peripheral pulses are 2+ bilaterally  ABDOMEN: Nontender to palpation, normoactive bowel sounds, no rebound/guarding;   MUSCLOSKELETAL:  No clubbing or cyanosis of digits; no joint swelling or tenderness to palpation  PSYCH: A+O to person, place, and time; affect appropriate  NEUROLOGY: CN 2-12 are intact and symmetric; no gross sensory deficits;   SKIN: No rashes; no palpable lesions    LABS:                        9.6    9.35  )-----------( 255      ( 27 May 2025 05:25 )             32.4     05-26    141  |  107  |  29.9[H]  ----------------------------<  80  3.9   |  20.0[L]  |  1.04    Ca    8.9      26 May 2025 05:30  Mg     2.4     05-26      PTT - ( 27 May 2025 05:25 )  PTT:58.6 sec      Urinalysis Basic - ( 26 May 2025 05:30 )    Color: x / Appearance: x / SG: x / pH: x  Gluc: 80 mg/dL / Ketone: x  / Bili: x / Urobili: x   Blood: x / Protein: x / Nitrite: x   Leuk Esterase: x / RBC: x / WBC x   Sq Epi: x / Non Sq Epi: x / Bacteria: x        CAPILLARY BLOOD GLUCOSE            RADIOLOGY & ADDITIONAL TESTS:  Results Reviewed:   Imaging Personally Reviewed:  Electrocardiogram Personally Reviewed:

## 2025-05-27 NOTE — CONSULT NOTE ADULT - SUBJECTIVE AND OBJECTIVE BOX
Narrative: 65 y/o M w/ PMH of COPD (not on home O2), active smoking (2ppd for min of 40pck years), HTN, HLD, CAD s/p PCI, AAA s/p repair, remote hx of etoh use disorder (last drink 2010), HFmrEF (EF 45-50% 2016), psoriasis, PVD s/p RLE bypass (follows w/ vascular) presented to ED overnight c/o substernal chest pressure w/ associated Rt arm pain. In ED pt initially placed on 6L NC but no reported hypoxia. Now satting well on room air.  Clinically near euvolemic at this time and not bronchospastic on exam. Initial w/u significant for Hb 8.8, BNP >2K, trops 105-->368, Ddimer 587, EKG w/ no acute ischemic changes initially but repeat EKG now w/ ST depressions in I, II and V5. 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 The thoracic aorta measures 3.6 cm in diameter in this location. Significant stenosis of the proximal SMA suspected. Partially imaged 4.2 cm abdominal aortic aneurysm with peripheral mural thrombus. Patient presents to cath lab as ACS/NSTEMI with plan for R/LHC w/ coronary angiography +/- PCI.       Review of Systems: negative unless mentioned in HPI    Symptoms:        Angina (Class): ACS/ NSTEMI        Ischemic Symptoms: Angina     Heart Failure:        Systolic/Diastolic/Combined: Systolic        NYHA Class (within 2 weeks): III    Assessment of LVEF (Must be within 6 months):       EF: 40-45%       Assessed by: TTE       Date: 5/24/25    Prior Cardiac Interventions (LHC, stents, CABG):       PCI's (Date, Stents, Vessels): 2016 w/ PCI BMS prox LAD, residual prox RCA 80%, 60% Ramus          EKG:  < from: 12 Lead ECG (05.25.25 @ 09:19) >  Diagnosis Line Sinus bradycardia with sinus arrhythmia  Possible Left atrial enlargement  Left axis deviation  Left ventricular hypertrophy  Septal infarct , age undetermined  Marked T wave abnormality, consider anterolateral ischemia  Prolonged QT  Abnormal ECG    < end of copied text >    Echo (Date, Findings):   < from: TTE W or WO Ultrasound Enhancing Agent (05.24.25 @ 15:23) >  CONCLUSIONS:      1. Left ventricular cavity is normal in size. Left ventricular wall thickness is normal. Left ventricular systolic function is mildly decreased with an ejection fraction visually estimated at 40 to 45 %. Global left ventricular hypokinesis.   2. There is mild (grade 1) left ventricular diastolic dysfunction, with normal left ventricular filling pressure.   3. Normal right ventricular cavity size, with normal wall thickness, and normal right ventricular systolic function.   4. Moderate to severe mitral regurgitation.   5. No pericardial effusion seen.   6. No prior echocardiogram is available for comparison.   7. Pulmonary artery systolic pressure could not be estimated.   8. Recommendations: Consider SANFORD for evaluation of Mitral regurgitation.    < end of copied text >      Antianginal Therapies: ACS/ NSTEMI        Beta Blockers:         Calcium Channel Blockers:        Long Acting Nitrates:        Ranexa:     Associated Risk Factors:        Frailty Score: (N/A, mild, moderate, severe) Moderate        Cerebrovascular Disease: N/A       Chronic Lung Disease: Yes       Peripheral Arterial Disease: Yes       Chronic Kidney Disease (if yes, what is GFR): N/A       Uncontrolled Diabetes (if yes, what is HgbA1C or FBS): N/A       Poorly Controlled Hypertension (if yes, what is SBP): N/A       Morbid Obesity (if yes, what is BMI): N/A       History of Recent Ventricular Arrhythmia: N/A       Inability to Ambulate Safely: N/A       Need for Therapeutic Anticoagulation: N/A       Antiplatelet or Contrast Allergy: N/A    VITAL SIGNS:ICU Vital Signs Last 24 Hrs  T(C): 36.7 (27 May 2025 07:48), Max: 36.7 (27 May 2025 07:48)  T(F): 98.1 (27 May 2025 07:48), Max: 98.1 (27 May 2025 07:48)  HR: 67 (27 May 2025 08:39) (55 - 77)  BP: 161/69 (27 May 2025 07:48) (104/56 - 161/69)  BP(mean): 89 (26 May 2025 16:15) (89 - 89)  ABP: --  ABP(mean): --  RR: 18 (27 May 2025 07:48) (16 - 18)  SpO2: 95% (27 May 2025 08:39) (92% - 99%)    O2 Parameters below as of 27 May 2025 08:39  Patient On (Oxygen Delivery Method): room air      PHYSICAL EXAM:  Constitutional: A & O x 3, NAD  HEENT:  Normal oral mucosa, PERRL, EOMI	  Cardiovascular: S1 S2, + murmur, No JVD  Respiratory: Lungs clear to auscultation	  Gastrointestinal:  Soft, Non-tender, + BS	  Skin: No rashes or cyanosis  Neurologic: No deficit appreciated  Extremities: Normal range of motion, no edema  Vascular: distal pulses doppler     LABS:                        9.6    9.35  )-----------( 255      ( 27 May 2025 05:25 )             32.4     05-26    141  |  107  |  29.9[H]  ----------------------------<  80  3.9   |  20.0[L]  |  1.04    Ca    8.9      26 May 2025 05:30  Mg     2.4     05-26      PTT - ( 27 May 2025 05:25 )  PTT:58.6 sec

## 2025-05-27 NOTE — CHART NOTE - NSCHARTNOTEFT_GEN_A_CORE
Contacted by RN due to patient bleeding after scratching R ankle. Patient is a 63yo M with PMHx of acute hypoxic respiratory failure 2/2 acute HFrEF, NSTEMI on patient specific heparin gtt, PVD s/p RLE bypass, incidental CTA chest finding of saccular aneurysm vs penetrating ulcer, nicotine dependence. Interventional cardio following, plan for SANFORD tomorrow and NPO after MD, [facility] Following.     Patient seen and examined at bedside with RN. Right medial ankle with bleeding s/p small scratch below the medial malleolus. RN holding pressure, actively bleeding.  Patient reports he bleeds easily. Moderate amount of blood pool on floor next to bed. Patient reports he feels fine overall, denies dizziness or any other symptoms at this time.     VS: /74    R ankle cleaned and pressure dressing applied with Surgicel and gauze to obtain hemostasis. R foot elevated on pillow. Good pedal pulse in R ankle, no other noted wounds on R foot.     -CBC, T&S  -Continue pressure dressing, monitor  RN to call with questions or concerns

## 2025-05-27 NOTE — CONSULT NOTE ADULT - ASSESSMENT
64 year old male with a PMHx of COPD (not on home O2), active smoking (2ppd for min of 40pck years), HTN, HLD, CAD s/p PCI, AAA s/p repair, remote hx of etoh use disorder (last drink 2010), HFmrEF (EF 45-50% 2016), psoriasis, PVD s/p RLE bypass (follows w/ vascular, who presented to the ED with complaints of substernal chest pressure w/ associated right arm pain. In ED EKG without any acute ST segment changes, now with TWI in leads 1, aVL, V4-V6 along with ST depressions in V5-V6, HS trop 113->368, TTE w/ LVEF 40-45%, mod-severe MR. Patient with ACS/ NSTEMI criteria. Plan for R/LHC w/ coronary angiography.     Risk Stratification:  ASA: 3  Mallampati:   Bleeding Risk: 2.0%  Creatinine: 1.04  GFR: 80  Pt assessed, appropriate for sedation, pt educated regarding the plan for Versed/Fentanyl as needed.    Plan/Recommendations:   -plan for R/LHC  -preferred access: RBV/RRA  -patient seen and examined  -confirmed appropriate NPO duration  -ECG and Labs reviewed  -Aspirin 81mg po pre-cath   -NS @ 100mL IV bolus pre-cath, reduced rate 2/2 HFrEF   -procedure discussed with patient; risks and benefits explained, questions answered  -consent obtained by attending IC    Risks, benefits, and alternatives reviewed.  Risks including but not limited to MI, death, stroke, bleeding, infection, vessel injury, hematoma, renal failure, allergic reaction, urgent open heart surgery, restenosis and stent thrombosis were reviewed.  All questions answered.  Patient is agreeable to proceed.   64 year old male with a PMHx of COPD (not on home O2), active smoking (2ppd for min of 40pck years), HTN, HLD, CAD s/p PCI, AAA s/p repair, remote hx of etoh use disorder (last drink 2010), HFmrEF (EF 45-50% 2016), psoriasis, PVD s/p RLE bypass (follows w/ vascular, who presented to the ED with complaints of substernal chest pressure w/ associated right arm pain. In ED EKG without any acute ST segment changes, now with TWI in leads 1, aVL, V4-V6 along with ST depressions in V5-V6, HS trop 113->368, TTE w/ LVEF 40-45%, mod-severe MR. Patient with ACS/ NSTEMI criteria. Plan for R/LHC w/ coronary angiography.     Risk Stratification:  ASA: 3  Mallampati: 3  Bleeding Risk: 2.0%  Creatinine: 1.04  GFR: 80  Pt assessed, appropriate for sedation, pt educated regarding the plan for Versed/Fentanyl as needed.    Plan/Recommendations:   -plan for R/LHC  -preferred access: RBV/RRA  -patient seen and examined  -confirmed appropriate NPO duration  -ECG and Labs reviewed  -Aspirin 81mg po pre-cath   -NS @ 100mL IV bolus pre-cath, reduced rate 2/2 HFrEF   -procedure discussed with patient; risks and benefits explained, questions answered  -consent obtained by attending IC    Risks, benefits, and alternatives reviewed.  Risks including but not limited to MI, death, stroke, bleeding, infection, vessel injury, hematoma, renal failure, allergic reaction, urgent open heart surgery, restenosis and stent thrombosis were reviewed.  All questions answered.  Patient is agreeable to proceed.

## 2025-05-27 NOTE — PROGRESS NOTE ADULT - SUBJECTIVE AND OBJECTIVE BOX
64y Male who had a R/LHC which showed severe ramus and borderlikne LAD disease, via RRA band in place. Wrist site benign. Patient awake and alert without complaints. Denies chest pain, sob, palps.    < from: Cardiac Catheterization (05.27.25 @ 12:19) >  Diagnostic Conclusions:     Severe ramus and borderline LAD disease.  Non dominant RCA disease.  MR  Recommendations:     SANFORD to eval MR   May need PCI of ramus and LAD in future     < end of copied text >      Neuro: A&OX3  Lungs: CTA B/L  CV: S1, S2, no murmur, RRR  Abd: Soft  Right Wrist no bleeding, no hematoma, no ecchymosis  Extremity: + distal pulses, cap refill <3 sec      A/P: 64y Male s/p R/LH no intervention  1. Wrist management discussed with patient  2. Continue current meds  3. Bedrest x 1 hour  4. REmove radial band and brachial sheath in 1 hour  5. NPO after midnight for SANFORD in am  6. May resume heparin in 4 hours post band removal

## 2025-05-27 NOTE — PROVIDER CONTACT NOTE (OTHER) - SITUATION
Patient being brought up from ED with a heparin gtt running at 10mL/hr. Most recent note from cardiology says to avoid heparin until aortic evaluation is complete.
pt was previously on Heparin drip

## 2025-05-27 NOTE — PROGRESS NOTE ADULT - ASSESSMENT
64M w/ PMH of COPD (not on home O2), active smoking (2ppd for min of 40pck years), HTN, HLD, CAD s/p PCI, AAA s/p repair, remote hx of etoh use disorder (last drink 2010), HFmrEF (EF 45-50% 2016), psoriasis, PVD s/p RLE bypass (follows w/ vascular) presented to ED overnight c/o substernal chest pressure w/ associated Rt arm pain.  In ED pt initially placed on 6L NC but no reported hypoxia.  Now satting well on room air.  Clinically near euvolemic at this time and not bronchospastic on exam. Initial w/u significant for Hb 8.8, BNP >2K, trops 105-->113, Ddimer 587, EKG w/ no acute ischemic changes initially but repeat EKG now w/ ST depressions in I, II and V5.   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 The thoracic aorta measures 3.6 cm in diameter in this location. Significant stenosis of the proximal SMA suspected. Partially imaged 4.2 cm abdominal aortic aneurysm with peripheral mural thrombus.  s/p 40mg IV lasix, 324mg ASA, 40mg po prednisone and 1L NS given in ED.  Cardio and CTSx consulted.  Will admit for acute decompensated HF and r/o ACS.      Acute hypoxic respiratory failure 2/2 acute HFrEF  lasix 40mg PO qd  SB cardio following  now on RA    NSTEMI  - EKGs 5/25 with persistent TWI in leads 1, aVL, V4-V6 and ST depressions V5-V6  - TTE w/ EF 40-45% and WMA  - c/w heparin drip  - planned for [facility] 5/27  - asa and lipitor 80mg  - lopressor 50mg q12  F/u cardio recs    PVD s/p RLE bypass  - Has chronic RLE pain, better with movement according to the patient  - pulses palpable and RLE warm  - s/p CT RLE w/ contrast on 5/22 ordered by vascular and result pending   - LE venous duplex negative for DVT  - f/u with vascular as outpatient    Incidental CTA chest finding   - Found to have saccular aneurysm versus penetrating ulcer of the proximal descending thoracic aorta near left subclavian artery takeoff, significant stenosis of the proximal SMA suspected and 4.2 cm abdominal aortic aneurysm with peripheral mural thrombus.  - CTSx consulted, rec appreciated, no acute surgical intervention needed at this time   - c/w high dose statin  - no acute surgical intervention needed at this time   - CTSx recommending repeat CT in 3 months and f/u w/ Dr. Barnes as outpatient    Nicotine dependence  - prn nicotine patch   - counseled on abstaining from smoking     VTE ppx:  heparin gtt  Dispo: Acute pending [facility]

## 2025-05-27 NOTE — PROGRESS NOTE ADULT - SUBJECTIVE AND OBJECTIVE BOX
[facility] - [facility], [facility]                                   [address] [place] [address]                                                      PHONE: [phone]                                                         FAX: [phone]  [email or web address]  ---------------------------------------------------------------------------------------------------------------------------------    Please see cath report.    Severe proximal LAD and ramus disease.    SANFORD emmy jama for MR          Ty Streeter MD    Office [phone]  Cell     [phone]

## 2025-05-28 ENCOUNTER — RESULT REVIEW (OUTPATIENT)
Age: 65
End: 2025-05-28

## 2025-05-28 LAB
ANION GAP SERPL CALC-SCNC: 14 MMOL/L — SIGNIFICANT CHANGE UP (ref 5–17)
APTT BLD: 44.3 SEC — HIGH (ref 26.1–36.8)
APTT BLD: 60.1 SEC — HIGH (ref 26.1–36.8)
APTT BLD: 73 SEC — HIGH (ref 26.1–36.8)
BASOPHILS # BLD AUTO: 0.07 K/UL — SIGNIFICANT CHANGE UP (ref 0–0.2)
BASOPHILS NFR BLD AUTO: 0.8 % — SIGNIFICANT CHANGE UP (ref 0–2)
BUN SERPL-MCNC: 33.4 MG/DL — HIGH (ref 8–20)
CALCIUM SERPL-MCNC: 9.1 MG/DL — SIGNIFICANT CHANGE UP (ref 8.4–10.5)
CHLORIDE SERPL-SCNC: 102 MMOL/L — SIGNIFICANT CHANGE UP (ref 96–108)
CO2 SERPL-SCNC: 20 MMOL/L — LOW (ref 22–29)
CREAT SERPL-MCNC: 1.24 MG/DL — SIGNIFICANT CHANGE UP (ref 0.5–1.3)
EGFR: 65 ML/MIN/1.73M2 — SIGNIFICANT CHANGE UP
EGFR: 65 ML/MIN/1.73M2 — SIGNIFICANT CHANGE UP
EOSINOPHIL # BLD AUTO: 0.28 K/UL — SIGNIFICANT CHANGE UP (ref 0–0.5)
EOSINOPHIL NFR BLD AUTO: 3.3 % — SIGNIFICANT CHANGE UP (ref 0–6)
GLUCOSE SERPL-MCNC: 105 MG/DL — HIGH (ref 70–99)
HCT VFR BLD CALC: 31.2 % — LOW (ref 39–50)
HCT VFR BLD CALC: 32.4 % — LOW (ref 39–50)
HGB BLD-MCNC: 9.4 G/DL — LOW (ref 13–17)
HGB BLD-MCNC: 9.5 G/DL — LOW (ref 13–17)
IMM GRANULOCYTES # BLD AUTO: 0.03 K/UL — SIGNIFICANT CHANGE UP (ref 0–0.07)
IMM GRANULOCYTES NFR BLD AUTO: 0.4 % — SIGNIFICANT CHANGE UP (ref 0–0.9)
LYMPHOCYTES # BLD AUTO: 1.58 K/UL — SIGNIFICANT CHANGE UP (ref 1–3.3)
LYMPHOCYTES NFR BLD AUTO: 18.4 % — SIGNIFICANT CHANGE UP (ref 13–44)
MAGNESIUM SERPL-MCNC: 2.4 MG/DL — SIGNIFICANT CHANGE UP (ref 1.6–2.6)
MCHC RBC-ENTMCNC: 23.1 PG — LOW (ref 27–34)
MCHC RBC-ENTMCNC: 23.6 PG — LOW (ref 27–34)
MCHC RBC-ENTMCNC: 29.3 G/DL — LOW (ref 32–36)
MCHC RBC-ENTMCNC: 30.1 G/DL — LOW (ref 32–36)
MCV RBC AUTO: 78.2 FL — LOW (ref 80–100)
MCV RBC AUTO: 78.6 FL — LOW (ref 80–100)
MONOCYTES # BLD AUTO: 0.85 K/UL — SIGNIFICANT CHANGE UP (ref 0–0.9)
MONOCYTES NFR BLD AUTO: 9.9 % — SIGNIFICANT CHANGE UP (ref 2–14)
NEUTROPHILS # BLD AUTO: 5.76 K/UL — SIGNIFICANT CHANGE UP (ref 1.8–7.4)
NEUTROPHILS NFR BLD AUTO: 67.2 % — SIGNIFICANT CHANGE UP (ref 43–77)
NRBC # BLD AUTO: 0 K/UL — SIGNIFICANT CHANGE UP (ref 0–0)
NRBC # BLD AUTO: 0 K/UL — SIGNIFICANT CHANGE UP (ref 0–0)
NRBC # FLD: 0 K/UL — SIGNIFICANT CHANGE UP (ref 0–0)
NRBC # FLD: 0 K/UL — SIGNIFICANT CHANGE UP (ref 0–0)
NRBC BLD AUTO-RTO: 0 /100 WBCS — SIGNIFICANT CHANGE UP (ref 0–0)
NRBC BLD AUTO-RTO: 0 /100 WBCS — SIGNIFICANT CHANGE UP (ref 0–0)
PHOSPHATE SERPL-MCNC: 3.8 MG/DL — SIGNIFICANT CHANGE UP (ref 2.4–4.7)
PLATELET # BLD AUTO: 265 K/UL — SIGNIFICANT CHANGE UP (ref 150–400)
PLATELET # BLD AUTO: 271 K/UL — SIGNIFICANT CHANGE UP (ref 150–400)
PMV BLD: 10.7 FL — SIGNIFICANT CHANGE UP (ref 7–13)
PMV BLD: 10.7 FL — SIGNIFICANT CHANGE UP (ref 7–13)
POTASSIUM SERPL-MCNC: 4 MMOL/L — SIGNIFICANT CHANGE UP (ref 3.5–5.3)
POTASSIUM SERPL-SCNC: 4 MMOL/L — SIGNIFICANT CHANGE UP (ref 3.5–5.3)
RBC # BLD: 3.99 M/UL — LOW (ref 4.2–5.8)
RBC # BLD: 4.12 M/UL — LOW (ref 4.2–5.8)
RBC # FLD: 16.9 % — HIGH (ref 10.3–14.5)
RBC # FLD: 17.2 % — HIGH (ref 10.3–14.5)
SODIUM SERPL-SCNC: 136 MMOL/L — SIGNIFICANT CHANGE UP (ref 135–145)
WBC # BLD: 8.57 K/UL — SIGNIFICANT CHANGE UP (ref 3.8–10.5)
WBC # BLD: 8.88 K/UL — SIGNIFICANT CHANGE UP (ref 3.8–10.5)
WBC # FLD AUTO: 8.57 K/UL — SIGNIFICANT CHANGE UP (ref 3.8–10.5)
WBC # FLD AUTO: 8.88 K/UL — SIGNIFICANT CHANGE UP (ref 3.8–10.5)

## 2025-05-28 PROCEDURE — 99232 SBSQ HOSP IP/OBS MODERATE 35: CPT

## 2025-05-28 RX ORDER — HEPARIN SODIUM 1000 [USP'U]/ML
1550 INJECTION INTRAVENOUS; SUBCUTANEOUS
Qty: 25000 | Refills: 0 | Status: DISCONTINUED | OUTPATIENT
Start: 2025-05-28 | End: 2025-05-29

## 2025-05-28 RX ADMIN — Medication 81 MILLIGRAM(S): at 11:07

## 2025-05-28 RX ADMIN — Medication 1 DOSE(S): at 20:42

## 2025-05-28 RX ADMIN — HEPARIN SODIUM 15.5 UNIT(S)/HR: 1000 INJECTION INTRAVENOUS; SUBCUTANEOUS at 19:13

## 2025-05-28 RX ADMIN — METOPROLOL SUCCINATE 50 MILLIGRAM(S): 50 TABLET, EXTENDED RELEASE ORAL at 17:09

## 2025-05-28 RX ADMIN — HEPARIN SODIUM 15.5 UNIT(S)/HR: 1000 INJECTION INTRAVENOUS; SUBCUTANEOUS at 22:02

## 2025-05-28 RX ADMIN — ATORVASTATIN CALCIUM 80 MILLIGRAM(S): 80 TABLET, FILM COATED ORAL at 21:27

## 2025-05-28 RX ADMIN — HEPARIN SODIUM 15.5 UNIT(S)/HR: 1000 INJECTION INTRAVENOUS; SUBCUTANEOUS at 07:00

## 2025-05-28 RX ADMIN — FUROSEMIDE 40 MILLIGRAM(S): 10 INJECTION INTRAMUSCULAR; INTRAVENOUS at 05:30

## 2025-05-28 RX ADMIN — METOPROLOL SUCCINATE 50 MILLIGRAM(S): 50 TABLET, EXTENDED RELEASE ORAL at 05:30

## 2025-05-28 RX ADMIN — HEPARIN SODIUM 15.5 UNIT(S)/HR: 1000 INJECTION INTRAVENOUS; SUBCUTANEOUS at 14:27

## 2025-05-28 RX ADMIN — HEPARIN SODIUM 15.5 UNIT(S)/HR: 1000 INJECTION INTRAVENOUS; SUBCUTANEOUS at 16:26

## 2025-05-28 NOTE — CHART NOTE - NSCHARTNOTEFT_GEN_A_CORE
Cardiology called. Patient with patient specific nomogram.   PTT 44.3 goal 53-73  increase heparin GTT to 1550 units/hour  recheck PTT in 6 hours  primary management per [facility]

## 2025-05-28 NOTE — PROGRESS NOTE ADULT - ASSESSMENT
63 y/o M w/ PMH of COPD (not on home O2), active smoking (2ppd for min of 40pck years), HTN, HLD, CAD s/p PCI, AAA s/p repair, remote hx of etoh use disorder (last drink 2010), HFmrEF (EF 45-50% 2016), psoriasis, PVD s/p RLE bypass (follows w/ vascular) presented to ED overnight c/o substernal chest pressure w/ associated Rt arm pain.  In ED pt initially placed on 6L NC but no reported hypoxia.  Now satting well on room air.  Clinically near euvolemic at this time and not bronchospastic on exam. Initial w/u significant for Hb 8.8, BNP >2K, trops 105-->113, Ddimer 587, EKG w/ no acute ischemic changes initially but repeat EKG now w/ ST depressions in I, II and V5.   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 The thoracic aorta measures 3.6 cm in diameter in this location. Significant stenosis of the proximal SMA suspected. Partially imaged 4.2 cm abdominal aortic aneurysm with peripheral mural thrombus.  s/p 40mg IV lasix, 324mg ASA, 40mg po prednisone and 1L NS given in ED.  Cardio and CTSx consulted.  Will admit for acute decompensated HF and r/o ACS.      Acute hypoxic respiratory failure 2/2 acute HFrEF  - now resolved, on RA, euvolemic today  - s/p IV lasix to 40mg PO qd  - Goal O2 sat 88-92% in light of underlying COPD  - [place] cardio following  - daily weights, I/O's and fluid restricted diet    NSTEMI  - EKGs 5/25 with persistent TWI in leads 1, aVL, V4-V6 and ST depressions V5-V6  - Ddimer 587, CTA w/o PE, venous duplex without DVT  - TTE w/ EF 40-45% and WMA  - [place] cardio following  - c/w heparin drip  - asa and lipitor 80mg  - lopressor 50mg q12  - lipid profile/a1c noted  -s/p L/RHC 5/27 with severe proximal LAD and ramus disease ; no intervention; may need PCI of ramus and LAD in future   -s/p SANFORD today 5/28: LVEF 35-40%, mod MR, trace TR, fibrocalcific aortic valve sclerosis without stenosis     Microcytic anemia   - Last blood work was from 2016 at which time pt noted to be anemic w/ baseline Hb 9-11  - hgb trend stable today  - labs show iron deficiency, s/p 1 dose IV iron  - B12 wnl  - monitor CBC and transfuse for Hb<8  - outpatient age appropriate malignancy work ups on d/c    PVD s/p RLE bypass  - Has chronic RLE pain, better with movement according to the patient  - pulses palpable and RLE warm  - s/p CT RLE w/ contrast on 5/22 ordered by vascular and result pending   - LE venous duplex negative for DVT  - f/u with vascular as outpatient    Incidental CTA chest finding   - Found to have saccular aneurysm versus penetrating ulcer of the proximal descending thoracic aorta near left subclavian artery takeoff, significant stenosis of the proximal SMA suspected and 4.2 cm abdominal aortic aneurysm with peripheral mural thrombus.  - CTSx consulted, rec appreciated, no acute surgical intervention needed at this time   - c/w high dose statin  - BP control  - no acute surgical intervention needed at this time   - CTSx recommending repeat CT in 3 months and f/u w/ Dr. Barnes as outpatient    HTN / HLD  -holding home lisinopril while getting IV lasix  -lopressor 50mg BID  - lipitor 80mg    Nicotine dependence  - prn nicotine patch   -cessation advised     VTE ppx:  heparin gtt  Dispo: anticipate home next 1-2 days

## 2025-05-28 NOTE — CHART NOTE - NSCHARTNOTEFT_GEN_A_CORE
for elective SANFORD, , which was performed without acute complication. The patient was observed per post procedure protocol and was sent back to room once all criteria were met. See official results.    < from: SANFORD W or WO Ultrasound Enhancing Agent (05.28.25 @ 08:14) >    CONCLUSIONS:      1. Left ventricular systolic function is moderately decreased with an ejection fraction visually estimated at 35to 40 %.   2. Moderate mitral regurgitation.   3. Pulmonic valve was not well visualized.   4. No evidence of a patent foramen ovale by color flow Doppler.   5. Fibrocalcific aortic valve sclerosis without stenosis.   6. Trace tricuspid regurgitation.    < end of copied text >

## 2025-05-28 NOTE — CHART NOTE - NSCHARTNOTEFT_GEN_A_CORE
64y Male who had a R/LHC via RRA, right wrist site benign. Patient awake and alert without complaints overnight. Denies chest pain, sob, palps. Now presents for SANFROD to assess mitral valve.    Neuro: A&OX3  Lungs: CTA B/L  CV: S1, S2, no murmur, RRR  Abd: Soft  Right Wrist no bleeding, no hematoma, no ecchymosis  Extremity: + distal pulses, cap refill <3 sec      A/P: 64y Male s/p R/LHC no intervention  1. Wrist management discussed with patient  2. Continue current meds  3. Keep NPO for SANFORD  4. Proceed with SANFORD as planned  5. Cardiologist to obtain consent

## 2025-05-28 NOTE — CHART NOTE - NSCHARTNOTEFT_GEN_A_CORE
Notified by RN, patient with aptt 73, on heparin gtt with patient specific nomogram. No new orders. Keep infusion at current rate. MD notified.

## 2025-05-28 NOTE — PROGRESS NOTE ADULT - SUBJECTIVE AND OBJECTIVE BOX
JULIO CESAR JONES    [id number]    64y      Male    CC: chf    INTERVAL HPI/OVERNIGHT EVENTS: Pt seen and examined. s/p SANFORD today     REVIEW OF SYSTEMS:    CONSTITUTIONAL: No fever, weight loss  RESPIRATORY: No cough, wheezing, hemoptysis  CARDIOVASCULAR: No chest pain, palpitations  GASTROINTESTINAL: No abdominal or epigastric pain. No nausea, vomiting  NEUROLOGICAL: No headaches    Vital Signs Last 24 Hrs  T(C): 36.4 (28 May 2025 12:05), Max: 36.7 (28 May 2025 00:25)  T(F): 97.6 (28 May 2025 12:05), Max: 98.1 (28 May 2025 00:25)  HR: 72 (28 May 2025 12:05) (57 - 83)  BP: 122/75 (28 May 2025 12:05) (104/67 - 171/74)  BP(mean): --  RR: 18 (28 May 2025 12:05) (16 - 18)  SpO2: 100% (28 May 2025 12:05) (94% - 100%)    Parameters below as of 28 May 2025 12:05  Patient On (Oxygen Delivery Method): room air        PHYSICAL EXAM:    GENERAL: NAD  CHEST/LUNG: Clear to auscultation bilaterally; respirations unlabored on RA  HEART: S1S2+, Regular rate and rhythm  ABDOMEN: Soft, Nontender, Nondistended; Bowel sounds present  SKIN: warm, dry   NEURO: AAOX3, grossly non-focal     LABS:                        9.5    8.57  )-----------( 271      ( 28 May 2025 05:24 )             32.4     05-28    136  |  102  |  33.4[H]  ----------------------------<  105[H]  4.0   |  20.0[L]  |  1.24    Ca    9.1      28 May 2025 05:24  Phos  3.8     05-28  Mg     2.4     05-28      PTT - ( 28 May 2025 13:46 )  PTT:73.0 sec  Urinalysis Basic - ( 28 May 2025 05:24 )    Color: x / Appearance: x / SG: x / pH: x  Gluc: 105 mg/dL / Ketone: x  / Bili: x / Urobili: x   Blood: x / Protein: x / Nitrite: x   Leuk Esterase: x / RBC: x / WBC x   Sq Epi: x / Non Sq Epi: x / Bacteria: x          MEDICATIONS  (STANDING):  aspirin  chewable 81 milliGRAM(s) Oral daily  atorvastatin 80 milliGRAM(s) Oral at bedtime  fluticasone propionate/ salmeterol 250-50 MICROgram(s) Diskus 1 Dose(s) Inhalation two times a day  furosemide    Tablet 40 milliGRAM(s) Oral daily  heparin  Infusion 1550 Unit(s)/Hr (15.5 mL/Hr) IV Continuous <Continuous>  metoprolol tartrate 50 milliGRAM(s) Oral two times a day    MEDICATIONS  (PRN):  acetaminophen     Tablet .. 650 milliGRAM(s) Oral every 6 hours PRN Temp greater or equal to 38C (100.4F), Mild Pain (1 - 3)  albuterol/ipratropium for Nebulization 3 milliLiter(s) Nebulizer every 6 hours PRN Shortness of Breath and/or Wheezing  aluminum hydroxide/magnesium hydroxide/simethicone Suspension 30 milliLiter(s) Oral every 4 hours PRN Dyspepsia  heparin   Injectable 5300 Unit(s) IV Push every 6 hours PRN For aPTT less than 40  melatonin 3 milliGRAM(s) Oral at bedtime PRN Insomnia  ondansetron Injectable 4 milliGRAM(s) IV Push every 8 hours PRN Nausea and/or Vomiting      RADIOLOGY & ADDITIONAL TESTS:

## 2025-05-29 ENCOUNTER — TRANSCRIPTION ENCOUNTER (OUTPATIENT)
Age: 65
End: 2025-05-29

## 2025-05-29 ENCOUNTER — RESULT REVIEW (OUTPATIENT)
Age: 65
End: 2025-05-29

## 2025-05-29 LAB
ANION GAP SERPL CALC-SCNC: 14 MMOL/L — SIGNIFICANT CHANGE UP (ref 5–17)
APTT BLD: 52.9 SEC — HIGH (ref 26.1–36.8)
APTT BLD: 74.9 SEC — HIGH (ref 26.1–36.8)
BASOPHILS # BLD AUTO: 0.06 K/UL — SIGNIFICANT CHANGE UP (ref 0–0.2)
BASOPHILS NFR BLD AUTO: 0.8 % — SIGNIFICANT CHANGE UP (ref 0–2)
BUN SERPL-MCNC: 30.3 MG/DL — HIGH (ref 8–20)
CALCIUM SERPL-MCNC: 8.9 MG/DL — SIGNIFICANT CHANGE UP (ref 8.4–10.5)
CHLORIDE SERPL-SCNC: 105 MMOL/L — SIGNIFICANT CHANGE UP (ref 96–108)
CO2 SERPL-SCNC: 21 MMOL/L — LOW (ref 22–29)
CREAT SERPL-MCNC: 1.09 MG/DL — SIGNIFICANT CHANGE UP (ref 0.5–1.3)
EGFR: 76 ML/MIN/1.73M2 — SIGNIFICANT CHANGE UP
EGFR: 76 ML/MIN/1.73M2 — SIGNIFICANT CHANGE UP
EOSINOPHIL # BLD AUTO: 0.32 K/UL — SIGNIFICANT CHANGE UP (ref 0–0.5)
EOSINOPHIL NFR BLD AUTO: 4.2 % — SIGNIFICANT CHANGE UP (ref 0–6)
GAS PNL BLDA: SIGNIFICANT CHANGE UP
GLUCOSE SERPL-MCNC: 103 MG/DL — HIGH (ref 70–99)
HCT VFR BLD CALC: 32.7 % — LOW (ref 39–50)
HGB BLD-MCNC: 9.9 G/DL — LOW (ref 13–17)
IMM GRANULOCYTES # BLD AUTO: 0.03 K/UL — SIGNIFICANT CHANGE UP (ref 0–0.07)
IMM GRANULOCYTES NFR BLD AUTO: 0.4 % — SIGNIFICANT CHANGE UP (ref 0–0.9)
LYMPHOCYTES # BLD AUTO: 1.49 K/UL — SIGNIFICANT CHANGE UP (ref 1–3.3)
LYMPHOCYTES NFR BLD AUTO: 19.7 % — SIGNIFICANT CHANGE UP (ref 13–44)
MAGNESIUM SERPL-MCNC: 2.2 MG/DL — SIGNIFICANT CHANGE UP (ref 1.6–2.6)
MCHC RBC-ENTMCNC: 23.7 PG — LOW (ref 27–34)
MCHC RBC-ENTMCNC: 30.3 G/DL — LOW (ref 32–36)
MCV RBC AUTO: 78.4 FL — LOW (ref 80–100)
MONOCYTES # BLD AUTO: 0.78 K/UL — SIGNIFICANT CHANGE UP (ref 0–0.9)
MONOCYTES NFR BLD AUTO: 10.3 % — SIGNIFICANT CHANGE UP (ref 2–14)
NEUTROPHILS # BLD AUTO: 4.89 K/UL — SIGNIFICANT CHANGE UP (ref 1.8–7.4)
NEUTROPHILS NFR BLD AUTO: 64.6 % — SIGNIFICANT CHANGE UP (ref 43–77)
NRBC # BLD AUTO: 0 K/UL — SIGNIFICANT CHANGE UP (ref 0–0)
NRBC # FLD: 0 K/UL — SIGNIFICANT CHANGE UP (ref 0–0)
NRBC BLD AUTO-RTO: 0 /100 WBCS — SIGNIFICANT CHANGE UP (ref 0–0)
PHOSPHATE SERPL-MCNC: 3.4 MG/DL — SIGNIFICANT CHANGE UP (ref 2.4–4.7)
PLATELET # BLD AUTO: 230 K/UL — SIGNIFICANT CHANGE UP (ref 150–400)
PMV BLD: 9.7 FL — SIGNIFICANT CHANGE UP (ref 7–13)
POTASSIUM SERPL-MCNC: 3.7 MMOL/L — SIGNIFICANT CHANGE UP (ref 3.5–5.3)
POTASSIUM SERPL-SCNC: 3.7 MMOL/L — SIGNIFICANT CHANGE UP (ref 3.5–5.3)
RBC # BLD: 4.17 M/UL — LOW (ref 4.2–5.8)
RBC # FLD: 17.7 % — HIGH (ref 10.3–14.5)
SODIUM SERPL-SCNC: 140 MMOL/L — SIGNIFICANT CHANGE UP (ref 135–145)
WBC # BLD: 7.57 K/UL — SIGNIFICANT CHANGE UP (ref 3.8–10.5)
WBC # FLD AUTO: 7.57 K/UL — SIGNIFICANT CHANGE UP (ref 3.8–10.5)

## 2025-05-29 PROCEDURE — 88304 TISSUE EXAM BY PATHOLOGIST: CPT | Mod: 26

## 2025-05-29 PROCEDURE — 88311 DECALCIFY TISSUE: CPT | Mod: 26

## 2025-05-29 PROCEDURE — 99233 SBSQ HOSP IP/OBS HIGH 50: CPT

## 2025-05-29 PROCEDURE — 35661 BPG FEMORAL-FEMORAL: CPT | Mod: XU,RT

## 2025-05-29 PROCEDURE — 35876 REMOVAL OF CLOT IN GRAFT: CPT

## 2025-05-29 PROCEDURE — 93010 ELECTROCARDIOGRAM REPORT: CPT

## 2025-05-29 PROCEDURE — 75635 CT ANGIO ABDOMINAL ARTERIES: CPT | Mod: 26

## 2025-05-29 PROCEDURE — 35141 REPAIR DEFECT OF ARTERY: CPT | Mod: RT

## 2025-05-29 DEVICE — DEVICE CLOSURE 5F MYNX GRIP MUST ORDER MIN OF 10 EA: Type: IMPLANTABLE DEVICE | Site: RIGHT | Status: FUNCTIONAL

## 2025-05-29 DEVICE — INTRO MICROPUNC MPIS 5FRX10CM ECHO NDL: Type: IMPLANTABLE DEVICE | Site: RIGHT | Status: FUNCTIONAL

## 2025-05-29 DEVICE — CATH FOGARTY 3FR X 80CM: Type: IMPLANTABLE DEVICE | Site: RIGHT | Status: FUNCTIONAL

## 2025-05-29 DEVICE — GWIRE VASC ENTRY MINISTICK MAX 5FR 10CM: Type: IMPLANTABLE DEVICE | Site: RIGHT | Status: FUNCTIONAL

## 2025-05-29 DEVICE — SURGIFOAM 8 X 12.5CM X 10MM (100): Type: IMPLANTABLE DEVICE | Site: RIGHT | Status: FUNCTIONAL

## 2025-05-29 DEVICE — ANGIO CATH GLIDECATH ANGLE TAPER 5FR X 65CM: Type: IMPLANTABLE DEVICE | Site: RIGHT | Status: FUNCTIONAL

## 2025-05-29 DEVICE — IMPLANTABLE DEVICE: Type: IMPLANTABLE DEVICE | Site: RIGHT | Status: FUNCTIONAL

## 2025-05-29 DEVICE — CATH OMNI FLSH 0.035IN 5FRX65: Type: IMPLANTABLE DEVICE | Site: RIGHT | Status: FUNCTIONAL

## 2025-05-29 DEVICE — CLIP APPLIER ETHICON LIGACLIP 11.5" MEDIUM: Type: IMPLANTABLE DEVICE | Site: RIGHT | Status: FUNCTIONAL

## 2025-05-29 DEVICE — GUIDEWIRE BENTSON 0.035 X 180CM: Type: IMPLANTABLE DEVICE | Site: RIGHT | Status: FUNCTIONAL

## 2025-05-29 DEVICE — DEVICE CLOSURE 6F/7F MYNX GRIP MUST ORDER MIN OF 10 EA: Type: IMPLANTABLE DEVICE | Site: RIGHT | Status: FUNCTIONAL

## 2025-05-29 DEVICE — SURGIFLO MATRIX WITH THROMBIN KIT: Type: IMPLANTABLE DEVICE | Site: RIGHT | Status: FUNCTIONAL

## 2025-05-29 RX ORDER — CLOPIDOGREL BISULFATE 75 MG/1
300 TABLET, FILM COATED ORAL ONCE
Refills: 0 | Status: COMPLETED | OUTPATIENT
Start: 2025-05-29 | End: 2025-05-29

## 2025-05-29 RX ORDER — HEPARIN SODIUM 1000 [USP'U]/ML
INJECTION INTRAVENOUS; SUBCUTANEOUS
Qty: 25000 | Refills: 0 | Status: DISCONTINUED | OUTPATIENT
Start: 2025-05-29 | End: 2025-05-29

## 2025-05-29 RX ORDER — METOPROLOL SUCCINATE 50 MG/1
100 TABLET, EXTENDED RELEASE ORAL DAILY
Refills: 0 | Status: DISCONTINUED | OUTPATIENT
Start: 2025-05-29 | End: 2025-06-03

## 2025-05-29 RX ORDER — HEPARIN SODIUM 1000 [USP'U]/ML
1450 INJECTION INTRAVENOUS; SUBCUTANEOUS
Qty: 25000 | Refills: 0 | Status: DISCONTINUED | OUTPATIENT
Start: 2025-05-29 | End: 2025-05-29

## 2025-05-29 RX ORDER — HYDROMORPHONE/SOD CHLOR,ISO/PF 2 MG/10 ML
0.5 SYRINGE (ML) INJECTION ONCE
Refills: 0 | Status: DISCONTINUED | OUTPATIENT
Start: 2025-05-29 | End: 2025-05-29

## 2025-05-29 RX ORDER — HEPARIN SODIUM 1000 [USP'U]/ML
INJECTION INTRAVENOUS; SUBCUTANEOUS
Qty: 25000 | Refills: 0 | Status: DISCONTINUED | OUTPATIENT
Start: 2025-05-29 | End: 2025-05-30

## 2025-05-29 RX ORDER — FENTANYL CITRATE-0.9 % NACL/PF 100MCG/2ML
50 SYRINGE (ML) INTRAVENOUS ONCE
Refills: 0 | Status: DISCONTINUED | OUTPATIENT
Start: 2025-05-29 | End: 2025-05-29

## 2025-05-29 RX ORDER — HEPARIN SODIUM 1000 [USP'U]/ML
7000 INJECTION INTRAVENOUS; SUBCUTANEOUS EVERY 6 HOURS
Refills: 0 | Status: DISCONTINUED | OUTPATIENT
Start: 2025-05-29 | End: 2025-05-29

## 2025-05-29 RX ORDER — ACETAMINOPHEN 500 MG/5ML
1000 LIQUID (ML) ORAL ONCE
Refills: 0 | Status: COMPLETED | OUTPATIENT
Start: 2025-05-29 | End: 2025-05-29

## 2025-05-29 RX ORDER — KETOROLAC TROMETHAMINE 30 MG/ML
30 INJECTION, SOLUTION INTRAMUSCULAR; INTRAVENOUS ONCE
Refills: 0 | Status: DISCONTINUED | OUTPATIENT
Start: 2025-05-29 | End: 2025-05-29

## 2025-05-29 RX ORDER — HEPARIN SODIUM 1000 [USP'U]/ML
3500 INJECTION INTRAVENOUS; SUBCUTANEOUS EVERY 6 HOURS
Refills: 0 | Status: DISCONTINUED | OUTPATIENT
Start: 2025-05-29 | End: 2025-05-29

## 2025-05-29 RX ORDER — HEPARIN SODIUM 1000 [USP'U]/ML
3500 INJECTION INTRAVENOUS; SUBCUTANEOUS EVERY 6 HOURS
Refills: 0 | Status: DISCONTINUED | OUTPATIENT
Start: 2025-05-29 | End: 2025-05-30

## 2025-05-29 RX ORDER — HEPARIN SODIUM 1000 [USP'U]/ML
7000 INJECTION INTRAVENOUS; SUBCUTANEOUS EVERY 6 HOURS
Refills: 0 | Status: DISCONTINUED | OUTPATIENT
Start: 2025-05-29 | End: 2025-05-30

## 2025-05-29 RX ORDER — HEPARIN SODIUM 1000 [USP'U]/ML
7000 INJECTION INTRAVENOUS; SUBCUTANEOUS ONCE
Refills: 0 | Status: DISCONTINUED | OUTPATIENT
Start: 2025-05-29 | End: 2025-05-29

## 2025-05-29 RX ORDER — SACUBITRIL AND VALSARTAN 49; 51 MG/1; MG/1
1 TABLET, FILM COATED ORAL
Refills: 0 | Status: DISCONTINUED | OUTPATIENT
Start: 2025-05-29 | End: 2025-05-30

## 2025-05-29 RX ORDER — HEPARIN SODIUM 1000 [USP'U]/ML
7000 INJECTION INTRAVENOUS; SUBCUTANEOUS ONCE
Refills: 0 | Status: COMPLETED | OUTPATIENT
Start: 2025-05-29 | End: 2025-05-29

## 2025-05-29 RX ORDER — ISOSORBIDE MONONITRATE 60 MG/1
30 TABLET, EXTENDED RELEASE ORAL DAILY
Refills: 0 | Status: DISCONTINUED | OUTPATIENT
Start: 2025-05-29 | End: 2025-05-30

## 2025-05-29 RX ORDER — CLOPIDOGREL BISULFATE 75 MG/1
75 TABLET, FILM COATED ORAL DAILY
Refills: 0 | Status: DISCONTINUED | OUTPATIENT
Start: 2025-05-30 | End: 2025-06-03

## 2025-05-29 RX ORDER — SPIRONOLACTONE 25 MG
25 TABLET ORAL DAILY
Refills: 0 | Status: DISCONTINUED | OUTPATIENT
Start: 2025-05-29 | End: 2025-05-30

## 2025-05-29 RX ADMIN — HEPARIN SODIUM 14.5 UNIT(S)/HR: 1000 INJECTION INTRAVENOUS; SUBCUTANEOUS at 11:53

## 2025-05-29 RX ADMIN — KETOROLAC TROMETHAMINE 30 MILLIGRAM(S): 30 INJECTION, SOLUTION INTRAMUSCULAR; INTRAVENOUS at 18:27

## 2025-05-29 RX ADMIN — HEPARIN SODIUM 1600 UNIT(S)/HR: 1000 INJECTION INTRAVENOUS; SUBCUTANEOUS at 19:55

## 2025-05-29 RX ADMIN — Medication 50 MICROGRAM(S): at 17:54

## 2025-05-29 RX ADMIN — Medication 40 MILLIEQUIVALENT(S): at 05:45

## 2025-05-29 RX ADMIN — Medication 25 MILLIGRAM(S): at 12:20

## 2025-05-29 RX ADMIN — FUROSEMIDE 40 MILLIGRAM(S): 10 INJECTION INTRAMUSCULAR; INTRAVENOUS at 05:40

## 2025-05-29 RX ADMIN — HEPARIN SODIUM 14.5 UNIT(S)/HR: 1000 INJECTION INTRAVENOUS; SUBCUTANEOUS at 07:14

## 2025-05-29 RX ADMIN — KETOROLAC TROMETHAMINE 30 MILLIGRAM(S): 30 INJECTION, SOLUTION INTRAMUSCULAR; INTRAVENOUS at 18:55

## 2025-05-29 RX ADMIN — METOPROLOL SUCCINATE 100 MILLIGRAM(S): 50 TABLET, EXTENDED RELEASE ORAL at 12:20

## 2025-05-29 RX ADMIN — Medication 400 MILLIGRAM(S): at 17:44

## 2025-05-29 RX ADMIN — Medication 0.5 MILLIGRAM(S): at 20:06

## 2025-05-29 RX ADMIN — HEPARIN SODIUM 7000 UNIT(S): 1000 INJECTION INTRAVENOUS; SUBCUTANEOUS at 20:03

## 2025-05-29 RX ADMIN — Medication 1000 MILLIGRAM(S): at 17:54

## 2025-05-29 RX ADMIN — HEPARIN SODIUM 1000 UNIT(S)/HR: 1000 INJECTION INTRAVENOUS; SUBCUTANEOUS at 14:59

## 2025-05-29 RX ADMIN — Medication 81 MILLIGRAM(S): at 12:20

## 2025-05-29 RX ADMIN — ISOSORBIDE MONONITRATE 30 MILLIGRAM(S): 60 TABLET, EXTENDED RELEASE ORAL at 18:26

## 2025-05-29 RX ADMIN — Medication 1 MILLIGRAM(S): at 19:42

## 2025-05-29 RX ADMIN — SACUBITRIL AND VALSARTAN 1 TABLET(S): 49; 51 TABLET, FILM COATED ORAL at 18:26

## 2025-05-29 RX ADMIN — Medication 50 MICROGRAM(S): at 17:44

## 2025-05-29 RX ADMIN — METOPROLOL SUCCINATE 50 MILLIGRAM(S): 50 TABLET, EXTENDED RELEASE ORAL at 05:40

## 2025-05-29 NOTE — CHART NOTE - NSCHARTNOTEFT_GEN_A_CORE
Notified by RN monitor tech reporting TWI & ST depressions on tele. Pt admitted for decompensated HF and NSTEMI s/p L/RHC 5/27 & SANFORD 5/28, noted to have persistent TWI & ST depressions. Pt asx, VSS, NAD. EKG no acute changes from prior. AM labs WNL. F/u cardio recs. RN to notify for any acute changes. Notified by RN monitor tech reporting TWI & ST depressions on tele. Pt admitted for decompensated HF and NSTEMI s/p L/RHC 5/27 & SANFORD 5/28, noted to have persistent TWI & ST depressions. Pt asx, VSS, NAD. EKG no acute changes from prior. K 3.7 - supplemented, remainder of AM labs WNL. F/u cardio recs. RN to notify for any acute changes.

## 2025-05-29 NOTE — PROGRESS NOTE ADULT - TIME BILLING
chart review, patient evaluation, documentation, coordination of care and discussion with nurses and consultants.
chart review, patient evaluation, documentation, coordination of care and discussion with nurses, consultants, ACP and patient's brother.
chart review, patient evaluation, documentation, coordination of care and discussion with nurses, consultants, ACP, social work, case management and patient's family.

## 2025-05-29 NOTE — CONSULT NOTE ADULT - ASSESSMENT
Assessment: patient is a medically and vascular surgically complex male with ruitherford 2b ischemia after a cardiac cath    Plan:  -Emergent OR for RLE angiogram  -CTA if able to prior to OR given complex hx  -Heparin gtt with bolus   -Remainder of care per primary    Discussed with attending surgeon Dr. Yoo

## 2025-05-29 NOTE — PROVIDER CONTACT NOTE (OTHER) - NAME OF MD/NP/PA/DO NOTIFIED:
Med PA Elida
Cardiology provider Gladys
Med PA Elida
Vascular MD Lemus
Rahul Bryant
Maria Elena Isaacs from Cardiology
GELY Steel
Maria Elena Isaacs from Cardiology
Provider Annette
Rahul Bryant

## 2025-05-29 NOTE — PROVIDER CONTACT NOTE (OTHER) - ASSESSMENT
bp 1`72/83
Patient returned from cath lab at this time, upon assessment, patient with elevated blood pressure of 192/76. Patient complaining of pain and leg cramps. Patients feet bilateral cold to the touch with a pulse change from earlier in the day in the right dorsalis pedis to barley palpable. Feet and legs skin look pale and dusky. Vascular team at bedside completing own assessment.
pt asymptomatic no n/s of distress/pain
pt asymptomatic. no s/s of bleeding or hematoma.
Pulse in R-Dorsalis Pedis barely palpable. Skin dusky &pale, pt reports excruciating pain in R-leg.
pt is asymptomatic with no s/s of bleeding or discomfort
ptt 44.3

## 2025-05-29 NOTE — PROVIDER CONTACT NOTE (OTHER) - REASON
ECG performed by Rn results shared with Med PA and labs collected by CNA
Elevated BP, leg cramps, pulse change.
Pt returned from [facility] with weak pulse & discoloration in R- leg
pt needs new order for Heparin drip
pts New aPtt resulted at 60.1
pts aptt resulted at 74.9
ptt result for patient specific heparin drip
Pt on heparin gtt despite Cardio note saying to avoid heparin
Pt scratched themselves which prompted patient to bleed profusely
pts cardiac monitor showed Slight st segment depression with inverted T-waves

## 2025-05-29 NOTE — DISCHARGE NOTE PROVIDER - NSDCCPCAREPLAN_GEN_ALL_CORE_FT
PRINCIPAL DISCHARGE DIAGNOSIS  Diagnosis: CAD (coronary artery disease)  Assessment and Plan of Treatment: GDMT     PRINCIPAL DISCHARGE DIAGNOSIS  Diagnosis: Acute lower limb ischemia  Assessment and Plan of Treatment: [place] TYPE 2B S/P cardiac catherization via right groin access.

## 2025-05-29 NOTE — CHART NOTE - NSCHARTNOTEFT_GEN_A_CORE
Cardiology called, patient with patient specific nomogram.   PTT 74.9 goal 53-73. Decrease heparin gtt to 1450 units/hour and recheck PTT in 6 hours.  Primary management per [facility].

## 2025-05-29 NOTE — DISCHARGE NOTE PROVIDER - PROVIDER TOKENS
"Subjective   Elisabet Avila is a 74 y.o. female who presents for Medicare Annual Wellness Visit Subsequent.  HPI  Generally healthy, no major medical issues.     She has been having changes in her leg veins recently, noticing some increased swelling and leg fatigue.     PMH/PSH:  -Bunionectomy with Dr. Yang  -Osteoporosis  -Biceps tear     Providers:  GI-Dr. Epi Duran  Derm-Dr. Bertha Murguia  Optho -Dr. Fletcher, [facility]  Ortho -Dr. Maurer, Dr. Yang, Dr. Gant     , May 2013. Stays in [facility] between October and May.  4 kids, living in [place] and [place]  Exercises regularly-speed walking, elliptical, strength bands, gym every other day  Tries to keep up with dietary calcium, not interested in treatment for osteoporosis   Objective   /64   Pulse 65   Temp 36.3 °C (97.3 °F)   Ht 1.6 m (5' 3\")   Wt 50.3 kg (111 lb)   SpO2 97%   BMI 19.66 kg/m²    Physical Exam  Constitutional:       General: She is not in acute distress.  HENT:      Head: Normocephalic and atraumatic.      Mouth/Throat:      Mouth: Mucous membranes are moist.      Pharynx: Oropharynx is clear.   Eyes:      Pupils: Pupils are equal, round, and reactive to light.   Cardiovascular:      Rate and Rhythm: Normal rate and regular rhythm.      Pulses: Normal pulses.      Heart sounds: Normal heart sounds.   Pulmonary:      Effort: Pulmonary effort is normal.      Breath sounds: Normal breath sounds.   Abdominal:      General: Bowel sounds are normal.      Palpations: Abdomen is soft.      Tenderness: There is no abdominal tenderness.   Musculoskeletal:         General: Normal range of motion.      Cervical back: Normal range of motion.      Right lower leg: No edema.      Left lower leg: No edema.   Neurological:      General: No focal deficit present.      Mental Status: She is alert and oriented to person, place, and time.   Psychiatric:         Mood and Affect: Mood normal.         Behavior: Behavior normal. "         Assessment/Plan       Arthritic pain: recently saw Stalin, offered CSI, plans on starting PT    Osteoporosis: 9/24/2021 DEXA, not interested in pharmaceutical treatment, making sure to get adequate vitamin D, dietary calcium and weightbearing exercises     Health maintenance  -Mammogram: 9/16/2022  -Pap: N/A  -DEXA: See above  -Last colonoscopy:  October 2022, normal; repeat 5 years  -Smoking history: Never  -Counseled regarding diet and exercise  -Immunizations: Recommend annual influenza, otherwise UTD  -Followup annually and as needed   Problem List Items Addressed This Visit    None           Jacoby Henry MD    PROVIDER:[TOKEN:[[id number]],FOLLOWUP:[2 weeks],ESTABLISHEDPATIENT:[T]] PROVIDER:[TOKEN:[[id number]],FOLLOWUP:[2 weeks]],PROVIDER:[TOKEN:[[id number]],FOLLOWUP:[2 weeks]]

## 2025-05-29 NOTE — PROGRESS NOTE ADULT - SUBJECTIVE AND OBJECTIVE BOX
SANFORD LV EF ~ 35% moderate MR     Plan  Left heart cath today   ARNI   MRA   B-Blockers   SGLT2 pre discharge   DAPT and statin

## 2025-05-29 NOTE — DISCHARGE NOTE PROVIDER - CARE PROVIDERS DIRECT ADDRESSES
[email or web address]

## 2025-05-29 NOTE — DISCHARGE NOTE PROVIDER - CARE PROVIDER_API CALL
Luis Magañaemiah  Cardiovascular Disease  [address] [place] [address]  Phone: [phone]  Fax: [phone]  Established Patient  Follow Up Time: 2 weeks   Janay Yoo  Vascular Surgery  [address] [facility], [address]  [place] [address]  Phone: [phone]  Fax: [phone]  Follow Up Time: 2 weeks    Buck Escalera  Cardiovascular Disease  [address] [place] [id number]  Phone: [phone]  Fax: [phone]  Follow Up Time: 2 weeks

## 2025-05-29 NOTE — PROGRESS NOTE ADULT - ASSESSMENT
65 y/o M w/ PMH of COPD (not on home O2), active smoking (2ppd for min of 40pck years), HTN, HLD, CAD s/p PCI, AAA s/p repair, remote hx of etoh use disorder (last drink 2010), HFmrEF (EF 45-50% 2016), psoriasis, PVD s/p RLE bypass (follows w/ vascular) presented to ED overnight c/o substernal chest pressure w/ associated Rt arm pain.  In ED pt initially placed on 6L NC but no reported hypoxia.  Now satting well on room air.  Clinically near euvolemic at this time and not bronchospastic on exam. Initial w/u significant for Hb 8.8, BNP >2K, trops 105-->113, Ddimer 587, EKG w/ no acute ischemic changes initially but repeat EKG now w/ ST depressions in I, II and V5.   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 The thoracic aorta measures 3.6 cm in diameter in this location. Significant stenosis of the proximal SMA suspected. Partially imaged 4.2 cm abdominal aortic aneurysm with peripheral mural thrombus.  s/p 40mg IV lasix, 324mg ASA, 40mg po prednisone and 1L NS given in ED.  Cardio and CTSx consulted.  Will admit for acute decompensated HF and r/o ACS.      Acute hypoxic respiratory failure 2/2 acute HFrEF  - now resolved, on RA, euvolemic today  - s/p IV lasix to 40mg PO qd  - Goal O2 sat 88-92% in light of underlying COPD  - [facility] cardio following  - daily weights, I/O's and fluid restricted diet    NSTEMI  - EKGs 5/25 with persistent TWI in leads 1, aVL, V4-V6 and ST depressions V5-V6  - Ddimer 587, CTA w/o PE, venous duplex without DVT  - [facility] cardio following  - planned for [facility] today  - c/w heparin drip  - asa and lipitor 80mg  - toprol 100mg qd  - lipid profile/a1c noted  - s/p L/RHC 5/27 with severe proximal LAD and ramus disease ; no intervention; may need PCI of ramus and LAD in future   - s/p SANFORD 5/28: LVEF 35-40%, mod MR, trace TR, fibrocalcific aortic valve sclerosis without stenosis   - follow up post cath cardio recs    New onset HFrEF not in exacerbation   - TTE w/ EF 40-45% and WMA  - cardio adjusted meds  - c/w toprol 100mg qd  - entrestio, aldactone  - lasix 40mg PO qd    Microcytic anemia   - Last blood work was from 2016 at which time pt noted to be anemic w/ baseline Hb 9-11  - hgb trend stable today  - labs show iron deficiency, s/p 1 dose IV iron  - B12 wnl  - outpatient age appropriate malignancy work ups on d/c    PVD s/p RLE bypass  - Has chronic RLE pain, better with movement according to the patient  - pulses palpable and RLE warm  - s/p CT RLE w/ contrast on 5/22 ordered by vascular and result pending   - LE venous duplex negative for DVT  - f/u with vascular as outpatient    Incidental CTA chest finding   - Found to have saccular aneurysm versus penetrating ulcer of the proximal descending thoracic aorta near left subclavian artery takeoff, significant stenosis of the proximal SMA suspected and 4.2 cm abdominal aortic aneurysm with peripheral mural thrombus.  - CTSx consulted, rec appreciated, no acute surgical intervention needed at this time   - c/w high dose statin  - BP control  - no acute surgical intervention needed at this time   - CTSx recommending repeat CT in 3 months and f/u w/ Dr. Barnes as outpatient    HTN / HLD  - holding home lisinopril while getting IV lasix  - lopressor 50mg BID  - lipitor 80mg    Nicotine dependence  - prn nicotine patch   - cessation advised     VTE ppx: heparin drip  Dispo: anticipate home next 1-2 days

## 2025-05-29 NOTE — PROGRESS NOTE ADULT - SUBJECTIVE AND OBJECTIVE BOX
[facility] Division of Medicine    SUBJECTIVE / OVERNIGHT EVENTS: No overnight events as per RN. Pt seen at the bedside with family present. Endorses feeling well. Denies any new complaints. All other systems reviewed and are negative.    MEDICATIONS  (STANDING):  aspirin  chewable 81 milliGRAM(s) Oral daily  atorvastatin 80 milliGRAM(s) Oral at bedtime  fluticasone propionate/ salmeterol 250-50 MICROgram(s) Diskus 1 Dose(s) Inhalation two times a day  furosemide    Tablet 40 milliGRAM(s) Oral daily  heparin  Infusion.  Unit(s)/Hr (10 mL/Hr) IV Continuous <Continuous>  metoprolol succinate  milliGRAM(s) Oral daily  sacubitril 49 mG/valsartan 51 mG 1 Tablet(s) Oral two times a day  spironolactone 25 milliGRAM(s) Oral daily    MEDICATIONS  (PRN):  acetaminophen     Tablet .. 650 milliGRAM(s) Oral every 6 hours PRN Temp greater or equal to 38C (100.4F), Mild Pain (1 - 3)  albuterol/ipratropium for Nebulization 3 milliLiter(s) Nebulizer every 6 hours PRN Shortness of Breath and/or Wheezing  aluminum hydroxide/magnesium hydroxide/simethicone Suspension 30 milliLiter(s) Oral every 4 hours PRN Dyspepsia  melatonin 3 milliGRAM(s) Oral at bedtime PRN Insomnia  ondansetron Injectable 4 milliGRAM(s) IV Push every 8 hours PRN Nausea and/or Vomiting      I&O's Summary    28 May 2025 07:01  -  29 May 2025 07:00  --------------------------------------------------------  IN: 514 mL / OUT: 600 mL / NET: -86 mL        acetaminophen     Tablet .. 650 milliGRAM(s) Oral every 6 hours PRN  albuterol/ipratropium for Nebulization 3 milliLiter(s) Nebulizer every 6 hours PRN  aluminum hydroxide/magnesium hydroxide/simethicone Suspension 30 milliLiter(s) Oral every 4 hours PRN  aspirin  chewable 81 milliGRAM(s) Oral daily  atorvastatin 80 milliGRAM(s) Oral at bedtime  fluticasone propionate/ salmeterol 250-50 MICROgram(s) Diskus 1 Dose(s) Inhalation two times a day  furosemide    Tablet 40 milliGRAM(s) Oral daily  heparin  Infusion.  Unit(s)/Hr IV Continuous <Continuous>  melatonin 3 milliGRAM(s) Oral at bedtime PRN  metoprolol succinate  milliGRAM(s) Oral daily  ondansetron Injectable 4 milliGRAM(s) IV Push every 8 hours PRN  sacubitril 49 mG/valsartan 51 mG 1 Tablet(s) Oral two times a day  spironolactone 25 milliGRAM(s) Oral daily      PHYSICAL EXAM:  Vital Signs Last 24 Hrs  T(C): 36.8 (29 May 2025 15:33), Max: 36.8 (29 May 2025 00:08)  T(F): 98.2 (29 May 2025 15:33), Max: 98.2 (29 May 2025 00:08)  HR: 60 (29 May 2025 15:33) (55 - 75)  BP: 139/64 (29 May 2025 15:33) (117/69 - 157/73)  BP(mean): --  RR: 16 (29 May 2025 15:33) (16 - 18)  SpO2: 98% (29 May 2025 15:33) (95% - 98%)    Parameters below as of 29 May 2025 15:33  Patient On (Oxygen Delivery Method): room air          CONSTITUTIONAL: no apparent distress  RESP: No respiratory distress, clear to auscultation bilaterally, no crackles or wheezes  CV: RRR, +S1S2, no peripheral edema  GI: Soft, NT, ND  PSYCH: A+O x 3  NEURO: Cooperative, upper and lower motor function grossly intact bilaterally, sensation grossly intact throughout      LABS:                        9.9    7.57  )-----------( 230      ( 29 May 2025 04:09 )             32.7     05-29    140  |  105  |  30.3[H]  ----------------------------<  103[H]  3.7   |  21.0[L]  |  1.09    Ca    8.9      29 May 2025 04:09  Phos  3.4     05-29  Mg     2.2     05-29      PTT - ( 29 May 2025 13:35 )  PTT:52.9 sec      Urinalysis Basic - ( 29 May 2025 04:09 )    Color: x / Appearance: x / SG: x / pH: x  Gluc: 103 mg/dL / Ketone: x  / Bili: x / Urobili: x   Blood: x / Protein: x / Nitrite: x   Leuk Esterase: x / RBC: x / WBC x   Sq Epi: x / Non Sq Epi: x / Bacteria: x        CAPILLARY BLOOD GLUCOSE          IMAGING:

## 2025-05-29 NOTE — CONSULT NOTE ADULT - ATTENDING COMMENTS
seen and examined extensive vascular history including aortobifem and right fem-VIKKI bypass s/p right groin access now with acute limb ischemia based on exam [place]-2B (decrease motor in toes)    plan for emergent OR for femoral thromboembolectomy, possible interposition bypass possible fasciotomy possible angiogram  discussed with patient risks which include bleeding infection perioperative MI DVT and limb loss patient understands and agrees to proceed all questions answered

## 2025-05-29 NOTE — PROGRESS NOTE ADULT - SUBJECTIVE AND OBJECTIVE BOX
[place] [facility] - [facility] THE [facility]                                   [address] [place] [address]                                                      PHONE: [phone]                                                         FAX: [phone]  [email or web address]  ---------------------------------------------------------------------------------------------------------------------------------    Overnight events/patient complaints:      PAST MEDICAL & SURGICAL HISTORY:  Hypertension      Hypercholesterolemia      AAA (abdominal aortic aneurysm)      Alcoholism /alcohol abuse  Last drink 2010      Aneurysm artery, femoral  left      Coronary artery disease involving native coronary artery of native heart without angina pectoris  PCI at [facility]      AAA (abdominal aortic aneurysm)  Endurant II Abdominal l Stent Medtronic Graft Implanted      History of extremity bypass graft  Right      H/O inguinal hernia  x2      S/P appendectomy      Abdominal adhesions  Around small bowel      H/O cardiac catheterization          No Known Allergies    MEDICATIONS  (STANDING):  aspirin  chewable 81 milliGRAM(s) Oral daily  atorvastatin 80 milliGRAM(s) Oral at bedtime  fluticasone propionate/ salmeterol 250-50 MICROgram(s) Diskus 1 Dose(s) Inhalation two times a day  furosemide    Tablet 40 milliGRAM(s) Oral daily  isosorbide   mononitrate ER Tablet (IMDUR) 30 milliGRAM(s) Oral daily  metoprolol succinate  milliGRAM(s) Oral daily  sacubitril 49 mG/valsartan 51 mG 1 Tablet(s) Oral two times a day  spironolactone 25 milliGRAM(s) Oral daily    MEDICATIONS  (PRN):  acetaminophen     Tablet .. 650 milliGRAM(s) Oral every 6 hours PRN Temp greater or equal to 38C (100.4F), Mild Pain (1 - 3)  albuterol/ipratropium for Nebulization 3 milliLiter(s) Nebulizer every 6 hours PRN Shortness of Breath and/or Wheezing  aluminum hydroxide/magnesium hydroxide/simethicone Suspension 30 milliLiter(s) Oral every 4 hours PRN Dyspepsia  melatonin 3 milliGRAM(s) Oral at bedtime PRN Insomnia  ondansetron Injectable 4 milliGRAM(s) IV Push every 8 hours PRN Nausea and/or Vomiting      Vital Signs Last 24 Hrs  T(C): 36.8 (29 May 2025 15:33), Max: 36.8 (29 May 2025 00:08)  T(F): 98.2 (29 May 2025 15:33), Max: 98.2 (29 May 2025 00:08)  HR: 72 (29 May 2025 17:50) (55 - 78)  BP: 203/113 (29 May 2025 17:50) (117/69 - 213/78)  BP(mean): --  RR: 16 (29 May 2025 17:50) (16 - 18)  SpO2: 97% (29 May 2025 17:50) (95% - 100%)    Parameters below as of 29 May 2025 17:50  Patient On (Oxygen Delivery Method): room air      ICU Vital Signs Last 24 Hrs  JULIO CESAR JONES  I&O's Detail    28 May 2025 07:01  -  29 May 2025 07:00  --------------------------------------------------------  IN:    Heparin: 174 mL    Oral Fluid: 340 mL  Total IN: 514 mL    OUT:    Voided (mL): 600 mL  Total OUT: 600 mL    Total NET: -86 mL        I&O's Summary    28 May 2025 07:01  -  29 May 2025 07:00  --------------------------------------------------------  IN: 514 mL / OUT: 600 mL / NET: -86 mL      Drug Dosing Weight  JULIO CESAR JONES      PHYSICAL EXAM:  General: Appears well, alert and cooperative.  HEENT: Head; normocephalic, atraumatic.  Eyes: Pupils reactive, cornea wnl.  Neck: Supple, no nodes adenopathy, no NVD or carotid bruit or thyromegaly.  CARDIOVASCULAR: Normal S1 and S2, No murmur, rub, gallop or lift.   LUNGS: No rales, rhonchi or wheeze. Normal breath sounds bilaterally.  ABDOMEN: Soft, nontender without mass or organomegaly. bowel sounds normoactive.  EXTREMITIES: No clubbing, cyanosis or edema. Distal pulses wnl.   SKIN: warm and dry with normal turgor.  NEURO: Alert/oriented x 3/normal motor exam. No pathologic reflexes.    PSYCH: normal affect.        LABS:                        9.9    7.57  )-----------( 230      ( 29 May 2025 04:09 )             32.7     05-29    140  |  105  |  30.3[H]  ----------------------------<  103[H]  3.7   |  21.0[L]  |  1.09    Ca    8.9      29 May 2025 04:09  Phos  3.4     05-29  Mg     2.2     05-29      JULIO CESAR LAPINE      PTT - ( 29 May 2025 13:35 )  PTT:52.9 sec  Urinalysis Basic - ( 29 May 2025 04:09 )    Color: x / Appearance: x / SG: x / pH: x  Gluc: 103 mg/dL / Ketone: x  / Bili: x / Urobili: x   Blood: x / Protein: x / Nitrite: x   Leuk Esterase: x / RBC: x / WBC x   Sq Epi: x / Non Sq Epi: x / Bacteria: x        RADIOLOGY & ADDITIONAL STUDIES:    INTERPRETATION OF TELEMETRY (personally reviewed):    ECG:    ECHO:    STRESS TEST:    CARDIAC CATHETERIZATION:5/29-unable to pass a wire thru either CFA due to severe PVD/aneurysmal disease ( discussed w/ pt's vasc surgeon/Dr West ) .   Also- R radial now occluded. Pt w severe LE pain and restlessness/procedure aborted.    ASSESSMENT AND PLAN:  In summary, JULIO CESAR JONES is a 64y Male with severe PVD/no femoral access ( see above ) /admitted w/ LV dysfxn/MR/severe ramus stenosis and now on GDMT/at this point R brachial access for PCI can be considered though treating his CAD medically ( w/ DAPT/low dose nitrates ) may be the best approach.    Thank you for allowing [facility] to participate in the care of this patient.  Please feel free to call with any questions or concerns.

## 2025-05-29 NOTE — CONSULT NOTE ADULT - SUBJECTIVE AND OBJECTIVE BOX
HPI: Patient is a 64y old Male who presents with a chief complaint of acute decompensated HF and s/p C 5/27/25 which revealed Severe ramus and borderline LAD disease; Non dominant RCA disease.   Now s/p unsuccessful attempt at access via bilateral femoral arteries for staged PCI by Dr. Escalera: Procedure aborted secondary to no access obtained; post procedure c/b loss of Right PP. vascular surgery consulted due to concern of cold right leg after cath.    Patient seen at the bedside and evaluated. Severe right foot pain with numbness in the tips of his toes, both are new. Had palpable pulses pre intervention per documentation. Hx of EVAR with subsequent explantation and aortic reconstruction due to infection, fem-pop bypass, CFA aneurysm repair as well as multiple other vascular surgical interventions.                           9.9    7.57  )-----------( 230      ( 29 May 2025 04:09 )             32.7   05-29    140  |  105  |  30.3[H]  ----------------------------<  103[H]  3.7   |  21.0[L]  |  1.09    Ca    8.9      29 May 2025 04:09  Phos  3.4     05-29  Mg     2.2     05-29    VITALS:   T(C): 36.5 (05-29-25 @ 19:20), Max: 36.8 (05-29-25 @ 00:08)  HR: 73 (05-29-25 @ 19:20) (55 - 78)  BP: 192/76 (05-29-25 @ 19:20) (117/69 - 213/78)  RR: 18 (05-29-25 @ 19:20) (15 - 18)  SpO2: 97% (05-29-25 @ 19:20) (95% - 100%)    GENERAL: painful distress  HEAD:  Atraumatic, normocephalic  EYES: Conjunctiva and sclera clear  ENT: Moist mucous membranes  NECK: Supple, no JVD  HEART: Regular rate and rhythm  LUNGS: Unlabored respirations.    ABDOMEN: Soft, nontender, nondistended  EXTREMITIES: RLE with palp femoral, weak pop signal, no pedal signals, numbness to tips of toes, no motor deficits  NERVOUS SYSTEM:  A&Ox3  SKIN: No rashes or lesions     HPI: Patient is a 64y old Male who presents with a chief complaint of acute decompensated HF and s/p C 5/27/25 which revealed Severe ramus and borderline LAD disease; Non dominant RCA disease.   Now s/p unsuccessful attempt at access via bilateral femoral arteries for staged PCI by Dr. Escalera: Procedure aborted secondary to no access obtained; post procedure c/b loss of Right PP. vascular surgery consulted due to concern of cold right leg after cath.    Patient seen at the bedside and evaluated. Severe right foot pain with numbness in the tips of his toes, both are new. Had palpable pulses pre intervention per documentation. Hx of EVAR with subsequent explantation and aortic reconstruction due to infection, fem-pop bypass, CFA aneurysm repair as well as multiple other vascular surgical interventions.                           9.9    7.57  )-----------( 230      ( 29 May 2025 04:09 )             32.7   05-29    140  |  105  |  30.3[H]  ----------------------------<  103[H]  3.7   |  21.0[L]  |  1.09    Ca    8.9      29 May 2025 04:09  Phos  3.4     05-29  Mg     2.2     05-29    VITALS:   T(C): 36.5 (05-29-25 @ 19:20), Max: 36.8 (05-29-25 @ 00:08)  HR: 73 (05-29-25 @ 19:20) (55 - 78)  BP: 192/76 (05-29-25 @ 19:20) (117/69 - 213/78)  RR: 18 (05-29-25 @ 19:20) (15 - 18)  SpO2: 97% (05-29-25 @ 19:20) (95% - 100%)    GENERAL: painful distress  HEAD:  Atraumatic, normocephalic  EYES: Conjunctiva and sclera clear  ENT: Moist mucous membranes  NECK: Supple, no JVD  HEART: Regular rate and rhythm  LUNGS: Unlabored respirations.    ABDOMEN: Soft, nontender, nondistended  EXTREMITIES: RLE with palp femoral, weak pop signal, no pedal signals, numbness to tips of toes, with some weakness  NERVOUS SYSTEM:  A&Ox3  SKIN: No rashes or lesions

## 2025-05-29 NOTE — PROVIDER CONTACT NOTE (OTHER) - DATE AND TIME:
24-May-2025 21:34
27-May-2025 19:48
28-May-2025 06:40
29-May-2025 04:29
29-May-2025 05:09
29-May-2025 19:24
27-May-2025 21:51
29-May-2025 19:05
28-May-2025 21:57
29-May-2025 04:31

## 2025-05-29 NOTE — DISCHARGE NOTE PROVIDER - NSDCCPTREATMENT_GEN_ALL_CORE_FT
PRINCIPAL PROCEDURE  Procedure: Left heart cardiac catheterization  Findings and Treatment: No heavy lifting, driving, sex, tub baths, swimming, or any activity that submerges the lower half of the body in water for 48 hours.  Limited walking and stairs for 48 hours.    Change the bandaid after 24 hours and every 24 hours after that.  Keep the puncture site dry and covered with a bandaid until a scab forms.    Observe the site frequently.  If bleeding or a large lump (the size of a golf ball or bigger) occurs lie flat, apply continuous direct pressure just above the puncture site for at least 10 minutes, and notify your physician immediately.  If the bleeding cannot be controlled, call 911 immediately for assistance.  Notify your physician of pain, swelling or any drainage.    Notify your physician immediately if coldness, numbness, discoloration or pain in your foot occurs.       PRINCIPAL PROCEDURE  Procedure: Revision of femoropopliteal arterial bypass, using polytetrafluoroethylene (PTFE) graft  Findings and Treatment: [place] 2B, thrombosed prior FEM-AT bypass on R Leg, R groin exploration   Resection of CFA aneurysm, CFA endarterectomy at origin, profunda endarterectomy at origin  Interposition bypass of R CFA to previous proximal portion of R FEM-AT bypass using PTFE, end to side profunda anastomosis  Thromboembolectomy of previous FEM-AT bypass, cutdown at mid thigh over previous R FEM-AT bypass for further thromboembolectomy and clamping of the vessel

## 2025-05-29 NOTE — CHART NOTE - NSCHARTNOTEFT_GEN_A_CORE
Pre Procedure Note: Pt presenting for [facility]   HPI----63 y/o M w/ PMH of COPD (not on home O2), active smoking (2ppd for min of 40pck years), HTN, HLD, CAD s/p PCI, AAA s/p repair, remote hx of etoh use disorder (last drink 2010), HFmrEF (EF 45-50% 2016), psoriasis, PVD s/p RLE bypass (follows w/ vascular) presented to ED. Admitted     Procedure Service:  Cardiology   Procedure Name: Transesophageal Echo  Pre Procedure Evaluation: Mallampati and SANFORD eval by anesthesia.      RELEVANT PMH/PSH:  Heart failure  Hypertension  Hypercholesterolemia  AAA (abdominal aortic aneurysm)  Alcoholism /alcohol abuse  Aneurysm artery, femoral  Coronary artery disease involving native coronary artery of native heart without angina pectoris  Acute CHF  Abnormal CT of the chest  AAA (abdominal aortic aneurysm)  History of extremity bypass graft  H/O inguinal hernia  S/P appendectomy  Abdominal adhesions  H/O cardiac catheterization      CONCLUSIONS:  5/24/25    1. Left ventricular cavity is normal in size. Left ventricular wall thickness is normal. Left ventricular systolic function is mildly decreased with an ejection fraction visually estimated at 40 to 45 %. Global left ventricular hypokinesis.   2. There is mild (grade 1) left ventricular diastolic dysfunction, with normal left ventricular filling pressure.   3. Normal right ventricular cavity size, with normal wall thickness, and normal right ventricular systolic function.   4. Moderate to severe mitral regurgitation.   5. No pericardial effusion seen.   6. No prior echocardiogram is available for comparison.   7. Pulmonary artery systolic pressure could not be estimated.   8. Recommendations: Consider SANFORD for evaluation of Mitral regurgitation.    Procedures Performed   Procedures:               1.    Venous Access - Right Brachial   2.    Arterial Access - Right Radial   3.    RHC   4.    Diagnostic Coronary Angiography   5.    Left Heart Cath   Indications:               Unstable angina   CCS Class IV   Diagnostic Conclusions:   Severe ramus and borderline LAD disease.  Non dominant RCA disease.  MR  Recommendations:   SANFORD to eval MR   May need PCI of ramus and LAD in future   Acute complication:    No complications     5/28/25  CONCLUSIONS:  SANFORD    1. Left ventricular systolic function is moderately decreased with an ejection fraction visually estimated at 35to 40 %.   2. Moderate mitral regurgitation.   3. Pulmonic valve was not well visualized.   4. No evidence of a patent foramen ovale by color flow Doppler.   5. Fibrocalcific aortic valve sclerosis without stenosis.   6. Trace tricuspid regurgitation.      PHYSICAL EXAM:  Vital Signs:  T(C): 36.6 (05-29-25 @ 11:35), Max: 36.8 (05-29-25 @ 00:08)  HR: 66 (05-29-25 @ 11:35) (55 - 75)  BP: 126/67 (05-29-25 @ 11:35) (117/69 - 157/73)  RR: 18 (05-29-25 @ 11:35) (17 - 18)  SpO2: 95% (05-29-25 @ 11:35) (95% - 99%)  Appearance: Normal	  HEENT:   Normal oral mucosa, PERRL, EOMI	  Cardiovascular: Normal S1 S2, No JVD, No murmurs, No edema  Respiratory: Lungs clear to auscultation	  Gastrointestinal:  Soft, Non-tender, + BS	  Neurologic/PSY: Non-focal, A&O x 3  Extremities: No clubbing, cyanosis or edema    LABS: reviewed  	    Plan: 	  C   Normal Saline bolus pre cath     Mallampati 3   ASA 3   BRA 2.3 Pre Procedure Note: Pt presenting for [facility]   HPI----65 y/o M w/ PMH of COPD (not on home O2), active smoking (2ppd for min of 40pck years), HTN, HLD, CAD s/p PCI, AAA s/p repair, remote hx of etoh use disorder (last drink 2010), HFmrEF (EF 45-50% 2016), psoriasis, PVD s/p RLE bypass (follows w/ vascular) presented to ED. Admitted     Procedure Service:  Cardiology   Procedure Name: Transesophageal Echo  Pre Procedure Evaluation: Mallampati and SANFORD eval by anesthesia.      RELEVANT PMH/PSH:  Heart failure  Hypertension  Hypercholesterolemia  AAA (abdominal aortic aneurysm)  Alcoholism /alcohol abuse  Aneurysm artery, femoral  Coronary artery disease involving native coronary artery of native heart without angina pectoris  Acute CHF  Abnormal CT of the chest  AAA (abdominal aortic aneurysm)  History of extremity bypass graft  H/O inguinal hernia  S/P appendectomy  Abdominal adhesions  H/O cardiac catheterization      CONCLUSIONS:  5/24/25    1. Left ventricular cavity is normal in size. Left ventricular wall thickness is normal. Left ventricular systolic function is mildly decreased with an ejection fraction visually estimated at 40 to 45 %. Global left ventricular hypokinesis.   2. There is mild (grade 1) left ventricular diastolic dysfunction, with normal left ventricular filling pressure.   3. Normal right ventricular cavity size, with normal wall thickness, and normal right ventricular systolic function.   4. Moderate to severe mitral regurgitation.   5. No pericardial effusion seen.   6. No prior echocardiogram is available for comparison.   7. Pulmonary artery systolic pressure could not be estimated.   8. Recommendations: Consider SANFORD for evaluation of Mitral regurgitation.    Procedures Performed   Procedures:               1.    Venous Access - Right Brachial   2.    Arterial Access - Right Radial   3.    RHC   4.    Diagnostic Coronary Angiography   5.    Left Heart Cath   Indications:               Unstable angina   CCS Class IV   Diagnostic Conclusions:   Severe ramus and borderline LAD disease.  Non dominant RCA disease.  MR  Recommendations:   SANFORD to eval MR   May need PCI of ramus and LAD in future   Acute complication:    No complications     5/28/25  CONCLUSIONS:  SANFORD    1. Left ventricular systolic function is moderately decreased with an ejection fraction visually estimated at 35to 40 %.   2. Moderate mitral regurgitation.   3. Pulmonic valve was not well visualized.   4. No evidence of a patent foramen ovale by color flow Doppler.   5. Fibrocalcific aortic valve sclerosis without stenosis.   6. Trace tricuspid regurgitation.      PHYSICAL EXAM:  Vital Signs:  T(C): 36.6 (05-29-25 @ 11:35), Max: 36.8 (05-29-25 @ 00:08)  HR: 66 (05-29-25 @ 11:35) (55 - 75)  BP: 126/67 (05-29-25 @ 11:35) (117/69 - 157/73)  RR: 18 (05-29-25 @ 11:35) (17 - 18)  SpO2: 95% (05-29-25 @ 11:35) (95% - 99%)  Appearance: Normal	  HEENT:   Normal oral mucosa, PERRL, EOMI	  Cardiovascular: Normal S1 S2, No JVD, No murmurs, No edema  Respiratory: Lungs clear to auscultation	  Gastrointestinal:  Soft, Non-tender, + BS	  Neurologic/PSY: Non-focal, A&O x 3  Extremities: No clubbing, cyanosis or edema    LABS: reviewed  	    Plan: 	  C   No Normal Saline bolus pre cath deferred for newly reduced EF 35% and HF     Mallampati 3   ASA 3   BRA 2.3

## 2025-05-29 NOTE — DISCHARGE NOTE PROVIDER - NSDCMRMEDTOKEN_GEN_ALL_CORE_FT
aspirin 81 mg oral tablet: 1 tab(s) orally once a day  atorvastatin 80 mg oral tablet: 1 tab(s) orally once a day (at bedtime)  lisinopril 10 mg oral tablet: 1 tab(s) orally once a day   apixaban 5 mg oral tablet: 2 tab(s) orally 2 times a day  apixaban 5 mg oral tablet: 1 tab(s) orally 2 times a day To start after completion of 10mg BID for 7 days.  atorvastatin 80 mg oral tablet: 1 tab(s) orally once a day (at bedtime)  clopidogrel 75 mg oral tablet: 1 tab(s) orally once a day  lisinopril 10 mg oral tablet: 1 tab(s) orally once a day  metoprolol succinate 100 mg oral tablet, extended release: 1 tab(s) orally once a day

## 2025-05-29 NOTE — CHART NOTE - NSCHARTNOTEFT_GEN_A_CORE
Impression: Presbyopia: H52.4. Plan: New glasses RX dispensed today. Interventional NP post procedure note:                                           Narrative:   64y  Male is now s/p unsuccessful attempt at access via bilateral femoral arteries for staged PCI by Dr. Escalera: Procedure aborted secondary to no access obtained;  tolerated procedure well without complications. Arrived to recovery room NAD and hemodynamically stable, distal pulse +, neurovascular intact          PAST MEDICAL & SURGICAL HISTORY:  Hypertension  Hypercholesterolemia  AAA (abdominal aortic aneurysm)  Alcoholism /alcohol abuse  Last drink 2010  Aneurysm artery, femoral  left  Coronary artery disease involving native coronary artery of native heart without angina pectoris  PCI at [facility]  AAA (abdominal aortic aneurysm)  Endurant II Abdominal l Stent Medtronic Graft Implanted  History of extremity bypass graft  Right  H/O inguinal hernia  x2  S/P appendectomy  Abdominal adhesions  Around small bowel  H/O cardiac catheterization    Home Medications:  aspirin 81 mg oral tablet: 1 tab(s) orally once a day (24 May 2025 13:26)  atorvastatin 80 mg oral tablet: 1 tab(s) orally once a day (at bedtime) (24 May 2025 13:26)  lisinopril 10 mg oral tablet: 1 tab(s) orally once a day (24 May 2025 13:26)    No Known Allergies      Objective:  Vital Signs Last 24 Hrs  T(C): 36.8 (29 May 2025 15:33), Max: 36.8 (29 May 2025 00:08)  T(F): 98.2 (29 May 2025 15:33), Max: 98.2 (29 May 2025 00:08)  HR: 72 (29 May 2025 17:50) (55 - 78)  BP: 203/113 (29 May 2025 17:50) (117/69 - 213/78)  BP(mean): --  RR: 16 (29 May 2025 17:50) (16 - 18)  SpO2: 97% (29 May 2025 17:50) (95% - 100%)    Parameters below as of 29 May 2025 17:50  Patient On (Oxygen Delivery Method): room air        GENERAL: Pt lying supine with HOB < 30 degrees; c/o chronic leg cramps  ENMT: PERRL, +EOMI.  NECK: soft, Supple, No JVD,   CHEST/LUNG: Clear to auscultatation bilaterally; No wheezing.  HEART: S1S2+, Regular rate and rhythm; No murmurs.  ABDOMEN: Soft, Nontender, Nondistended; Bowel sounds present.  MUSCULOSKELETAL: Normal range of motion.  SKIN: No rashes or lesions.  NEURO: AAOX3, no focal deficits, no motor r sensory loss.  PSYCH: normal mood.  Procedure site: Bilateral groin , no signs of bleeding or hematoma, neurovascular intact; + distal pulses                            9.9    7.57  )-----------( 230      ( 29 May 2025 04:09 )             32.7     05-29    140  |  105  |  30.3[H]  ----------------------------<  103[H]  3.7   |  21.0[L]  |  1.09    Ca    8.9      29 May 2025 04:09  Phos  3.4     05-29  Mg     2.2     05-29      PTT - ( 29 May 2025 13:35 )  PTT:52.9 sec    Patient is a 64y old Male who presents with a chief complaint of acute decompensated HF and s/p LHC 5/27/25 which revealed Severe ramus and borderline LAD disease; Non dominant RCA disease.   Now s/p unsuccessful attempt at access via bilateral femoral arteries for staged PCI by Dr. Escalera: Procedure aborted secondary to no access obtained        -Bedrest with HOB < 30 degrees x 1 hour post procedure then OOB as tolerated  -follow up outpt in 2 weeks with Cardiologist (Dr. Magaña)  -Plan for medical management of CAD for now:  Continue current medical therapy with Entresto, toprol, high dose statin; Add Imdur 30 mg daily; Dual anti platelet therapy with aspirin/ plavix  -May attempt PCI again at a later date  -Discontinue Heparin infusion  -NS 0.9% 250ml/hr x 1 bolus: post procedure THADDEUS ppx Held secondary to procedure aborted and no contrast used  -Consider adding Amlodipine if BP can tolerate after adding Imdur  -IV Fentanyl, IV ofirmev, and IV toradol given post procedure for leg cramps  -Lifestyle modifications discussed to reduce cardiovascular risk factors including weight reduction, smoking cessation, medication compliance, and routine follow up with Cardiologist to track your BMI, cholesterol, and glucose levels.   -Discussed with Dr. Escalera Interventional NP post procedure note:                                           Narrative:   64y  Male is now s/p unsuccessful attempt at access via bilateral femoral arteries for staged PCI by Dr. Escalera: Procedure aborted secondary to no access obtained         PAST MEDICAL & SURGICAL HISTORY:  Hypertension  Hypercholesterolemia  AAA (abdominal aortic aneurysm)  Alcoholism /alcohol abuse  Last drink 2010  Aneurysm artery, femoral  left  Coronary artery disease involving native coronary artery of native heart without angina pectoris  PCI at [facility]  AAA (abdominal aortic aneurysm)  Endurant II Abdominal l Stent Medtronic Graft Implanted  History of extremity bypass graft  Right  H/O inguinal hernia  x2  S/P appendectomy  Abdominal adhesions  Around small bowel  H/O cardiac catheterization    Home Medications:  aspirin 81 mg oral tablet: 1 tab(s) orally once a day (24 May 2025 13:26)  atorvastatin 80 mg oral tablet: 1 tab(s) orally once a day (at bedtime) (24 May 2025 13:26)  lisinopril 10 mg oral tablet: 1 tab(s) orally once a day (24 May 2025 13:26)    No Known Allergies      Objective:  Vital Signs Last 24 Hrs  T(C): 36.8 (29 May 2025 15:33), Max: 36.8 (29 May 2025 00:08)  T(F): 98.2 (29 May 2025 15:33), Max: 98.2 (29 May 2025 00:08)  HR: 72 (29 May 2025 17:50) (55 - 78)  BP: 203/113 (29 May 2025 17:50) (117/69 - 213/78)  BP(mean): --  RR: 16 (29 May 2025 17:50) (16 - 18)  SpO2: 97% (29 May 2025 17:50) (95% - 100%)    Parameters below as of 29 May 2025 17:50  Patient On (Oxygen Delivery Method): room air        GENERAL: Pt lying supine with HOB < 30 degrees; c/o chronic right leg/calf pain "worse than normal"  ENMT: PERRL, +EOMI.  NECK: soft, Supple, No JVD,   CHEST/LUNG: Clear to auscultatation bilaterally; No wheezing.  HEART: S1S2+, Regular rate and rhythm; No murmurs.  ABDOMEN: Soft, Nontender, Nondistended; Bowel sounds present.  MUSCULOSKELETAL: Normal range of motion.  SKIN: No rashes or lesions.  NEURO: AAOX3, no focal deficits, no motor sensory loss.  PSYCH: normal mood.  Procedure site: Bilateral groin , no signs of bleeding or hematoma; + 1 left PP; unable to doppler/palpate Right PP or PT pulse; right foot cool                            9.9    7.57  )-----------( 230      ( 29 May 2025 04:09 )             32.7     05-29    140  |  105  |  30.3[H]  ----------------------------<  103[H]  3.7   |  21.0[L]  |  1.09    Ca    8.9      29 May 2025 04:09  Phos  3.4     05-29  Mg     2.2     05-29      PTT - ( 29 May 2025 13:35 )  PTT:52.9 sec    Patient is a 64y old Male who presents with a chief complaint of acute decompensated HF and s/p LHC 5/27/25 which revealed Severe ramus and borderline LAD disease; Non dominant RCA disease.   Now s/p unsuccessful attempt at access via bilateral femoral arteries for staged PCI by Dr. Escalera: Procedure aborted secondary to no access obtained        -Bedrest with HOB < 30 degrees x 1 hour post procedure then OOB as tolerated  -follow up outpt in 2 weeks with Cardiologist (Dr. Magaña)  -Plan for medical management of CAD for now:  Continue current medical therapy with Entresto, toprol, high dose statin; Add Imdur 30 mg daily; Dual anti platelet therapy with aspirin/ plavix  -May attempt PCI again at a later date  -Restart Heparin infusion one hour post procedure if cath sites remain stable secondary loss of right PP  -NS 0.9% 250ml/hr x 1 bolus: post procedure THADDEUS ppx Held secondary to procedure aborted and no contrast used  -Consider adding Amlodipine if BP can tolerate after adding Imdur  -IV Fentanyl, IV ofirmev, and IV toradol given post procedure for leg cramps  -Lifestyle modifications discussed to reduce cardiovascular risk factors including weight reduction, smoking cessation, medication compliance, and routine follow up with Cardiologist to track your BMI, cholesterol, and glucose levels.   -Discussed with Dr. Escalera Interventional NP post procedure note:                                           Narrative:   64y  Male is now s/p unsuccessful attempt at access via bilateral femoral arteries for staged PCI by Dr. Escalera: Procedure aborted secondary to no access obtained         PAST MEDICAL & SURGICAL HISTORY:  Hypertension  Hypercholesterolemia  AAA (abdominal aortic aneurysm)  Alcoholism /alcohol abuse  Last drink 2010  Aneurysm artery, femoral  left  Coronary artery disease involving native coronary artery of native heart without angina pectoris  PCI at [facility]  AAA (abdominal aortic aneurysm)  Endurant II Abdominal l Stent Medtronic Graft Implanted  History of extremity bypass graft  Right  H/O inguinal hernia  x2  S/P appendectomy  Abdominal adhesions  Around small bowel  H/O cardiac catheterization    Home Medications:  aspirin 81 mg oral tablet: 1 tab(s) orally once a day (24 May 2025 13:26)  atorvastatin 80 mg oral tablet: 1 tab(s) orally once a day (at bedtime) (24 May 2025 13:26)  lisinopril 10 mg oral tablet: 1 tab(s) orally once a day (24 May 2025 13:26)    No Known Allergies      Objective:  Vital Signs Last 24 Hrs  T(C): 36.8 (29 May 2025 15:33), Max: 36.8 (29 May 2025 00:08)  T(F): 98.2 (29 May 2025 15:33), Max: 98.2 (29 May 2025 00:08)  HR: 72 (29 May 2025 17:50) (55 - 78)  BP: 203/113 (29 May 2025 17:50) (117/69 - 213/78)  BP(mean): --  RR: 16 (29 May 2025 17:50) (16 - 18)  SpO2: 97% (29 May 2025 17:50) (95% - 100%)    Parameters below as of 29 May 2025 17:50  Patient On (Oxygen Delivery Method): room air        GENERAL: Pt lying supine with HOB < 30 degrees; c/o chronic right leg/calf pain "worse than normal"  ENMT: PERRL, +EOMI.  NECK: soft, Supple, No JVD,   CHEST/LUNG: Clear to auscultatation bilaterally; No wheezing.  HEART: S1S2+, Regular rate and rhythm; No murmurs.  ABDOMEN: Soft, Nontender, Nondistended; Bowel sounds present.  MUSCULOSKELETAL: Normal range of motion.  SKIN: No rashes or lesions.  NEURO: AAOX3, no focal deficits, no motor sensory loss.  PSYCH: normal mood.  Procedure site: Bilateral groin , no signs of bleeding or hematoma; + 1 left PP; unable to doppler/palpate Right PP or PT pulse; right foot cool/acyanotic                            9.9    7.57  )-----------( 230      ( 29 May 2025 04:09 )             32.7     05-29    140  |  105  |  30.3[H]  ----------------------------<  103[H]  3.7   |  21.0[L]  |  1.09    Ca    8.9      29 May 2025 04:09  Phos  3.4     05-29  Mg     2.2     05-29      PTT - ( 29 May 2025 13:35 )  PTT:52.9 sec    Patient is a 64y old Male who presents with a chief complaint of acute decompensated HF and s/p LHC 5/27/25 which revealed Severe ramus and borderline LAD disease; Non dominant RCA disease.   Now s/p unsuccessful attempt at access via bilateral femoral arteries for staged PCI by Dr. Escalera: Procedure aborted secondary to no access obtained        -Bedrest with HOB < 30 degrees x 1 hour post procedure then OOB as tolerated  -follow up outpt in 2 weeks with Cardiologist (Dr. Magaña)  -Plan for medical management of CAD for now:  Continue current medical therapy with Entresto, toprol, high dose statin; Add Imdur 30 mg daily; Dual anti platelet therapy with aspirin/ plavix (Plavix 300 mg PO load now then 75mg daily)  -Consider adding Amlodipine if BP can tolerate after adding Imdur  -May attempt PCI again at a later date  -Vascular consult called for loss of right PP--as per Dr. Escalera, patient cleared from a cardiac perspective for OR/vascular procedure  -Restart Heparin infusion one hour post procedure if cath sites remain stable secondary to loss of right PP  -NS 0.9% 250ml/hr x 1 bolus: post procedure THADDEUS ppx Held secondary to procedure aborted and no contrast used  -IV Fentanyl, IV ofirmev, and IV toradol given post procedure for right leg/calf pain  -Lifestyle modifications discussed to reduce cardiovascular risk factors including weight reduction, smoking cessation, medication compliance, and routine follow up with Cardiologist to track your BMI, cholesterol, and glucose levels.   -Discussed with Dr. Escalera Interventional NP post procedure note:                                           Narrative:   64y  Male is now s/p unsuccessful attempt at access via bilateral femoral arteries for staged PCI by Dr. Escalera: Procedure aborted secondary to no access obtained         PAST MEDICAL & SURGICAL HISTORY:  Hypertension  Hypercholesterolemia  AAA (abdominal aortic aneurysm)  Alcoholism /alcohol abuse  Last drink 2010  Aneurysm artery, femoral  left  Coronary artery disease involving native coronary artery of native heart without angina pectoris  PCI at [facility]  AAA (abdominal aortic aneurysm)  Endurant II Abdominal l Stent Medtronic Graft Implanted  History of extremity bypass graft  Right  H/O inguinal hernia  x2  S/P appendectomy  Abdominal adhesions  Around small bowel  H/O cardiac catheterization    Home Medications:  aspirin 81 mg oral tablet: 1 tab(s) orally once a day (24 May 2025 13:26)  atorvastatin 80 mg oral tablet: 1 tab(s) orally once a day (at bedtime) (24 May 2025 13:26)  lisinopril 10 mg oral tablet: 1 tab(s) orally once a day (24 May 2025 13:26)    No Known Allergies      Objective:  Vital Signs Last 24 Hrs  T(C): 36.8 (29 May 2025 15:33), Max: 36.8 (29 May 2025 00:08)  T(F): 98.2 (29 May 2025 15:33), Max: 98.2 (29 May 2025 00:08)  HR: 72 (29 May 2025 17:50) (55 - 78)  BP: 203/113 (29 May 2025 17:50) (117/69 - 213/78)  BP(mean): --  RR: 16 (29 May 2025 17:50) (16 - 18)  SpO2: 97% (29 May 2025 17:50) (95% - 100%)    Parameters below as of 29 May 2025 17:50  Patient On (Oxygen Delivery Method): room air        GENERAL: Pt lying supine with HOB < 30 degrees; c/o chronic right leg/calf pain "worse than normal"  ENMT: PERRL, +EOMI.  NECK: soft, Supple, No JVD,   CHEST/LUNG: Clear to auscultatation bilaterally; No wheezing.  HEART: S1S2+, Regular rate and rhythm; No murmurs.  ABDOMEN: Soft, Nontender, Nondistended; Bowel sounds present.  MUSCULOSKELETAL: Normal range of motion.  SKIN: No rashes or lesions.  NEURO: AAOX3, no focal deficits, no motor sensory loss.  PSYCH: normal mood.  Procedure site: Bilateral groin , no signs of bleeding or hematoma; + 1 left PP; unable to doppler/palpate Right PP or PT pulse; right foot cool/acyanotic                            9.9    7.57  )-----------( 230      ( 29 May 2025 04:09 )             32.7     05-29    140  |  105  |  30.3[H]  ----------------------------<  103[H]  3.7   |  21.0[L]  |  1.09    Ca    8.9      29 May 2025 04:09  Phos  3.4     05-29  Mg     2.2     05-29      PTT - ( 29 May 2025 13:35 )  PTT:52.9 sec    Patient is a 64y old Male who presents with a chief complaint of acute decompensated HF and s/p LHC 5/27/25 which revealed Severe ramus and borderline LAD disease; Non dominant RCA disease.   Now s/p unsuccessful attempt at access via bilateral femoral arteries for staged PCI by Dr. Escalera: Procedure aborted secondary to no access obtained; post procedure c/b loss of Right PP        -Bedrest with HOB < 30 degrees x 1 hour post procedure then OOB as tolerated  -follow up outpt in 2 weeks with Cardiologist (Dr. Magaña)  -Plan for medical management of CAD for now:  Continue current medical therapy with Entresto, toprol, high dose statin; Add Imdur 30 mg daily; Dual anti platelet therapy with aspirin/ plavix (Plavix 300 mg PO load now then 75mg daily)  -Consider adding Amlodipine if BP can tolerate after adding Imdur  -May attempt PCI again at a later date  -Vascular consult called (and spoke to night covering Resident) for loss of right PP--as per Dr. Escalera, patient cleared from a cardiac perspective for OR/vascular procedure  -Restart Heparin infusion one hour post procedure if cath sites remain stable secondary to loss of right PP  -NS 0.9% 250ml/hr x 1 bolus: post procedure THADDEUS ppx Held secondary to procedure aborted and no contrast used  -IV Fentanyl, IV ofirmev, and IV toradol given post procedure for right leg/calf pain  -Lifestyle modifications discussed to reduce cardiovascular risk factors including weight reduction, smoking cessation, medication compliance, and routine follow up with Cardiologist to track your BMI, cholesterol, and glucose levels.   -Discussed with Dr. Escalera

## 2025-05-29 NOTE — PROVIDER CONTACT NOTE (OTHER) - ACTION/TREATMENT ORDERED:
Continue heparin gtt.
Maria Elena Isaacs from Cardiology notified via telephone. Np states to leave Heprain atits current rate. No new orders at this time.
Rahul Bryant notified via telephone. PA entered new order for Heparin drip.
RN Applied pressure to pts ankle to stabilize bleed. Provider made aware via telephone. GELY Bryant came to bedside and applied a Surgicel dressing with gauze and Tegaderm. No New orders at this time.
Rahul Marrero notified via telephone. Pa requests ECG. RN will perform ECG.
Maria Elena Isaacs from Cardiology notified via telephone. Np states to decrease Heparin drip by , and that she will reorder medication for pt.
Med GELY Marrero made aware. No new orders at this time
Provider Annette made aware of situation, awaiting orders.
Cardiology provider Gladys states to increase dose to 15ml/hr and that she will enter new order for pt.
Vascular MD Lemus came to assess patient at bedside. MD ordered Heparin drip for patient, Dilaudid, stat cat scan, & pt will be brought to OR. Report given to OR & pt brought down to cat scan by RN.

## 2025-05-29 NOTE — DISCHARGE NOTE PROVIDER - HOSPITAL COURSE
Patient is a 64y old Male with significant history of EVAR with subsequent explantation and aortic reconstruction due to infection, fem-pop bypass, CFA aneurysm repair as well as multiple other vascular surgical interventions. Patient who presented with a chief complaint of acute decompensated HF and s/p C 5/27/25 which revealed Severe ramus and borderline LAD disease; Non dominant RCA disease.   Underwent unsuccessful attempt at access via bilateral femoral arteries for staged PCI  with interventional cardiology. Procedure aborted secondary to no access obtained; post procedure c/b loss of Right PP. Vascular surgery consulted due to concern of cold right leg after cath on 5/29.   Patient seen at the bedside and evaluated. Severe right foot pain with numbness in the tips of his toes.  Underwent an angiogram was evident there was thombus to to previous fem-at bypass   Revision of femoropopliteal arterial bypass, using polytetrafluoroethylene (PTFE) graft Interposition bypass of R CFA to previous proximal portion of R FEM-AT bypass using PTFE, end to side profunda anastomosis    Post-op patient was admitted to the ICU FOR Q1 NV checks and started on heparin drip. Patient did well and then subsequently downgraded to q4 NV checks. Patient was seen daily and continues to do well with right DP/PT signal and Left dp palpable and RLE bypass with a signal. PT saw the patient deemed patient appropriate to go home without any needs. Daily right groin continues to have mild swelling and ecchymosis but will resorb with time. Patient was kept on heparin with therapeutic PTT and was transitioned to Eliquis on day of discharge. Patient  will be started on Eliquis 10mg per day for 7 days then transitioned to 5mg BID indefinitely.   Patient is stable for discharge at this time and will follow-up with  in 2 weeks and cardiology.       Vital Signs Last 24 Hrs  T(C): 36.7 (03 Jun 2025 11:58), Max: 36.9 (02 Jun 2025 19:19)  T(F): 98.1 (03 Jun 2025 11:58), Max: 98.5 (03 Jun 2025 04:05)  HR: 61 (03 Jun 2025 12:00) (50 - 87)  BP: 139/73 (03 Jun 2025 12:00) (137/70 - 156/74)  BP(mean): 90 (03 Jun 2025 12:00) (88 - 104)  RR: 18 (03 Jun 2025 12:00) (17 - 18)  SpO2: 98% (03 Jun 2025 12:00) (98% - 99%)    Parameters below as of 03 Jun 2025 12:00  Patient On (Oxygen Delivery Method): room air

## 2025-05-30 LAB
ANION GAP SERPL CALC-SCNC: 12 MMOL/L — SIGNIFICANT CHANGE UP (ref 5–17)
ANION GAP SERPL CALC-SCNC: 15 MMOL/L — SIGNIFICANT CHANGE UP (ref 5–17)
APTT BLD: 31.3 SEC — SIGNIFICANT CHANGE UP (ref 26.1–36.8)
APTT BLD: 34.9 SEC — SIGNIFICANT CHANGE UP (ref 26.1–36.8)
APTT BLD: 94.9 SEC — HIGH (ref 26.1–36.8)
BUN SERPL-MCNC: 20.3 MG/DL — HIGH (ref 8–20)
BUN SERPL-MCNC: 27.3 MG/DL — HIGH (ref 8–20)
CALCIUM SERPL-MCNC: 7.7 MG/DL — LOW (ref 8.4–10.5)
CALCIUM SERPL-MCNC: 7.8 MG/DL — LOW (ref 8.4–10.5)
CHLORIDE SERPL-SCNC: 106 MMOL/L — SIGNIFICANT CHANGE UP (ref 96–108)
CHLORIDE SERPL-SCNC: 107 MMOL/L — SIGNIFICANT CHANGE UP (ref 96–108)
CO2 SERPL-SCNC: 18 MMOL/L — LOW (ref 22–29)
CO2 SERPL-SCNC: 19 MMOL/L — LOW (ref 22–29)
CREAT SERPL-MCNC: 0.99 MG/DL — SIGNIFICANT CHANGE UP (ref 0.5–1.3)
CREAT SERPL-MCNC: 1.45 MG/DL — HIGH (ref 0.5–1.3)
EGFR: 54 ML/MIN/1.73M2 — LOW
EGFR: 54 ML/MIN/1.73M2 — LOW
EGFR: 85 ML/MIN/1.73M2 — SIGNIFICANT CHANGE UP
EGFR: 85 ML/MIN/1.73M2 — SIGNIFICANT CHANGE UP
GAS PNL BLDA: SIGNIFICANT CHANGE UP
GLUCOSE SERPL-MCNC: 130 MG/DL — HIGH (ref 70–99)
GLUCOSE SERPL-MCNC: 93 MG/DL — SIGNIFICANT CHANGE UP (ref 70–99)
HCT VFR BLD CALC: 26 % — LOW (ref 39–50)
HCT VFR BLD CALC: 29.9 % — LOW (ref 39–50)
HGB BLD-MCNC: 8 G/DL — LOW (ref 13–17)
HGB BLD-MCNC: 8.9 G/DL — LOW (ref 13–17)
INR BLD: 1.25 RATIO — HIGH (ref 0.85–1.16)
LACTATE SERPL-SCNC: 1 MMOL/L — SIGNIFICANT CHANGE UP (ref 0.5–2)
LACTATE SERPL-SCNC: 2.4 MMOL/L — HIGH (ref 0.5–2)
MAGNESIUM SERPL-MCNC: 1.8 MG/DL — SIGNIFICANT CHANGE UP (ref 1.6–2.6)
MAGNESIUM SERPL-MCNC: 2 MG/DL — SIGNIFICANT CHANGE UP (ref 1.6–2.6)
MCHC RBC-ENTMCNC: 23.9 PG — LOW (ref 27–34)
MCHC RBC-ENTMCNC: 24.2 PG — LOW (ref 27–34)
MCHC RBC-ENTMCNC: 29.8 G/DL — LOW (ref 32–36)
MCHC RBC-ENTMCNC: 30.8 G/DL — LOW (ref 32–36)
MCV RBC AUTO: 78.8 FL — LOW (ref 80–100)
MCV RBC AUTO: 80.2 FL — SIGNIFICANT CHANGE UP (ref 80–100)
NRBC # BLD AUTO: 0 K/UL — SIGNIFICANT CHANGE UP (ref 0–0)
NRBC # BLD AUTO: 0 K/UL — SIGNIFICANT CHANGE UP (ref 0–0)
NRBC # FLD: 0 K/UL — SIGNIFICANT CHANGE UP (ref 0–0)
NRBC # FLD: 0 K/UL — SIGNIFICANT CHANGE UP (ref 0–0)
NRBC BLD AUTO-RTO: 0 /100 WBCS — SIGNIFICANT CHANGE UP (ref 0–0)
NRBC BLD AUTO-RTO: 0 /100 WBCS — SIGNIFICANT CHANGE UP (ref 0–0)
PHOSPHATE SERPL-MCNC: 2.3 MG/DL — LOW (ref 2.4–4.7)
PHOSPHATE SERPL-MCNC: 4.7 MG/DL — SIGNIFICANT CHANGE UP (ref 2.4–4.7)
PLATELET # BLD AUTO: 190 K/UL — SIGNIFICANT CHANGE UP (ref 150–400)
PLATELET # BLD AUTO: 253 K/UL — SIGNIFICANT CHANGE UP (ref 150–400)
PMV BLD: 10.5 FL — SIGNIFICANT CHANGE UP (ref 7–13)
PMV BLD: 10.6 FL — SIGNIFICANT CHANGE UP (ref 7–13)
POTASSIUM SERPL-MCNC: 3.4 MMOL/L — LOW (ref 3.5–5.3)
POTASSIUM SERPL-MCNC: 4.2 MMOL/L — SIGNIFICANT CHANGE UP (ref 3.5–5.3)
POTASSIUM SERPL-SCNC: 3.4 MMOL/L — LOW (ref 3.5–5.3)
POTASSIUM SERPL-SCNC: 4.2 MMOL/L — SIGNIFICANT CHANGE UP (ref 3.5–5.3)
PROTHROM AB SERPL-ACNC: 14.1 SEC — HIGH (ref 9.9–13.4)
RBC # BLD: 3.3 M/UL — LOW (ref 4.2–5.8)
RBC # BLD: 3.73 M/UL — LOW (ref 4.2–5.8)
RBC # FLD: 17.5 % — HIGH (ref 10.3–14.5)
RBC # FLD: 17.7 % — HIGH (ref 10.3–14.5)
SODIUM SERPL-SCNC: 138 MMOL/L — SIGNIFICANT CHANGE UP (ref 135–145)
SODIUM SERPL-SCNC: 139 MMOL/L — SIGNIFICANT CHANGE UP (ref 135–145)
WBC # BLD: 10.5 K/UL — SIGNIFICANT CHANGE UP (ref 3.8–10.5)
WBC # BLD: 10.72 K/UL — HIGH (ref 3.8–10.5)
WBC # FLD AUTO: 10.5 K/UL — SIGNIFICANT CHANGE UP (ref 3.8–10.5)
WBC # FLD AUTO: 10.72 K/UL — HIGH (ref 3.8–10.5)

## 2025-05-30 PROCEDURE — 99221 1ST HOSP IP/OBS SF/LOW 40: CPT

## 2025-05-30 RX ORDER — FENTANYL CITRATE-0.9 % NACL/PF 100MCG/2ML
50 SYRINGE (ML) INTRAVENOUS
Refills: 0 | Status: DISCONTINUED | OUTPATIENT
Start: 2025-05-30 | End: 2025-05-30

## 2025-05-30 RX ORDER — SENNA 187 MG
2 TABLET ORAL AT BEDTIME
Refills: 0 | Status: DISCONTINUED | OUTPATIENT
Start: 2025-05-30 | End: 2025-06-03

## 2025-05-30 RX ORDER — HYDROMORPHONE/SOD CHLOR,ISO/PF 2 MG/10 ML
0.5 SYRINGE (ML) INJECTION
Refills: 0 | Status: DISCONTINUED | OUTPATIENT
Start: 2025-05-30 | End: 2025-05-30

## 2025-05-30 RX ORDER — SODIUM CHLORIDE 9 G/1000ML
1000 INJECTION, SOLUTION INTRAVENOUS ONCE
Refills: 0 | Status: COMPLETED | OUTPATIENT
Start: 2025-05-30 | End: 2025-05-30

## 2025-05-30 RX ORDER — HEPARIN SODIUM 1000 [USP'U]/ML
800 INJECTION INTRAVENOUS; SUBCUTANEOUS
Qty: 25000 | Refills: 0 | Status: DISCONTINUED | OUTPATIENT
Start: 2025-05-30 | End: 2025-05-31

## 2025-05-30 RX ORDER — POLYETHYLENE GLYCOL 3350 17 G/17G
17 POWDER, FOR SOLUTION ORAL DAILY
Refills: 0 | Status: DISCONTINUED | OUTPATIENT
Start: 2025-05-30 | End: 2025-06-03

## 2025-05-30 RX ORDER — POTASSIUM PHOSPHATE, MONOBASIC POTASSIUM PHOSPHATE, DIBASIC INJECTION, 236; 224 MG/ML; MG/ML
30 SOLUTION, CONCENTRATE INTRAVENOUS ONCE
Refills: 0 | Status: COMPLETED | OUTPATIENT
Start: 2025-05-30 | End: 2025-05-30

## 2025-05-30 RX ORDER — SODIUM CHLORIDE 9 G/1000ML
1000 INJECTION, SOLUTION INTRAVENOUS
Refills: 0 | Status: DISCONTINUED | OUTPATIENT
Start: 2025-05-30 | End: 2025-05-30

## 2025-05-30 RX ADMIN — ATORVASTATIN CALCIUM 80 MILLIGRAM(S): 80 TABLET, FILM COATED ORAL at 21:42

## 2025-05-30 RX ADMIN — HEPARIN SODIUM 8 UNIT(S)/HR: 1000 INJECTION INTRAVENOUS; SUBCUTANEOUS at 19:32

## 2025-05-30 RX ADMIN — HEPARIN SODIUM 8 UNIT(S)/HR: 1000 INJECTION INTRAVENOUS; SUBCUTANEOUS at 21:52

## 2025-05-30 RX ADMIN — POTASSIUM PHOSPHATE, MONOBASIC POTASSIUM PHOSPHATE, DIBASIC INJECTION, 83.33 MILLIMOLE(S): 236; 224 SOLUTION, CONCENTRATE INTRAVENOUS at 13:46

## 2025-05-30 RX ADMIN — CLOPIDOGREL BISULFATE 75 MILLIGRAM(S): 75 TABLET, FILM COATED ORAL at 21:41

## 2025-05-30 RX ADMIN — SODIUM CHLORIDE 1000 MILLILITER(S): 9 INJECTION, SOLUTION INTRAVENOUS at 05:09

## 2025-05-30 RX ADMIN — HEPARIN SODIUM 8 UNIT(S)/HR: 1000 INJECTION INTRAVENOUS; SUBCUTANEOUS at 04:18

## 2025-05-30 RX ADMIN — SODIUM CHLORIDE 75 MILLILITER(S): 9 INJECTION, SOLUTION INTRAVENOUS at 04:19

## 2025-05-30 NOTE — CONSULT NOTE ADULT - ASSESSMENT
ASSESSMENT: 64y male w/ PMHx of EVAR with subsequent explantation and aortic reconstruction due to infection, fem-pop bypass, CFA aneurysm repair as well as multiple other vascular surgical interventions was found with sami 2b ischemia after a cardiac cath    Admitting Dx: Cardiac Cath   Complicated By: Femoral Artery Pseudoaneurysm  PMHx:    PLAN:    Neuro:   - Multimodal pain regimen w/ fentanyl and diladud, tylenol  - Continue Melatonin 3mg qhs  - Delirium precautions  - Optimize sleep/wake cycle     CV:   - Maintain MAP > 65  - Continue hemodynamic monitoring  - Continue Metoprolol Succinate 100mg QD  - Continue Furosemide 40mg QD  - Continue Atorvastatin 80mg QD  - Will Restart Imdur, Entresto, and GDMT therapies once clinically appropriate  - Neosynephrine off post-operatively   - TTE      Pulm:   - Maintain sPO2 >92%  - Pulmonary toilet, IS, OOBTC    GI/Nutrition:   - NPO  - Bowel regimen  - Monitor bowel movements    /Renal:   - Monitor kidney fxn  - Monitor UOP  - Replete electrolytes PRN (Mg >2, Phos >3, K >4)  - Mcdowell for strict I&O - will attempt TOV 5/30    ID:  - Monitor fever curve  - Monitor for leukocytosis    Endo:  - Monitor blood glucose  - Maintain euglycemia, 140-180    Heme/DVT Prophylaxis:  - SCDs  - Monitor H/H  - Chemical prophylaxis with heparin gtt @ 800u  - Continue Aspirin + Plavix per vascular    Skin:  - Repositioning for DTI prevention while in bed    Vascular:  - Neurovascular checks Q1  - R AT Palpable (No DP/PT)  - L DP/PT Palpable   - Appreciate vascular recs    Lines:  - Peripheral IV's  - L Arterial Line    Dispo:  - Care per SICU    CODE STATUS: full code

## 2025-05-30 NOTE — PROGRESS NOTE ADULT - ASSESSMENT
63yo here for NSTEMI, medically and vascular surgically complex male with sami 2b ischemia of RLE after a cardiac cath with bilateral femoral access, subsequently aborted after difficulty advancing wire. Found with PSA of right CFA and occlusive thrombus of R Fem AT bypass. Now s/p right thigh and groin exploration, excision of PSA, endarterectomy of CFA and Profunda, and interposition bypass CFA to Graft with side anastomosis of profunda to PTFE as well. This AM feels fine, foot perfusing and neurologically improved.     - Cardiac Recs / work-up for NSTEMI appreciated  - May advance diet   - Heparin gtt / Plavix / ASA  - q1 Neurovasc checks  - Care per SICU appreciated

## 2025-05-30 NOTE — CONSULT NOTE ADULT - SUBJECTIVE AND OBJECTIVE BOX
SICU Consultation Note  =====================================================  HPI: 64y old Male who presents with a chief complaint of acute decompensated HF and s/p C 5/27/25 which revealed Severe ramus and borderline LAD disease; Non dominant RCA disease.   Now s/p unsuccessful attempt at access via bilateral femoral arteries for staged PCI by Dr. Escalera: Procedure aborted secondary to no access obtained; post procedure c/b loss of Right PP. vascular surgery consulted due to concern of cold right leg after cath.    SICU Consult: q1 nv     Surgery Information  OR Date: 5/29 - 5/30  Procedure: R groin exploration resection of R femoral aneurysm CFA endarterectomy, profunda endarterectomy, interposition bypass with PTFE from CFA to proximal bypass, thromboembolectomy of previous fem to AT bypass, anastomosis of origin of profunda to bypass. additional cutdown to R lower thigh at bypass w/ further thromboembolectomy     EBL: 450       UOP:   550           IV Fluids:   2500    Blood Products:  1u prbc            PAST MEDICAL & SURGICAL HISTORY:  Hypertension  Hypercholesterolemia  AAA (abdominal aortic aneurysm)  Alcoholism /alcohol abuse  Last drink 2010  Aneurysm artery, femoral  left  Coronary artery disease involving native coronary artery of native heart without angina pectoris  PCI at [facility]      AAA (abdominal aortic aneurysm)  Endurant II Abdominal l Stent Med[employer]onic Graft Implanted      History of extremity bypass graft  Right      H/O inguinal hernia  x2      S/P appendectomy      Abdominal adhesions  Around small bowel      H/O cardiac catheterization        Home Meds:   Allergies: No Known Allergies    Soc:   Advanced Directives: Presumed Full Code     CURRENT MEDICATIONS:   --------------------------------------------------------------------------------------  MEDICATIONS  (STANDING):  aspirin  chewable 81 milliGRAM(s) Oral daily  atorvastatin 80 milliGRAM(s) Oral at bedtime  clopidogrel Tablet 75 milliGRAM(s) Oral daily  fluticasone propionate/ salmeterol 250-50 MICROgram(s) Diskus 1 Dose(s) Inhalation two times a day  furosemide    Tablet 40 milliGRAM(s) Oral daily  heparin  Infusion 800 Unit(s)/Hr (8 mL/Hr) IV Continuous <Continuous>  lactated ringers. 1000 milliLiter(s) (75 mL/Hr) IV Continuous <Continuous>  metoprolol succinate  milliGRAM(s) Oral daily    MEDICATIONS  (PRN):  acetaminophen     Tablet .. 650 milliGRAM(s) Oral every 6 hours PRN Temp greater or equal to 38C (100.4F), Mild Pain (1 - 3)  albuterol/ipratropium for Nebulization 3 milliLiter(s) Nebulizer every 6 hours PRN Shortness of Breath and/or Wheezing  aluminum hydroxide/magnesium hydroxide/simethicone Suspension 30 milliLiter(s) Oral every 4 hours PRN Dyspepsia  fentaNYL    Injectable 50 MICROGram(s) IV Push every 5 minutes PRN Severe Pain (7 - 10)  HYDROmorphone  Injectable 0.5 milliGRAM(s) IV Push every 10 minutes PRN Moderate Pain (4 - 6)  melatonin 3 milliGRAM(s) Oral at bedtime PRN Insomnia  ondansetron Injectable 4 milliGRAM(s) IV Push every 8 hours PRN Nausea and/or Vomiting    --------------------------------------------------------------------------------------    VITAL SIGNS, INS/OUTS     ICU Vital Signs Last 24 Hrs  T(C): 36.4 (29 May 2025 20:57), Max: 36.8 (29 May 2025 15:33)  T(F): 97.5 (29 May 2025 20:57), Max: 98.2 (29 May 2025 15:33)  HR: 68 (29 May 2025 20:57) (60 - 78)  BP: 189/88 (29 May 2025 20:57) (117/69 - 213/78)  BP(mean): --  ABP: --  ABP(mean): --  RR: 10 (29 May 2025 20:57) (10 - 18)  SpO2: 98% (29 May 2025 20:57) (95% - 100%)    O2 Parameters below as of 29 May 2025 20:57  Patient On (Oxygen Delivery Method): room air            I&O's Detail    28 May 2025 07:01  -  29 May 2025 07:00  --------------------------------------------------------  IN:    Heparin: 174 mL    Oral Fluid: 340 mL  Total IN: 514 mL    OUT:    Voided (mL): 600 mL  Total OUT: 600 mL    Total NET: -86 mL          ABG - ( 30 May 2025 03:07 )  pH, Arterial: 7.300 pH, Blood: x     /  pCO2: 43    /  pO2: 227   / HCO3: 21    / Base Excess: -5.2  /  SaO2: 99.3                --------------------------------------------------------------------------------------    Physical Exam:    Gen: Resting comfortably in bed    HEENT: PERRL, EOMI    Neurological: Alert and oriented x3 without focal deficit    Neck: Trachea midline, no evidence of JVD    Pulmonary: CTA B/L,  equal rise and fall of the chest    Cardiovascular: regular rate and rhythm    Gastrointestinal: Soft, non-tender, non-distended    : Mcdowell in place draining clear yellow urine    Vascular / Skin: Intact, Palpable AT     LABS:  CBC Full  -  ( 30 May 2025 04:00 )  WBC Count : 10.72 K/uL  RBC Count : 3.73 M/uL  Hemoglobin : 8.9 g/dL  Hematocrit : 29.9 %  Platelet Count - Automated : 253 K/uL  Mean Cell Volume : 80.2 fl  Mean Cell Hemoglobin : 23.9 pg  Mean Cell Hemoglobin Concentration : 29.8 g/dL  Auto Neutrophil # : x  Auto Lymphocyte # : x  Auto Monocyte # : x  Auto Eosinophil # : x  Auto Basophil # : x  Auto Neutrophil % : x  Auto Lymphocyte % : x  Auto Monocyte % : x  Auto Eosinophil % : x  Auto Basophil % : x    05-29    140  |  105  |  30.3[H]  ----------------------------<  103[H]  3.7   |  21.0[L]  |  1.09    Ca    8.9      29 May 2025 04:09  Phos  3.4     05-29  Mg     2.2     05-29      PTT - ( 29 May 2025 13:35 )  PTT:52.9 sec  Urinalysis Basic - ( 29 May 2025 04:09 )    Color: x / Appearance: x / SG: x / pH: x  Gluc: 103 mg/dL / Ketone: x  / Bili: x / Urobili: x   Blood: x / Protein: x / Nitrite: x   Leuk Esterase: x / RBC: x / WBC x   Sq Epi: x / Non Sq Epi: x / Bacteria: x      RECENT CULTURES:  05-24 Clean Catch Clean Catch (Midstream) XXXX XXXX   <10,000 CFU/mL Normal Urogenital Monica

## 2025-05-30 NOTE — PROGRESS NOTE ADULT - SUBJECTIVE AND OBJECTIVE BOX
PROGRESS NOTE / POST-OP CHECK    HPI/OVERNIGHT EVENTS:  Seen this morning, comfortable in PACU. Reports pain is controlled. No reports of nausea, vomiting, fever, chills, chest pain, shortness of breath, or any new or concerning symptoms. Right foot with motor and sensation improved.     MEDICATIONS  (STANDING):  aspirin  chewable 81 milliGRAM(s) Oral daily  atorvastatin 80 milliGRAM(s) Oral at bedtime  clopidogrel Tablet 75 milliGRAM(s) Oral daily  fluticasone propionate/ salmeterol 250-50 MICROgram(s) Diskus 1 Dose(s) Inhalation two times a day  furosemide    Tablet 40 milliGRAM(s) Oral daily  heparin  Infusion 800 Unit(s)/Hr (8 mL/Hr) IV Continuous <Continuous>  lactated ringers. 1000 milliLiter(s) (75 mL/Hr) IV Continuous <Continuous>  metoprolol succinate  milliGRAM(s) Oral daily  polyethylene glycol 3350 17 Gram(s) Oral daily  senna 2 Tablet(s) Oral at bedtime    MEDICATIONS  (PRN):  albuterol/ipratropium for Nebulization 3 milliLiter(s) Nebulizer every 6 hours PRN Shortness of Breath and/or Wheezing  aluminum hydroxide/magnesium hydroxide/simethicone Suspension 30 milliLiter(s) Oral every 4 hours PRN Dyspepsia  HYDROmorphone  Injectable 0.5 milliGRAM(s) IV Push every 10 minutes PRN Moderate Pain (4 - 6)  melatonin 3 milliGRAM(s) Oral at bedtime PRN Insomnia  ondansetron Injectable 4 milliGRAM(s) IV Push every 8 hours PRN Nausea and/or Vomiting      Vital Signs Last 24 Hrs  T(C): 36.3 (30 May 2025 08:02), Max: 36.8 (29 May 2025 15:33)  T(F): 97.4 (30 May 2025 08:02), Max: 98.2 (29 May 2025 15:33)  HR: 70 (30 May 2025 08:02) (60 - 78)  BP: 131/50 (30 May 2025 08:02) (96/55 - 213/78)  BP(mean): 74 (30 May 2025 08:02) (64 - 87)  RR: 13 (30 May 2025 08:02) (10 - 19)  SpO2: 99% (30 May 2025 08:02) (94% - 100%)    Parameters below as of 30 May 2025 08:02  Patient On (Oxygen Delivery Method): room air        Constitutional: Patient laying comfortably in bed, in no acute distress  HEENT: No active drainage or redness  Neck: Full ROM without pain  Respiratory: Respirations are unlabored, no accessory muscle use, no conversational dyspnea  Cardiovascular: Regular rate  Gastrointestinal: Abdomen soft, non-tender, non-distended  Neurological: No gross sensory / motor / coordination deficits  Extremities: R groin dressing clean and intact, no underlying swelling or ecchymosis. Right foot perfusing, DP signal        I&O's Detail    29 May 2025 07:01  -  30 May 2025 07:00  --------------------------------------------------------  IN:    Heparin: 32 mL    Lactated Ringers: 225 mL    Lactated Ringers Bolus: 1000 mL  Total IN: 1257 mL    OUT:    Indwelling Catheter - Urethral (mL): 600 mL  Total OUT: 600 mL    Total NET: 657 mL          LABS:                        8.9    10.72 )-----------( 253      ( 30 May 2025 04:00 )             29.9     05-30    139  |  106  |  27.3[H]  ----------------------------<  130[H]  4.2   |  18.0[L]  |  1.45[H]    Ca    7.7[L]      30 May 2025 04:00  Phos  4.7     05-30  Mg     2.0     05-30      PT/INR - ( 30 May 2025 04:00 )   PT: 14.1 sec;   INR: 1.25 ratio         PTT - ( 30 May 2025 04:00 )  PTT:94.9 sec  Urinalysis Basic - ( 30 May 2025 04:00 )    Color: x / Appearance: x / SG: x / pH: x  Gluc: 130 mg/dL / Ketone: x  / Bili: x / Urobili: x   Blood: x / Protein: x / Nitrite: x   Leuk Esterase: x / RBC: x / WBC x   Sq Epi: x / Non Sq Epi: x / Bacteria: x

## 2025-05-30 NOTE — BRIEF OPERATIVE NOTE - OPERATION/FINDINGS
[place] 2B, thrombosed FEM-AT bypass on R Leg, R groin exploration   Resection of CFA aneurysm, CFA endarterectomy at origin, profunda endarterectomy at origin  Interposition bypass of R CFA to previous proximal portion of R FEM-AT bypass using PTFE, end to side profunda anastomosis  Thromboembolectomy of previous FEM-AT bypass, cutdown at mid thigh over previous R FEM-AT bypass for further thromboembolectomy and clamping of the vessel    [place] 2B, thrombosed FEM-AT bypass on R Leg, R groin exploration   Resection of CFA aneurysm, CFA endarterectomy at origin, profunda endarterectomy at origin  Interposition bypass of R CFA to previous proximal portion of R FEM-AT bypass using PTFE, end to side profunda anastomosis  Thromboembolectomy of previous FEM-AT bypass, cutdown at mid thigh over previous R FEM-AT bypass for further thromboembolectomy and clamping of the vessel   palpable AT pulse

## 2025-05-30 NOTE — CONSULT NOTE ADULT - NS ATTEND AMEND GEN_ALL_CORE FT
Patient seen and examined at bedside. Patient is  S/P CFA endarterectomy, profunda endarterectomy, interposition bypass with PTFE from CFA to proximal bypass, thromboembolectomy of previous fem to AT bypass    Admit to SICU for Q1 neurochecks  NPO  IV fluids  Heparin drip  POCUS bedside  Q1 neurovascular checks   multimodal pain regimen Patient seen and examined at bedside. Patient is  S/P CFA endarterectomy, profunda endarterectomy, interposition bypass with PTFE from CFA to proximal bypass, thromboembolectomy of previous fem to AT bypass    Admit to SICU for Q1 neurovascular checks  NPO  IV fluids  Heparin drip  POCUS bedside  Q1 neurovascular checks   multimodal pain regimen

## 2025-05-30 NOTE — BRIEF OPERATIVE NOTE - NSICDXBRIEFPROCEDURE_GEN_ALL_CORE_FT
PROCEDURES:  Revision of femoropopliteal arterial bypass, using polytetrafluoroethylene (PTFE) graft 30-May-2025 04:03:11 [place] 2B, thrombosed FEM-AT bypass on R Leg, R groin exploration   Resection of CFA aneurysm, CFA endarterectomy at origin, profunda endarterectomy at origin  Interposition bypass of R CFA to previous proximal portion of R FEM-AT bypass using PTFE, end to side profunda anastomosis  Thromboembolectomy of previous FEM-AT bypass, cutdown at mid thigh over previous R FEM-AT bypass for further thromboembolectomy and clamping of the vessel Donell Hernandez

## 2025-05-30 NOTE — CHART NOTE - NSCHARTNOTEFT_GEN_A_CORE
Interventional Cardiology Post Cath Progress Note:                Subjective:   Patient feels well- no current complaints- Denies chest pain, shortness of breath. Denies pain, numbness, tingling or swelling around (groin/wrist) access site    Tele 24hrs:      MEDICATIONS  (STANDING):  aspirin  chewable 81 milliGRAM(s) Oral daily  atorvastatin 80 milliGRAM(s) Oral at bedtime  clopidogrel Tablet 75 milliGRAM(s) Oral daily  fluticasone propionate/ salmeterol 250-50 MICROgram(s) Diskus 1 Dose(s) Inhalation two times a day  furosemide    Tablet 40 milliGRAM(s) Oral daily  heparin  Infusion 800 Unit(s)/Hr (8 mL/Hr) IV Continuous <Continuous>  metoprolol succinate  milliGRAM(s) Oral daily  polyethylene glycol 3350 17 Gram(s) Oral daily  senna 2 Tablet(s) Oral at bedtime    MEDICATIONS  (PRN):  albuterol/ipratropium for Nebulization 3 milliLiter(s) Nebulizer every 6 hours PRN Shortness of Breath and/or Wheezing  aluminum hydroxide/magnesium hydroxide/simethicone Suspension 30 milliLiter(s) Oral every 4 hours PRN Dyspepsia  HYDROmorphone  Injectable 0.5 milliGRAM(s) IV Push every 10 minutes PRN Moderate Pain (4 - 6)  melatonin 3 milliGRAM(s) Oral at bedtime PRN Insomnia  ondansetron Injectable 4 milliGRAM(s) IV Push every 8 hours PRN Nausea and/or Vomiting      Objective:  Vital Signs Last 24 Hrs  T(C): 36.3 (30 May 2025 08:02), Max: 36.8 (29 May 2025 15:33)  T(F): 97.4 (30 May 2025 08:02), Max: 98.2 (29 May 2025 15:33)  HR: 62 (30 May 2025 14:00) (60 - 80)  BP: 151/62 (30 May 2025 14:00) (96/55 - 213/78)  BP(mean): 82 (30 May 2025 14:00) (64 - 87)  RR: 16 (30 May 2025 14:00) (10 - 19)  SpO2: 98% (30 May 2025 14:00) (94% - 100%)    CATH RESULTS:    Physical Exam:  No apparent distress, alert and oriented times three, appropriate affect  Bilateral Groin sites stable   doppler pulses on R Tibial  R pedal        Assessment/Plan:    63 y/o M w/ PMH of COPD (not on home O2), active smoking (2ppd for min of 40pck years), HTN, HLD, CAD s/p PCI, AAA s/p repair, remote hx of etoh use disorder (last drink 2010), HFmrEF (EF 45-50% 2016), psoriasis, PVD s/p RLE bypass (follows w/ vascular) presented to ED overnight c/o substernal chest pressure w/ associated Rt arm pain.  Pt states it woke him up from his sleep.   Post LHC unsuccessful Bilateral Groin access . R Fa required femoral cut down and vasular intervention with Dr Yanez .        - Procedure site stable.   - Continue current medications  MEDICATIONS  (STANDING):  aspirin  chewable 81 milliGRAM(s) Oral daily  atorvastatin 80 milliGRAM(s) Oral at bedtime  clopidogrel Tablet 75 milliGRAM(s) Oral daily  fluticasone propionate/ salmeterol 250-50 MICROgram(s) Diskus 1 Dose(s) Inhalation two times a day  furosemide    Tablet 40 milliGRAM(s) Oral daily  heparin  Infusion 800 Unit(s)/Hr (8 mL/Hr) IV Continuous <Continuous>  metoprolol succinate  milliGRAM(s) Oral daily  polyethylene glycol 3350 17 Gram(s) Oral daily  senna 2 Tablet(s) Oral at bedtime  - Recommend a heart healthy diet which includes a variety of fruits and vegetables, whole grains, low fat dairy products, legumes and skinless poulty and fish; food prepared with little or no salt and minimize processed foods  - Avoid using NSAIDs  (Aleve, Motrin, ibuprofen, naproxen) while on DAPT, please utilize Tylenol for pain control (not to exceed 4gm in 24 hours)  -Follow up with primary cardiologist in 1-2 weeks post hospitalization   -Please make sure DAPT is prescribed to pt's preferred pharmacy on discharge  -Keep K>4 Mg>2

## 2025-05-30 NOTE — PROGRESS NOTE ADULT - SUBJECTIVE AND OBJECTIVE BOX
[facility]                                   [address] [place] [address]                                                      PHONE: [phone]                                                         FAX: [phone]  [email or web address]  ---------------------------------------------------------------------------------------------------------------------------------    Overnight events/patient complaints: patient seen at bedside. He's feeling better this morning.       No Known Allergies    MEDICATIONS  (STANDING):  aspirin  chewable 81 milliGRAM(s) Oral daily  atorvastatin 80 milliGRAM(s) Oral at bedtime  clopidogrel Tablet 75 milliGRAM(s) Oral daily  fluticasone propionate/ salmeterol 250-50 MICROgram(s) Diskus 1 Dose(s) Inhalation two times a day  furosemide    Tablet 40 milliGRAM(s) Oral daily  heparin  Infusion 800 Unit(s)/Hr (8 mL/Hr) IV Continuous <Continuous>  lactated ringers. 1000 milliLiter(s) (75 mL/Hr) IV Continuous <Continuous>  metoprolol succinate  milliGRAM(s) Oral daily  polyethylene glycol 3350 17 Gram(s) Oral daily  senna 2 Tablet(s) Oral at bedtime    MEDICATIONS  (PRN):  albuterol/ipratropium for Nebulization 3 milliLiter(s) Nebulizer every 6 hours PRN Shortness of Breath and/or Wheezing  aluminum hydroxide/magnesium hydroxide/simethicone Suspension 30 milliLiter(s) Oral every 4 hours PRN Dyspepsia  HYDROmorphone  Injectable 0.5 milliGRAM(s) IV Push every 10 minutes PRN Moderate Pain (4 - 6)  melatonin 3 milliGRAM(s) Oral at bedtime PRN Insomnia  ondansetron Injectable 4 milliGRAM(s) IV Push every 8 hours PRN Nausea and/or Vomiting      Vital Signs Last 24 Hrs  T(C): 36.3 (30 May 2025 08:02), Max: 36.8 (29 May 2025 15:33)  T(F): 97.4 (30 May 2025 08:02), Max: 98.2 (29 May 2025 15:33)  HR: 68 (30 May 2025 09:11) (60 - 78)  BP: 124/57 (30 May 2025 09:11) (96/55 - 213/78)  BP(mean): 78 (30 May 2025 09:11) (64 - 87)  RR: 14 (30 May 2025 09:11) (10 - 19)  SpO2: 98% (30 May 2025 09:11) (94% - 100%)    Parameters below as of 30 May 2025 08:02  Patient On (Oxygen Delivery Method): room air      ICU Vital Signs Last 24 Hrs  JULIO CESAR JONES  I&O's Detail    29 May 2025 07:01  -  30 May 2025 07:00  --------------------------------------------------------  IN:    Heparin: 32 mL    Lactated Ringers: 225 mL    Lactated Ringers Bolus: 1000 mL  Total IN: 1257 mL    OUT:    Indwelling Catheter - Urethral (mL): 600 mL  Total OUT: 600 mL    Total NET: 657 mL      30 May 2025 07:01  -  30 May 2025 10:15  --------------------------------------------------------  IN:    Heparin: 16 mL    Lactated Ringers: 150 mL  Total IN: 166 mL    OUT:    Indwelling Catheter - Urethral (mL): 200 mL  Total OUT: 200 mL    Total NET: -34 mL        Drug Dosing Weight  JULIO CESAR JONES      PHYSICAL EXAM:  General: NAD  HEENT: Head; normocephalic, atraumatic.  Eyes: Pupils reactive, cornea wnl.  Neck: Supple, no nodes adenopathy, no NVD or carotid bruit or thyromegaly.  CARDIOVASCULAR: Normal S1 and S2, No murmur, rub, gallop or lift.   LUNGS: No rales, rhonchi or wheeze. Normal breath sounds bilaterally.  ABDOMEN: Soft, nontender without mass or organomegaly. bowel sounds normoactive.  EXTREMITIES: No clubbing, cyanosis or edema. Distal pulses wnl.   SKIN: warm and dry with normal turgor.  NEURO: Alert/oriented x 3  PSYCH: normal affect.        LABS:                        8.9    10.72 )-----------( 253      ( 30 May 2025 04:00 )             29.9     05-30    139  |  106  |  27.3[H]  ----------------------------<  130[H]  4.2   |  18.0[L]  |  1.45[H]    Ca    7.7[L]      30 May 2025 04:00  Phos  4.7     05-30  Mg     2.0     05-30      JULIO CESAR JONES      PT/INR - ( 30 May 2025 04:00 )   PT: 14.1 sec;   INR: 1.25 ratio         PTT - ( 30 May 2025 04:00 )  PTT:94.9 sec  Urinalysis Basic - ( 30 May 2025 04:00 )    Color: x / Appearance: x / SG: x / pH: x  Gluc: 130 mg/dL / Ketone: x  / Bili: x / Urobili: x   Blood: x / Protein: x / Nitrite: x   Leuk Esterase: x / RBC: x / WBC x   Sq Epi: x / Non Sq Epi: x / Bacteria: x        RADIOLOGY & ADDITIONAL STUDIES:    INTERPRETATION OF TELEMETRY (personally reviewed): sinus rhythm       ASSESSMENT AND PLAN:   64y old Male who presents with a chief complaint of acute decompensated HF and s/p C 5/27/25 which revealed Severe ramus and borderline LAD disease; Non dominant RCA disease.   Now s/p unsuccessful attempt at access via bilateral femoral arteries for staged PCI by Dr. Escalera: Procedure aborted secondary to no access obtained; post procedure c/b loss of Right leg pedal pulse. now s/p urgent  R groin exploration resection of R femoral aneurysm CFA endarterectomy, profunda endarterectomy, interposition bypass with PTFE from CFA to proximal bypass, thromboembolectomy of previous fem to AT bypass, anastomosis of origin of profunda to bypass. additional cutdown to R lower thigh at bypass w/ further thromboembolectomy.    - continue Heparin infusion   - ASA 81, Plavix 75, and high dose statin daily  - Toprol  mg daily  - no plan for further coronary intervention at this time per Dr. Escalera    Will follow closely with you.

## 2025-05-31 LAB
ANION GAP SERPL CALC-SCNC: 13 MMOL/L — SIGNIFICANT CHANGE UP (ref 5–17)
APTT BLD: 30.4 SEC — SIGNIFICANT CHANGE UP (ref 26.1–36.8)
BASOPHILS # BLD AUTO: 0.04 K/UL — SIGNIFICANT CHANGE UP (ref 0–0.2)
BASOPHILS NFR BLD AUTO: 0.5 % — SIGNIFICANT CHANGE UP (ref 0–2)
BLD GP AB SCN SERPL QL: SIGNIFICANT CHANGE UP
BUN SERPL-MCNC: 12.3 MG/DL — SIGNIFICANT CHANGE UP (ref 8–20)
CALCIUM SERPL-MCNC: 8 MG/DL — LOW (ref 8.4–10.5)
CHLORIDE SERPL-SCNC: 107 MMOL/L — SIGNIFICANT CHANGE UP (ref 96–108)
CO2 SERPL-SCNC: 18 MMOL/L — LOW (ref 22–29)
CREAT SERPL-MCNC: 0.85 MG/DL — SIGNIFICANT CHANGE UP (ref 0.5–1.3)
EGFR: 97 ML/MIN/1.73M2 — SIGNIFICANT CHANGE UP
EGFR: 97 ML/MIN/1.73M2 — SIGNIFICANT CHANGE UP
EOSINOPHIL # BLD AUTO: 0.12 K/UL — SIGNIFICANT CHANGE UP (ref 0–0.5)
EOSINOPHIL NFR BLD AUTO: 1.5 % — SIGNIFICANT CHANGE UP (ref 0–6)
GLUCOSE SERPL-MCNC: 89 MG/DL — SIGNIFICANT CHANGE UP (ref 70–99)
HCT VFR BLD CALC: 27.9 % — LOW (ref 39–50)
HGB BLD-MCNC: 8 G/DL — LOW (ref 13–17)
IMM GRANULOCYTES # BLD AUTO: 0.03 K/UL — SIGNIFICANT CHANGE UP (ref 0–0.07)
IMM GRANULOCYTES NFR BLD AUTO: 0.4 % — SIGNIFICANT CHANGE UP (ref 0–0.9)
LYMPHOCYTES # BLD AUTO: 1.17 K/UL — SIGNIFICANT CHANGE UP (ref 1–3.3)
LYMPHOCYTES NFR BLD AUTO: 14.9 % — SIGNIFICANT CHANGE UP (ref 13–44)
MAGNESIUM SERPL-MCNC: 1.9 MG/DL — SIGNIFICANT CHANGE UP (ref 1.6–2.6)
MCHC RBC-ENTMCNC: 24 PG — LOW (ref 27–34)
MCHC RBC-ENTMCNC: 28.7 G/DL — LOW (ref 32–36)
MCV RBC AUTO: 83.8 FL — SIGNIFICANT CHANGE UP (ref 80–100)
MONOCYTES # BLD AUTO: 0.93 K/UL — HIGH (ref 0–0.9)
MONOCYTES NFR BLD AUTO: 11.8 % — SIGNIFICANT CHANGE UP (ref 2–14)
MRSA PCR RESULT.: SIGNIFICANT CHANGE UP
NEUTROPHILS # BLD AUTO: 5.57 K/UL — SIGNIFICANT CHANGE UP (ref 1.8–7.4)
NEUTROPHILS NFR BLD AUTO: 70.9 % — SIGNIFICANT CHANGE UP (ref 43–77)
NRBC # BLD AUTO: 0 K/UL — SIGNIFICANT CHANGE UP (ref 0–0)
NRBC # FLD: 0 K/UL — SIGNIFICANT CHANGE UP (ref 0–0)
NRBC BLD AUTO-RTO: 0 /100 WBCS — SIGNIFICANT CHANGE UP (ref 0–0)
PHOSPHATE SERPL-MCNC: 2.2 MG/DL — LOW (ref 2.4–4.7)
PLATELET # BLD AUTO: 174 K/UL — SIGNIFICANT CHANGE UP (ref 150–400)
PMV BLD: 11.5 FL — SIGNIFICANT CHANGE UP (ref 7–13)
POTASSIUM SERPL-MCNC: 3.8 MMOL/L — SIGNIFICANT CHANGE UP (ref 3.5–5.3)
POTASSIUM SERPL-SCNC: 3.8 MMOL/L — SIGNIFICANT CHANGE UP (ref 3.5–5.3)
RBC # BLD: 3.33 M/UL — LOW (ref 4.2–5.8)
RBC # FLD: 18.3 % — HIGH (ref 10.3–14.5)
S AUREUS DNA NOSE QL NAA+PROBE: DETECTED
SODIUM SERPL-SCNC: 138 MMOL/L — SIGNIFICANT CHANGE UP (ref 135–145)
WBC # BLD: 7.86 K/UL — SIGNIFICANT CHANGE UP (ref 3.8–10.5)
WBC # FLD AUTO: 7.86 K/UL — SIGNIFICANT CHANGE UP (ref 3.8–10.5)

## 2025-05-31 PROCEDURE — 99233 SBSQ HOSP IP/OBS HIGH 50: CPT

## 2025-05-31 RX ORDER — MAGNESIUM SULFATE 500 MG/ML
1 SYRINGE (ML) INJECTION ONCE
Refills: 0 | Status: COMPLETED | OUTPATIENT
Start: 2025-05-31 | End: 2025-05-31

## 2025-05-31 RX ORDER — POTASSIUM PHOSPHATE, MONOBASIC POTASSIUM PHOSPHATE, DIBASIC INJECTION, 236; 224 MG/ML; MG/ML
15 SOLUTION, CONCENTRATE INTRAVENOUS ONCE
Refills: 0 | Status: COMPLETED | OUTPATIENT
Start: 2025-05-31 | End: 2025-05-31

## 2025-05-31 RX ORDER — SOD PHOS DI, MONO/K PHOS MONO 250 MG
2 TABLET ORAL ONCE
Refills: 0 | Status: COMPLETED | OUTPATIENT
Start: 2025-05-31 | End: 2025-05-31

## 2025-05-31 RX ORDER — HEPARIN SODIUM 1000 [USP'U]/ML
800 INJECTION INTRAVENOUS; SUBCUTANEOUS
Qty: 25000 | Refills: 0 | Status: DISCONTINUED | OUTPATIENT
Start: 2025-05-31 | End: 2025-06-01

## 2025-05-31 RX ADMIN — FUROSEMIDE 40 MILLIGRAM(S): 10 INJECTION INTRAMUSCULAR; INTRAVENOUS at 05:22

## 2025-05-31 RX ADMIN — Medication 100 GRAM(S): at 06:04

## 2025-05-31 RX ADMIN — Medication 2 PACKET(S): at 18:13

## 2025-05-31 RX ADMIN — Medication 81 MILLIGRAM(S): at 11:41

## 2025-05-31 RX ADMIN — POTASSIUM PHOSPHATE, MONOBASIC POTASSIUM PHOSPHATE, DIBASIC INJECTION, 62.5 MILLIMOLE(S): 236; 224 SOLUTION, CONCENTRATE INTRAVENOUS at 06:04

## 2025-05-31 RX ADMIN — POLYETHYLENE GLYCOL 3350 17 GRAM(S): 17 POWDER, FOR SOLUTION ORAL at 11:40

## 2025-05-31 RX ADMIN — HEPARIN SODIUM 8 UNIT(S)/HR: 1000 INJECTION INTRAVENOUS; SUBCUTANEOUS at 05:25

## 2025-05-31 RX ADMIN — METOPROLOL SUCCINATE 100 MILLIGRAM(S): 50 TABLET, EXTENDED RELEASE ORAL at 05:22

## 2025-05-31 RX ADMIN — HEPARIN SODIUM 8 UNIT(S)/HR: 1000 INJECTION INTRAVENOUS; SUBCUTANEOUS at 19:23

## 2025-05-31 RX ADMIN — Medication 1 APPLICATION(S): at 11:42

## 2025-05-31 RX ADMIN — CLOPIDOGREL BISULFATE 75 MILLIGRAM(S): 75 TABLET, FILM COATED ORAL at 16:16

## 2025-05-31 RX ADMIN — ATORVASTATIN CALCIUM 80 MILLIGRAM(S): 80 TABLET, FILM COATED ORAL at 21:12

## 2025-05-31 RX ADMIN — Medication 2 TABLET(S): at 21:12

## 2025-05-31 RX ADMIN — HEPARIN SODIUM 8 UNIT(S)/HR: 1000 INJECTION INTRAVENOUS; SUBCUTANEOUS at 16:34

## 2025-05-31 NOTE — PROGRESS NOTE ADULT - ASSESSMENT
A/P     Assessment   63yo here for NSTEMI, medically and vascular surgically complex male with sami 2b ischemia of RLE after a cardiac cath with bilateral femoral access, subsequently aborted after difficulty advancing wire. Found with PSA of right CFA and occlusive thrombus of R Fem AT bypass. Now s/p right thigh and groin exploration, excision of PSA, endarterectomy of CFA and Profunda, and interposition bypass CFA to Graft with side anastomosis of profunda to PTFE as well. This AM feels fine, foot perfusing and neurologically improved.   Hgb stable ~8 and vitals stable     Plan:   - Okay to downgrade to tele  - SICU to remove A-line   - Mcdowell to stay for now   - Will order 1 unit of PRBC for hgb 8 in setting of cardiac condition  - OOB   - Pain control  - DASH diet   - Continue plavix and Heparin at800/hr,  will start FULL AC on 6/1   - Cardiology recs appreciated   - Contiue Q4 neurovasc checks

## 2025-05-31 NOTE — PROGRESS NOTE ADULT - SUBJECTIVE AND OBJECTIVE BOX
[facility]                              [address] [place] [address]                                                 PHONE: [phone]                                                 FAX: [phone]  -----------------------------------------------------------------------------------------------  Pt seen and examined. FU for CAD    Overnight events/Complaints: Pt without complains.    RELEVANT PHYSICAL EXAM:  Neck: No obvious JVD  Cardiovascular: regular S1, S2  Respiratory: Lungs clear to auscultation; no crepitations, no wheeze  Musculoskeletal: No edema    Vital Signs Last 24 Hrs  T(C): 36.9 (31 May 2025 08:03), Max: 37.8 (30 May 2025 19:24)  T(F): 98.4 (31 May 2025 08:03), Max: 100.1 (30 May 2025 19:24)  HR: 83 (31 May 2025 10:00) (62 - 83)  BP: 147/66 (31 May 2025 10:00) (113/60 - 155/51)  BP(mean): 87 (31 May 2025 10:00) (67 - 107)  RR: 18 (31 May 2025 10:00) (11 - 18)  SpO2: 99% (31 May 2025 10:00) (94% - 100%)    Parameters below as of 31 May 2025 10:00  Patient On (Oxygen Delivery Method): room air      I&O's Summary    30 May 2025 07:01  -  31 May 2025 07:00  --------------------------------------------------------  IN: 1189 mL / OUT: 2400 mL / NET: -1211 mL    31 May 2025 07:01  -  31 May 2025 11:00  --------------------------------------------------------  IN: 124 mL / OUT: 725 mL / NET: -601 mL            LABS:                        8.0    7.86  )-----------( 174      ( 31 May 2025 02:18 )             27.9    05-31    138  |  107  |  12.3  ----------------------------<  89  3.8   |  18.0[L]  |  0.85    Ca    8.0[L]      31 May 2025 02:18  Phos  2.2     05-31  Mg     1.9     05-31       PT/INR - ( 30 May 2025 04:00 )   PT: 14.1 sec;   INR: 1.25 ratio         PTT - ( 31 May 2025 02:18 )  PTT:30.4 sec    RADIOLOGY & ADDITIONAL STUDIES: (reviewed)  CXR was independently visualized/reviewed  and demonstrated: Mild interstitial edema    CARDIOLOGY TESTING:(reviewed)     TELEMETRY independently visualized/reviewed and demonstrated : NSR    12 lead EKG independently visualized/reviewed  and demonstrated sinus bradycardia    ECHOCARDIOGRAM independently visualized/reviewed and demonstrated :    1. Left ventricular systolic function is moderately decreased with an ejection fraction visually estimated at 35to 40 %.   2. Moderate mitral regurgitation.   3. Pulmonic valve was not well visualized.   4. No evidence of a patent foramen ovale by color flow Doppler.   5. Fibrocalcific aortic valve sclerosis without stenosis.   6. Trace tricuspid regurgitation.      CARDIAC CATHETERIZATION:5/29-unable to pass a wire thru either CFA due to severe PVD/aneurysmal disease ( discussed w/ pt's vasc surgeon/Dr West ) .   Also- R radial now occluded. Pt w severe LE pain and restlessness/procedure aborted.    MEDICATIONS:(reviewed)    MEDICATIONS  (PRN):  albuterol/ipratropium for Nebulization 3 milliLiter(s) Nebulizer every 6 hours PRN Shortness of Breath and/or Wheezing  aluminum hydroxide/magnesium hydroxide/simethicone Suspension 30 milliLiter(s) Oral every 4 hours PRN Dyspepsia  melatonin 3 milliGRAM(s) Oral at bedtime PRN Insomnia  ondansetron Injectable 4 milliGRAM(s) IV Push every 8 hours PRN Nausea and/or Vomiting      MEDICATIONS  (STANDING):  aspirin  chewable 81 milliGRAM(s) Oral daily  atorvastatin 80 milliGRAM(s) Oral at bedtime  clopidogrel Tablet 75 milliGRAM(s) Oral daily  fluticasone propionate/ salmeterol 250-50 MICROgram(s) Diskus 1 Dose(s) Inhalation two times a day  furosemide    Tablet 40 milliGRAM(s) Oral daily  heparin  Infusion 800 Unit(s)/Hr (8 mL/Hr) IV Continuous <Continuous>  metoprolol succinate  milliGRAM(s) Oral daily  polyethylene glycol 3350 17 Gram(s) Oral daily  senna 2 Tablet(s) Oral at bedtime    ASSESSMENT AND PLAN:     64y old Male who presents with a chief complaint of acute decompensated HF and s/p LHC 5/27/25 which revealed Severe ramus and borderline LAD disease; Non dominant RCA disease.   Now s/p unsuccessful attempt at access via bilateral femoral arteries for staged PCI by Dr. Escalera: Procedure aborted secondary to no access obtained; post procedure c/b loss of Right leg pedal pulse. now s/p urgent  R groin exploration resection of R femoral aneurysm CFA endarterectomy, profunda endarterectomy, interposition bypass with PTFE from CFA to proximal bypass, thromboembolectomy of previous fem to AT bypass, anastomosis of origin of profunda to bypass. additional cutdown to R lower thigh at bypass w/ further thromboembolectomy.    - continue Heparin infusion as per vascular surgery  - ASA 81, Plavix 75, and high dose statin daily  - Toprol  mg daily  - no plan for further coronary intervention at this time per Dr. Escalera    Additional history obtained from family [independent historian] and plan of care discussed in detail

## 2025-05-31 NOTE — PROGRESS NOTE ADULT - SUBJECTIVE AND OBJECTIVE BOX
HPI/Overnight Events:   Patient seen and examined at bedside this AM. No overnight events. No complaints.      Vital Signs Last 24 Hrs  T(C): 36.8 (31 May 2025 15:58), Max: 37.8 (30 May 2025 19:24)  T(F): 98.3 (31 May 2025 15:58), Max: 100.1 (30 May 2025 19:24)  HR: 75 (31 May 2025 16:00) (64 - 83)  BP: 134/71 (31 May 2025 16:00) (134/56 - 155/51)  BP(mean): 90 (31 May 2025 16:00) (67 - 107)  RR: 18 (31 May 2025 16:00) (18 - 18)  SpO2: 99% (31 May 2025 16:00) (94% - 100%)    Parameters below as of 31 May 2025 16:00  Patient On (Oxygen Delivery Method): room air        I&O's Detail    30 May 2025 07:01  -  31 May 2025 07:00  --------------------------------------------------------  IN:    Heparin: 184 mL    IV PiggyBack: 62.5 mL    Lactated Ringers: 525 mL    Oral Fluid: 480 mL  Total IN: 1251.5 mL    OUT:    Indwelling Catheter - Urethral (mL): 2400 mL  Total OUT: 2400 mL    Total NET: -1148.5 mL      31 May 2025 07:01  -  31 May 2025 17:47  --------------------------------------------------------  IN:    Heparin: 56 mL    Heparin: 16 mL    IV PiggyBack: 187.5 mL    Oral Fluid: 550 mL  Total IN: 809.5 mL    OUT:    Indwelling Catheter - Urethral (mL): 1065 mL  Total OUT: 1065 mL    Total NET: -255.5 mL          LABS:                        8.0    7.86  )-----------( 174      ( 31 May 2025 02:18 )             27.9     05-31    138  |  107  |  12.3  ----------------------------<  89  3.8   |  18.0[L]  |  0.85    Ca    8.0[L]      31 May 2025 02:18  Phos  2.2     05-31  Mg     1.9     05-31      PT/INR - ( 30 May 2025 04:00 )   PT: 14.1 sec;   INR: 1.25 ratio         PTT - ( 31 May 2025 02:18 )  PTT:30.4 sec  Urinalysis Basic - ( 31 May 2025 02:18 )    Color: x / Appearance: x / SG: x / pH: x  Gluc: 89 mg/dL / Ketone: x  / Bili: x / Urobili: x   Blood: x / Protein: x / Nitrite: x   Leuk Esterase: x / RBC: x / WBC x   Sq Epi: x / Non Sq Epi: x / Bacteria: x          Physical Exam  Constitutional: patient resting comfortably in bed, in no acute distress  Respiratory: non-labored breathing on RA  Cardiovascular: RRR  Gastrointestinal: abdomen soft, non-tender, non-distended, no rebound tenderness/guarding  Mcdowell: montiel urine in bag  Right groin : mild ecchymosis , and tender but soft   RLE: dp signal   LLE: Palp dp     MEDICATIONS  (STANDING):  aspirin  chewable 81 milliGRAM(s) Oral daily  atorvastatin 80 milliGRAM(s) Oral at bedtime  chlorhexidine 2% Cloths 1 Application(s) Topical <User Schedule>  clopidogrel Tablet 75 milliGRAM(s) Oral daily  fluticasone propionate/ salmeterol 250-50 MICROgram(s) Diskus 1 Dose(s) Inhalation two times a day  furosemide    Tablet 40 milliGRAM(s) Oral daily  heparin  Infusion 800 Unit(s)/Hr (8 mL/Hr) IV Continuous <Continuous>  metoprolol succinate  milliGRAM(s) Oral daily  polyethylene glycol 3350 17 Gram(s) Oral daily  potassium phosphate / sodium phosphate Powder (PHOS-NaK) 2 Packet(s) Oral once  senna 2 Tablet(s) Oral at bedtime    MEDICATIONS  (PRN):  albuterol/ipratropium for Nebulization 3 milliLiter(s) Nebulizer every 6 hours PRN Shortness of Breath and/or Wheezing  aluminum hydroxide/magnesium hydroxide/simethicone Suspension 30 milliLiter(s) Oral every 4 hours PRN Dyspepsia  melatonin 3 milliGRAM(s) Oral at bedtime PRN Insomnia  ondansetron Injectable 4 milliGRAM(s) IV Push every 8 hours PRN Nausea and/or Vomiting        STUDIES:   EKG, CXR, U/S, CT, MRI     MICRO:   Cultures

## 2025-05-31 NOTE — PROGRESS NOTE ADULT - ASSESSMENT
64-year-old male admitted with decompensated HF s/p C c/b right lower extremity ischemia s/p R groin exploration with Revision of fem-pop bypass, resection of CFA aneurysm, CFA endarterectomy, profunda endarterectomy, interposition CFA to graft bypass     INTERVAL EVENTS: No acute events. Tolerating diet. Neurovascular exam unchanged     NEURO: Alert & Oriented x3, moving all extremities and following commands   #acute postoperative pain   - multimodal pain regimen     CARDIO/VASCULAR: baseline rhythm, palpable distal pulses, no peripheral edema or JVD   CHF, CAD, RLE ischemia s/p revision fem-pop, CFA aneurysm resection,   - c/w DAPT   - c/w lasix and statin   - c/w hep gtt   - q4 neurovascular checks     PULM: symmetric and unlabored respirations  - On RA     GI: Abdomen soft nontender nondistended  Nutrition DASH diet   #At risk for Stress ulcer:     RENAL: Appears euvolemic  - kingsley per primary team     HEME: No pallor, jaundice, or ecchymosis.   #At risk for DVT: SCD + hep gtt .  #Acute Blood Loss Anemia:  trend and transfuse for HGB <7     ID: No fevers or leukocytosis.     ENDO:  No striae, and skin changes.  - Maintain euglycemia     SKIN: Skin intact, no rashes, or ulcers .     LINES/DRAINS/AIRWAY: kingsley     GOALS OF CARE / FAMILY DISCUSSION / CODE STATUS: Full code    DISPOSITION: Surgical floor under vascular surgery care.  64-year-old male admitted with decompensated HF s/p C c/b right lower extremity ischemia s/p R groin exploration with Revision of fem-pop bypass, resection of CFA aneurysm, CFA endarterectomy, profunda endarterectomy, interposition CFA to graft bypass     INTERVAL EVENTS: No acute events. Tolerating diet. Neurovascular exam unchanged     NEURO: Alert & Oriented x3, moving all extremities and following commands   #acute postoperative pain   - multimodal pain regimen     CARDIO/VASCULAR: baseline rhythm, palpable distal pulses, no peripheral edema or JVD   CHF, CAD, RLE ischemia s/p revision fem-pop, CFA aneurysm resection,   - c/w DAPT   - c/w lasix and statin   - c/w hep gtt   - q4 neurovascular checks     PULM: symmetric and unlabored respirations  - On RA     GI: Abdomen soft nontender nondistended  Nutrition DASH diet   #At risk for Stress ulcer:     RENAL: Appears euvolemic  - kingsley per primary team    #Hypophosphatemia: trend and replete with IV/Oral phosphate.   #Metabolic Acidosis: Monitor with volume resuscitation.     HEME: No pallor, jaundice, or ecchymosis.   #At risk for DVT: SCD + hep gtt .  #Acute Blood Loss Anemia:  trend and transfuse for HGB <7     ID: No fevers or leukocytosis.     ENDO:  No striae, and skin changes.  - Maintain euglycemia     SKIN: Skin intact, no rashes, or ulcers .     LINES/DRAINS/AIRWAY: kingsley     GOALS OF CARE / FAMILY DISCUSSION / CODE STATUS: Full code    DISPOSITION: Surgical floor under vascular surgery care.

## 2025-05-31 NOTE — PROGRESS NOTE ADULT - SUBJECTIVE AND OBJECTIVE BOX
Interval events and physical exam are included with assessment and plan below.    Objective   T(C): 36.9 (05-31-25 @ 20:27), Max: 37 (05-31-25 @ 13:11)  HR: 90 (05-31-25 @ 22:00) (64 - 90)  BP: 116/87 (05-31-25 @ 22:00) (116/87 - 155/51)  RR: 18 (05-31-25 @ 22:00) (17 - 18)  SpO2: 98% (05-31-25 @ 22:00) (94% - 100%)      05-30-25 @ 07:01  -  05-31-25 @ 07:00  --------------------------------------------------------  IN: 1251.5 mL / OUT: 2400 mL / NET: -1148.5 mL    05-31-25 @ 07:01  -  05-31-25 @ 22:28  --------------------------------------------------------  IN: 817.5 mL / OUT: 1190 mL / NET: -372.5 mL        HOME MEDS:  aspirin 81 mg oral tablet: 1 tab(s) orally once a day  atorvastatin 80 mg oral tablet: 1 tab(s) orally once a day (at bedtime)  lisinopril 10 mg oral tablet: 1 tab(s) orally once a day      CURRENT MEDS:   albuterol/ipratropium for Nebulization 3 milliLiter(s) Nebulizer every 6 hours PRN  aluminum hydroxide/magnesium hydroxide/simethicone Suspension 30 milliLiter(s) Oral every 4 hours PRN  aspirin  chewable 81 milliGRAM(s) Oral daily  atorvastatin 80 milliGRAM(s) Oral at bedtime  chlorhexidine 2% Cloths 1 Application(s) Topical <User Schedule>  clopidogrel Tablet 75 milliGRAM(s) Oral daily  fluticasone propionate/ salmeterol 250-50 MICROgram(s) Diskus 1 Dose(s) Inhalation two times a day  furosemide    Tablet 40 milliGRAM(s) Oral daily  heparin  Infusion 800 Unit(s)/Hr IV Continuous <Continuous>  melatonin 3 milliGRAM(s) Oral at bedtime PRN  metoprolol succinate  milliGRAM(s) Oral daily  ondansetron Injectable 4 milliGRAM(s) IV Push every 8 hours PRN  polyethylene glycol 3350 17 Gram(s) Oral daily  senna 2 Tablet(s) Oral at bedtime

## 2025-05-31 NOTE — CHART NOTE - NSCHARTNOTEFT_GEN_A_CORE
65 yo m pmhx CHF presented with CP/acute decompensated heart failure taken for [facility] 5/27 s/p unsuccessful attempt to access b/l femoral arteries for staged POCI c/b loss of Right PP now POD1 R groin exploration rsxn of R femoral aneurysm CFA endareterctomy, profunda endarterectomy, interposition bypass with PTFE from CFA to prox bypoass, thromboembolectomy of previous fem to AT bypass, anastomosis of origin of profunda to bypass and additional cutdown to R lower thigh  at bypass w/ further thromboembolectomy.    PAST 24HR:  Patient HD stable, left radial candice correlating with Noninvasive BP cuff.  H&H stable. +doppler pulses intact     A&P:  NEURO: Pain well controlled with current regimen.  PT when ok by vascular   CV:  HD stable, metoprolol, DAPT therapy, daily PO lasix, statin  RESP: RA, encourage incentive spirometry while in bed. inh bronchodilators   RENAL: monitor urine output, bun/cr and electrolytes. strict I&Os.  Mcdowell to remain in place until ok to be removed by surgery.  replace lytes as needed  GI: Dash/TLC diet  ENDO: No active issues  ID: No active infectious process  HEME: Heparin gtt at 800U, planned to advance per Vascular resident tomorrow if remains stable. trend cbc, goal hgb >8 in setting of cad  DISPO: Full code     Patient stable for transfer to telemetry, discussed with vascular team.  SICU signing off.  Please reconsult as needed

## 2025-06-01 LAB
ANION GAP SERPL CALC-SCNC: 18 MMOL/L — HIGH (ref 5–17)
APTT BLD: 30.8 SEC — SIGNIFICANT CHANGE UP (ref 26.1–36.8)
APTT BLD: 38.9 SEC — HIGH (ref 26.1–36.8)
APTT BLD: > 200 (ref 26.1–36.8)
BASOPHILS # BLD AUTO: 0.04 K/UL — SIGNIFICANT CHANGE UP (ref 0–0.2)
BASOPHILS NFR BLD AUTO: 0.4 % — SIGNIFICANT CHANGE UP (ref 0–2)
BUN SERPL-MCNC: 15.8 MG/DL — SIGNIFICANT CHANGE UP (ref 8–20)
CALCIUM SERPL-MCNC: 8.4 MG/DL — SIGNIFICANT CHANGE UP (ref 8.4–10.5)
CHLORIDE SERPL-SCNC: 105 MMOL/L — SIGNIFICANT CHANGE UP (ref 96–108)
CO2 SERPL-SCNC: 17 MMOL/L — LOW (ref 22–29)
CREAT SERPL-MCNC: 0.88 MG/DL — SIGNIFICANT CHANGE UP (ref 0.5–1.3)
EGFR: 96 ML/MIN/1.73M2 — SIGNIFICANT CHANGE UP
EGFR: 96 ML/MIN/1.73M2 — SIGNIFICANT CHANGE UP
EOSINOPHIL # BLD AUTO: 0.16 K/UL — SIGNIFICANT CHANGE UP (ref 0–0.5)
EOSINOPHIL NFR BLD AUTO: 1.8 % — SIGNIFICANT CHANGE UP (ref 0–6)
GLUCOSE SERPL-MCNC: 143 MG/DL — HIGH (ref 70–99)
HCT VFR BLD CALC: 33.3 % — LOW (ref 39–50)
HCT VFR BLD CALC: 34.5 % — LOW (ref 39–50)
HGB BLD-MCNC: 10 G/DL — LOW (ref 13–17)
HGB BLD-MCNC: 10.2 G/DL — LOW (ref 13–17)
IMM GRANULOCYTES # BLD AUTO: 0.04 K/UL — SIGNIFICANT CHANGE UP (ref 0–0.07)
IMM GRANULOCYTES NFR BLD AUTO: 0.4 % — SIGNIFICANT CHANGE UP (ref 0–0.9)
LYMPHOCYTES # BLD AUTO: 1.17 K/UL — SIGNIFICANT CHANGE UP (ref 1–3.3)
LYMPHOCYTES NFR BLD AUTO: 13.1 % — SIGNIFICANT CHANGE UP (ref 13–44)
MAGNESIUM SERPL-MCNC: 2.2 MG/DL — SIGNIFICANT CHANGE UP (ref 1.6–2.6)
MCHC RBC-ENTMCNC: 24.6 PG — LOW (ref 27–34)
MCHC RBC-ENTMCNC: 24.8 PG — LOW (ref 27–34)
MCHC RBC-ENTMCNC: 29.6 G/DL — LOW (ref 32–36)
MCHC RBC-ENTMCNC: 30 G/DL — LOW (ref 32–36)
MCV RBC AUTO: 82.4 FL — SIGNIFICANT CHANGE UP (ref 80–100)
MCV RBC AUTO: 83.1 FL — SIGNIFICANT CHANGE UP (ref 80–100)
MONOCYTES # BLD AUTO: 0.92 K/UL — HIGH (ref 0–0.9)
MONOCYTES NFR BLD AUTO: 10.3 % — SIGNIFICANT CHANGE UP (ref 2–14)
NEUTROPHILS # BLD AUTO: 6.6 K/UL — SIGNIFICANT CHANGE UP (ref 1.8–7.4)
NEUTROPHILS NFR BLD AUTO: 74 % — SIGNIFICANT CHANGE UP (ref 43–77)
NRBC # BLD AUTO: 0 K/UL — SIGNIFICANT CHANGE UP (ref 0–0)
NRBC # BLD AUTO: 0 K/UL — SIGNIFICANT CHANGE UP (ref 0–0)
NRBC # FLD: 0 K/UL — SIGNIFICANT CHANGE UP (ref 0–0)
NRBC # FLD: 0 K/UL — SIGNIFICANT CHANGE UP (ref 0–0)
NRBC BLD AUTO-RTO: 0 /100 WBCS — SIGNIFICANT CHANGE UP (ref 0–0)
NRBC BLD AUTO-RTO: 0 /100 WBCS — SIGNIFICANT CHANGE UP (ref 0–0)
PHOSPHATE SERPL-MCNC: 2.3 MG/DL — LOW (ref 2.4–4.7)
PLATELET # BLD AUTO: 197 K/UL — SIGNIFICANT CHANGE UP (ref 150–400)
PLATELET # BLD AUTO: 215 K/UL — SIGNIFICANT CHANGE UP (ref 150–400)
PMV BLD: 10 FL — SIGNIFICANT CHANGE UP (ref 7–13)
PMV BLD: 10.8 FL — SIGNIFICANT CHANGE UP (ref 7–13)
POTASSIUM SERPL-MCNC: 3.6 MMOL/L — SIGNIFICANT CHANGE UP (ref 3.5–5.3)
POTASSIUM SERPL-SCNC: 3.6 MMOL/L — SIGNIFICANT CHANGE UP (ref 3.5–5.3)
RBC # BLD: 4.04 M/UL — LOW (ref 4.2–5.8)
RBC # BLD: 4.15 M/UL — LOW (ref 4.2–5.8)
RBC # FLD: 18.2 % — HIGH (ref 10.3–14.5)
RBC # FLD: 18.4 % — HIGH (ref 10.3–14.5)
SODIUM SERPL-SCNC: 140 MMOL/L — SIGNIFICANT CHANGE UP (ref 135–145)
WBC # BLD: 10 K/UL — SIGNIFICANT CHANGE UP (ref 3.8–10.5)
WBC # BLD: 8.93 K/UL — SIGNIFICANT CHANGE UP (ref 3.8–10.5)
WBC # FLD AUTO: 10 K/UL — SIGNIFICANT CHANGE UP (ref 3.8–10.5)
WBC # FLD AUTO: 8.93 K/UL — SIGNIFICANT CHANGE UP (ref 3.8–10.5)

## 2025-06-01 RX ORDER — SODIUM PHOSPHATE,DIBASIC DIHYD
30 POWDER (GRAM) MISCELLANEOUS ONCE
Refills: 0 | Status: COMPLETED | OUTPATIENT
Start: 2025-06-01 | End: 2025-06-01

## 2025-06-01 RX ORDER — HEPARIN SODIUM 1000 [USP'U]/ML
6500 INJECTION INTRAVENOUS; SUBCUTANEOUS EVERY 6 HOURS
Refills: 0 | Status: DISCONTINUED | OUTPATIENT
Start: 2025-06-01 | End: 2025-06-03

## 2025-06-01 RX ORDER — HEPARIN SODIUM 1000 [USP'U]/ML
INJECTION INTRAVENOUS; SUBCUTANEOUS
Qty: 25000 | Refills: 0 | Status: DISCONTINUED | OUTPATIENT
Start: 2025-06-01 | End: 2025-06-03

## 2025-06-01 RX ORDER — HEPARIN SODIUM 1000 [USP'U]/ML
3000 INJECTION INTRAVENOUS; SUBCUTANEOUS EVERY 6 HOURS
Refills: 0 | Status: DISCONTINUED | OUTPATIENT
Start: 2025-06-01 | End: 2025-06-03

## 2025-06-01 RX ADMIN — HEPARIN SODIUM 1900 UNIT(S)/HR: 1000 INJECTION INTRAVENOUS; SUBCUTANEOUS at 19:05

## 2025-06-01 RX ADMIN — Medication 1 DOSE(S): at 21:27

## 2025-06-01 RX ADMIN — HEPARIN SODIUM 1500 UNIT(S)/HR: 1000 INJECTION INTRAVENOUS; SUBCUTANEOUS at 09:48

## 2025-06-01 RX ADMIN — Medication 1 APPLICATION(S): at 05:36

## 2025-06-01 RX ADMIN — Medication 81 MILLIGRAM(S): at 13:54

## 2025-06-01 RX ADMIN — METOPROLOL SUCCINATE 100 MILLIGRAM(S): 50 TABLET, EXTENDED RELEASE ORAL at 05:36

## 2025-06-01 RX ADMIN — HEPARIN SODIUM 1500 UNIT(S)/HR: 1000 INJECTION INTRAVENOUS; SUBCUTANEOUS at 13:55

## 2025-06-01 RX ADMIN — Medication 85 MILLIMOLE(S): at 17:49

## 2025-06-01 RX ADMIN — HEPARIN SODIUM 6500 UNIT(S): 1000 INJECTION INTRAVENOUS; SUBCUTANEOUS at 18:15

## 2025-06-01 RX ADMIN — Medication 40 MILLIEQUIVALENT(S): at 17:50

## 2025-06-01 RX ADMIN — Medication 2 TABLET(S): at 21:28

## 2025-06-01 RX ADMIN — POLYETHYLENE GLYCOL 3350 17 GRAM(S): 17 POWDER, FOR SOLUTION ORAL at 13:55

## 2025-06-01 RX ADMIN — CLOPIDOGREL BISULFATE 75 MILLIGRAM(S): 75 TABLET, FILM COATED ORAL at 13:54

## 2025-06-01 RX ADMIN — HEPARIN SODIUM 8 UNIT(S)/HR: 1000 INJECTION INTRAVENOUS; SUBCUTANEOUS at 07:16

## 2025-06-01 RX ADMIN — HEPARIN SODIUM 1900 UNIT(S)/HR: 1000 INJECTION INTRAVENOUS; SUBCUTANEOUS at 18:12

## 2025-06-01 RX ADMIN — ATORVASTATIN CALCIUM 80 MILLIGRAM(S): 80 TABLET, FILM COATED ORAL at 21:27

## 2025-06-01 RX ADMIN — FUROSEMIDE 40 MILLIGRAM(S): 10 INJECTION INTRAMUSCULAR; INTRAVENOUS at 05:36

## 2025-06-01 NOTE — PROVIDER CONTACT NOTE (CRITICAL VALUE NOTIFICATION) - ACTION/TREATMENT ORDERED:
High Dose Vitamin A Counseling: Side effects reviewed, pt to contact office should one occur. Bactrim Counseling:  I discussed with the patient the risks of sulfa antibiotics including but not limited to GI upset, allergic reaction, drug rash, diarrhea, dizziness, photosensitivity, and yeast infections.  Rarely, more serious reactions can occur including but not limited to aplastic anemia, agranulocytosis, methemoglobinemia, blood dyscrasias, liver or kidney failure, lung infiltrates or desquamative/blistering drug rashes. Tetracycline Pregnancy And Lactation Text: This medication is Pregnancy Category D and not consider safe during pregnancy. It is also excreted in breast milk. High Dose Vitamin A Pregnancy And Lactation Text: High dose vitamin A therapy is contraindicated during pregnancy and breast feeding. Awaiting adjustment of heparin gtt Benzoyl Peroxide Counseling: Patient counseled that medicine may cause skin irritation and bleach clothing.  In the event of skin irritation, the patient was advised to reduce the amount of the drug applied or use it less frequently.   The patient verbalized understanding of the proper use and possible adverse effects of benzoyl peroxide.  All of the patient's questions and concerns were addressed. Topical Clindamycin Counseling: Patient counseled that this medication may cause skin irritation or allergic reactions.  In the event of skin irritation, the patient was advised to reduce the amount of the drug applied or use it less frequently.   The patient verbalized understanding of the proper use and possible adverse effects of clindamycin.  All of the patient's questions and concerns were addressed. Topical Retinoid Pregnancy And Lactation Text: This medication is Pregnancy Category C. It is unknown if this medication is excreted in breast milk. Erythromycin Counseling:  I discussed with the patient the risks of erythromycin including but not limited to GI upset, allergic reaction, drug rash, diarrhea, increase in liver enzymes, and yeast infections. Topical Retinoid counseling:  Patient advised to apply a pea-sized amount only at bedtime and wait 30 minutes after washing their face before applying.  If too drying, patient may add a non-comedogenic moisturizer. The patient verbalized understanding of the proper use and possible adverse effects of retinoids.  All of the patient's questions and concerns were addressed. Sarecycline Counseling: Patient advised regarding possible photosensitivity and discoloration of the teeth, skin, lips, tongue and gums.  Patient instructed to avoid sunlight, if possible.  When exposed to sunlight, patients should wear protective clothing, sunglasses, and sunscreen.  The patient was instructed to call the office immediately if the following severe adverse effects occur:  hearing changes, easy bruising/bleeding, severe headache, or vision changes.  The patient verbalized understanding of the proper use and possible adverse effects of sarecycline.  All of the patient's questions and concerns were addressed. Tetracycline Counseling: Patient counseled regarding possible photosensitivity and increased risk for sunburn.  Patient instructed to avoid sunlight, if possible.  When exposed to sunlight, patients should wear protective clothing, sunglasses, and sunscreen.  The patient was instructed to call the office immediately if the following severe adverse effects occur:  hearing changes, easy bruising/bleeding, severe headache, or vision changes.  The patient verbalized understanding of the proper use and possible adverse effects of tetracycline.  All of the patient's questions and concerns were addressed. Patient understands to avoid pregnancy while on therapy due to potential birth defects. Spironolactone Pregnancy And Lactation Text: This medication can cause feminization of the male fetus and should be avoided during pregnancy. The active metabolite is also found in breast milk. Benzoyl Peroxide Pregnancy And Lactation Text: This medication is Pregnancy Category C. It is unknown if benzoyl peroxide is excreted in breast milk. Isotretinoin Counseling: Patient should get monthly blood tests, not donate blood, not drive at night if vision affected, not share medication, and not undergo elective surgery for 6 months after tx completed. Side effects reviewed, pt to contact office should one occur. Topical Clindamycin Pregnancy And Lactation Text: This medication is Pregnancy Category B and is considered safe during pregnancy. It is unknown if it is excreted in breast milk. Detail Level: Zone Topical Sulfur Applications Pregnancy And Lactation Text: This medication is Pregnancy Category C and has an unknown safety profile during pregnancy. It is unknown if this topical medication is excreted in breast milk. Birth Control Pills Pregnancy And Lactation Text: This medication should be avoided if pregnant and for the first 30 days post-partum. Spironolactone Counseling: Patient advised regarding risks of diarrhea, abdominal pain, hyperkalemia, birth defects (for female patients), liver toxicity and renal toxicity. The patient may need blood work to monitor liver and kidney function and potassium levels while on therapy. The patient verbalized understanding of the proper use and possible adverse effects of spironolactone.  All of the patient's questions and concerns were addressed. Minocycline Counseling: Patient advised regarding possible photosensitivity and discoloration of the teeth, skin, lips, tongue and gums.  Patient instructed to avoid sunlight, if possible.  When exposed to sunlight, patients should wear protective clothing, sunglasses, and sunscreen.  The patient was instructed to call the office immediately if the following severe adverse effects occur:  hearing changes, easy bruising/bleeding, severe headache, or vision changes.  The patient verbalized understanding of the proper use and possible adverse effects of minocycline.  All of the patient's questions and concerns were addressed. Topical Sulfur Applications Counseling: Topical Sulfur Counseling: Patient counseled that this medication may cause skin irritation or allergic reactions.  In the event of skin irritation, the patient was advised to reduce the amount of the drug applied or use it less frequently.   The patient verbalized understanding of the proper use and possible adverse effects of topical sulfur application.  All of the patient's questions and concerns were addressed. Include Pregnancy/Lactation Warning?: No Topical Retinoids Recommendations: Differin Gel nightly as tolerated. If not resolving, return to clinic for topical prescriptions Erythromycin Pregnancy And Lactation Text: This medication is Pregnancy Category B and is considered safe during pregnancy. It is also excreted in breast milk. Tazorac Counseling:  Patient advised that medication is irritating and drying.  Patient may need to apply sparingly and wash off after an hour before eventually leaving it on overnight.  The patient verbalized understanding of the proper use and possible adverse effects of tazorac.  All of the patient's questions and concerns were addressed. Dapsone Pregnancy And Lactation Text: This medication is Pregnancy Category C and is not considered safe during pregnancy or breast feeding. Dapsone Counseling: I discussed with the patient the risks of dapsone including but not limited to hemolytic anemia, agranulocytosis, rashes, methemoglobinemia, kidney failure, peripheral neuropathy, headaches, GI upset, and liver toxicity.  Patients who start dapsone require monitoring including baseline LFTs and weekly CBCs for the first month, then every month thereafter.  The patient verbalized understanding of the proper use and possible adverse effects of dapsone.  All of the patient's questions and concerns were addressed. Azithromycin Pregnancy And Lactation Text: This medication is considered safe during pregnancy and is also secreted in breast milk. Detail Level: Detailed Birth Control Pills Counseling: Birth Control Pill Counseling: I discussed with the patient the potential side effects of OCPs including but not limited to increased risk of stroke, heart attack, thrombophlebitis, deep venous thrombosis, hepatic adenomas, breast changes, GI upset, headaches, and depression.  The patient verbalized understanding of the proper use and possible adverse effects of OCPs. All of the patient's questions and concerns were addressed. Doxycycline Pregnancy And Lactation Text: This medication is Pregnancy Category D and not consider safe during pregnancy. It is also excreted in breast milk but is considered safe for shorter treatment courses. Doxycycline Counseling:  Patient counseled regarding possible photosensitivity and increased risk for sunburn.  Patient instructed to avoid sunlight, if possible.  When exposed to sunlight, patients should wear protective clothing, sunglasses, and sunscreen.  The patient was instructed to call the office immediately if the following severe adverse effects occur:  hearing changes, easy bruising/bleeding, severe headache, or vision changes.  The patient verbalized understanding of the proper use and possible adverse effects of doxycycline.  All of the patient's questions and concerns were addressed. Azithromycin Counseling:  I discussed with the patient the risks of azithromycin including but not limited to GI upset, allergic reaction, drug rash, diarrhea, and yeast infections. Tazorac Pregnancy And Lactation Text: This medication is not safe during pregnancy. It is unknown if this medication is excreted in breast milk. Bactrim Pregnancy And Lactation Text: This medication is Pregnancy Category D and is known to cause fetal risk.  It is also excreted in breast milk. Isotretinoin Pregnancy And Lactation Text: This medication is Pregnancy Category X and is considered extremely dangerous during pregnancy. It is unknown if it is excreted in breast milk.

## 2025-06-01 NOTE — PROGRESS NOTE ADULT - ASSESSMENT
A/P     Assessment   63yo here for NSTEMI, medically and vascular surgically complex male with sami 2b ischemia of RLE after a cardiac cath with bilateral femoral access, subsequently aborted after difficulty advancing wire. Found with PSA of right CFA and occlusive thrombus of R Fem AT bypass. Now s/p right thigh and groin exploration, excision of PSA, endarterectomy of CFA and Profunda, and interposition bypass CFA to Graft with side anastomosis of profunda to PTFE as well. This AM feels fine, foot perfusing and neurologically improved.   Hgb stable     Plan:   - kingsley out this am, passed TOV   - OOB   - Pain control  - DASH diet   - full dose hep started today  - Cardiology recs appreciated   - Contiue Q4 neurovasc checks

## 2025-06-01 NOTE — PROGRESS NOTE ADULT - SUBJECTIVE AND OBJECTIVE BOX
SUBJECTIVE / 24H EVENTS:    Pt seen and examined at bedside by the team during rounds. Pt found to be resting in bed comfortably with no new acute complaints at the time of examination. Pt denies CP, SOB, N/V, fever, chills.     MEDICATIONS  (STANDING):  aspirin  chewable 81 milliGRAM(s) Oral daily  atorvastatin 80 milliGRAM(s) Oral at bedtime  chlorhexidine 2% Cloths 1 Application(s) Topical <User Schedule>  clopidogrel Tablet 75 milliGRAM(s) Oral daily  fluticasone propionate/ salmeterol 250-50 MICROgram(s) Diskus 1 Dose(s) Inhalation two times a day  furosemide    Tablet 40 milliGRAM(s) Oral daily  heparin  Infusion.  Unit(s)/Hr (15 mL/Hr) IV Continuous <Continuous>  metoprolol succinate  milliGRAM(s) Oral daily  polyethylene glycol 3350 17 Gram(s) Oral daily  senna 2 Tablet(s) Oral at bedtime    MEDICATIONS  (PRN):  albuterol/ipratropium for Nebulization 3 milliLiter(s) Nebulizer every 6 hours PRN Shortness of Breath and/or Wheezing  aluminum hydroxide/magnesium hydroxide/simethicone Suspension 30 milliLiter(s) Oral every 4 hours PRN Dyspepsia  heparin   Injectable 6500 Unit(s) IV Push every 6 hours PRN For aPTT less than 40  heparin   Injectable 3000 Unit(s) IV Push every 6 hours PRN For aPTT between 40 - 57  melatonin 3 milliGRAM(s) Oral at bedtime PRN Insomnia  ondansetron Injectable 4 milliGRAM(s) IV Push every 8 hours PRN Nausea and/or Vomiting      Vital Signs Last 24 Hrs  T(C): 36.9 (01 Jun 2025 15:49), Max: 37.7 (01 Jun 2025 04:03)  T(F): 98.4 (01 Jun 2025 15:49), Max: 99.8 (01 Jun 2025 04:03)  HR: 68 (01 Jun 2025 16:00) (60 - 90)  BP: 151/70 (01 Jun 2025 16:00) (112/69 - 170/60)  BP(mean): 93 (01 Jun 2025 16:00) (72 - 102)  RR: 18 (01 Jun 2025 16:00) (17 - 18)  SpO2: 100% (01 Jun 2025 16:00) (97% - 100%)    Parameters below as of 01 Jun 2025 16:00  Patient On (Oxygen Delivery Method): room air        Physical Exam    Constitutional: patient resting comfortably in bed, in no acute distress  Respiratory: non-labored breathing on RA  Cardiovascular: RRR  Gastrointestinal: abdomen soft, non-tender, non-distended, no rebound tenderness/guarding  Mcdowell: montiel urine in bag  Right groin : mild ecchymosis , and tender but soft   RLE: dp signal   LLE: Palp dp     I&O's Detail    31 May 2025 07:01  -  01 Jun 2025 07:00  --------------------------------------------------------  IN:    Heparin: 56 mL    Heparin: 128 mL    IV PiggyBack: 187.5 mL    Oral Fluid: 1400 mL    PRBCs (Packed Red Blood Cells): 315 mL  Total IN: 2086.5 mL    OUT:    Indwelling Catheter - Urethral (mL): 1890 mL  Total OUT: 1890 mL    Total NET: 196.5 mL      01 Jun 2025 07:01  -  01 Jun 2025 16:40  --------------------------------------------------------  IN:    Heparin: 16 mL    Heparin Infusion: 120 mL  Total IN: 136 mL    OUT:    Indwelling Catheter - Urethral (mL): 350 mL  Total OUT: 350 mL    Total NET: -214 mL          LABS:                        10.0   8.93  )-----------( 197      ( 01 Jun 2025 06:20 )             33.3     06-01    140  |  105  |  15.8  ----------------------------<  143[H]  3.6   |  17.0[L]  |  0.88    Ca    8.4      01 Jun 2025 06:20  Phos  2.3     06-01  Mg     2.2     06-01      PTT - ( 01 Jun 2025 08:43 )  PTT:30.8 sec  Urinalysis Basic - ( 01 Jun 2025 06:20 )    Color: x / Appearance: x / SG: x / pH: x  Gluc: 143 mg/dL / Ketone: x  / Bili: x / Urobili: x   Blood: x / Protein: x / Nitrite: x   Leuk Esterase: x / RBC: x / WBC x   Sq Epi: x / Non Sq Epi: x / Bacteria: x

## 2025-06-02 LAB
ANION GAP SERPL CALC-SCNC: 17 MMOL/L — SIGNIFICANT CHANGE UP (ref 5–17)
APTT BLD: 101 SEC — HIGH (ref 26.1–36.8)
APTT BLD: 70.6 SEC — HIGH (ref 26.1–36.8)
APTT BLD: 71.4 SEC — HIGH (ref 26.1–36.8)
APTT BLD: 94.8 SEC — HIGH (ref 26.1–36.8)
BASOPHILS # BLD AUTO: 0.05 K/UL — SIGNIFICANT CHANGE UP (ref 0–0.2)
BASOPHILS NFR BLD AUTO: 0.6 % — SIGNIFICANT CHANGE UP (ref 0–2)
BUN SERPL-MCNC: 19.6 MG/DL — SIGNIFICANT CHANGE UP (ref 8–20)
CALCIUM SERPL-MCNC: 8.7 MG/DL — SIGNIFICANT CHANGE UP (ref 8.4–10.5)
CHLORIDE SERPL-SCNC: 104 MMOL/L — SIGNIFICANT CHANGE UP (ref 96–108)
CO2 SERPL-SCNC: 18 MMOL/L — LOW (ref 22–29)
CREAT SERPL-MCNC: 0.96 MG/DL — SIGNIFICANT CHANGE UP (ref 0.5–1.3)
EGFR: 88 ML/MIN/1.73M2 — SIGNIFICANT CHANGE UP
EGFR: 88 ML/MIN/1.73M2 — SIGNIFICANT CHANGE UP
EOSINOPHIL # BLD AUTO: 0.3 K/UL — SIGNIFICANT CHANGE UP (ref 0–0.5)
EOSINOPHIL NFR BLD AUTO: 3.5 % — SIGNIFICANT CHANGE UP (ref 0–6)
GLUCOSE SERPL-MCNC: 108 MG/DL — HIGH (ref 70–99)
HCT VFR BLD CALC: 34.1 % — LOW (ref 39–50)
HGB BLD-MCNC: 10.2 G/DL — LOW (ref 13–17)
IMM GRANULOCYTES # BLD AUTO: 0.03 K/UL — SIGNIFICANT CHANGE UP (ref 0–0.07)
IMM GRANULOCYTES NFR BLD AUTO: 0.4 % — SIGNIFICANT CHANGE UP (ref 0–0.9)
LYMPHOCYTES # BLD AUTO: 1.5 K/UL — SIGNIFICANT CHANGE UP (ref 1–3.3)
LYMPHOCYTES NFR BLD AUTO: 17.7 % — SIGNIFICANT CHANGE UP (ref 13–44)
MAGNESIUM SERPL-MCNC: 2.1 MG/DL — SIGNIFICANT CHANGE UP (ref 1.6–2.6)
MCHC RBC-ENTMCNC: 24.6 PG — LOW (ref 27–34)
MCHC RBC-ENTMCNC: 29.9 G/DL — LOW (ref 32–36)
MCV RBC AUTO: 82.4 FL — SIGNIFICANT CHANGE UP (ref 80–100)
MONOCYTES # BLD AUTO: 0.88 K/UL — SIGNIFICANT CHANGE UP (ref 0–0.9)
MONOCYTES NFR BLD AUTO: 10.4 % — SIGNIFICANT CHANGE UP (ref 2–14)
NEUTROPHILS # BLD AUTO: 5.73 K/UL — SIGNIFICANT CHANGE UP (ref 1.8–7.4)
NEUTROPHILS NFR BLD AUTO: 67.4 % — SIGNIFICANT CHANGE UP (ref 43–77)
NRBC # BLD AUTO: 0 K/UL — SIGNIFICANT CHANGE UP (ref 0–0)
NRBC # FLD: 0 K/UL — SIGNIFICANT CHANGE UP (ref 0–0)
NRBC BLD AUTO-RTO: 0 /100 WBCS — SIGNIFICANT CHANGE UP (ref 0–0)
PHOSPHATE SERPL-MCNC: 3.6 MG/DL — SIGNIFICANT CHANGE UP (ref 2.4–4.7)
PLATELET # BLD AUTO: 220 K/UL — SIGNIFICANT CHANGE UP (ref 150–400)
PMV BLD: 10.5 FL — SIGNIFICANT CHANGE UP (ref 7–13)
POTASSIUM SERPL-MCNC: 3.8 MMOL/L — SIGNIFICANT CHANGE UP (ref 3.5–5.3)
POTASSIUM SERPL-SCNC: 3.8 MMOL/L — SIGNIFICANT CHANGE UP (ref 3.5–5.3)
RBC # BLD: 4.14 M/UL — LOW (ref 4.2–5.8)
RBC # FLD: 18.5 % — HIGH (ref 10.3–14.5)
SODIUM SERPL-SCNC: 139 MMOL/L — SIGNIFICANT CHANGE UP (ref 135–145)
WBC # BLD: 8.49 K/UL — SIGNIFICANT CHANGE UP (ref 3.8–10.5)
WBC # FLD AUTO: 8.49 K/UL — SIGNIFICANT CHANGE UP (ref 3.8–10.5)

## 2025-06-02 RX ORDER — APIXABAN 2.5 MG/1
1 TABLET, FILM COATED ORAL
Qty: 60 | Refills: 3
Start: 2025-06-02

## 2025-06-02 RX ADMIN — HEPARIN SODIUM 1700 UNIT(S)/HR: 1000 INJECTION INTRAVENOUS; SUBCUTANEOUS at 07:00

## 2025-06-02 RX ADMIN — Medication 1 APPLICATION(S): at 05:24

## 2025-06-02 RX ADMIN — HEPARIN SODIUM 1700 UNIT(S)/HR: 1000 INJECTION INTRAVENOUS; SUBCUTANEOUS at 19:11

## 2025-06-02 RX ADMIN — Medication 100 MILLIEQUIVALENT(S): at 11:31

## 2025-06-02 RX ADMIN — Medication 1 DOSE(S): at 08:32

## 2025-06-02 RX ADMIN — CLOPIDOGREL BISULFATE 75 MILLIGRAM(S): 75 TABLET, FILM COATED ORAL at 11:31

## 2025-06-02 RX ADMIN — HEPARIN SODIUM 1700 UNIT(S)/HR: 1000 INJECTION INTRAVENOUS; SUBCUTANEOUS at 14:32

## 2025-06-02 RX ADMIN — ATORVASTATIN CALCIUM 80 MILLIGRAM(S): 80 TABLET, FILM COATED ORAL at 21:54

## 2025-06-02 RX ADMIN — METOPROLOL SUCCINATE 100 MILLIGRAM(S): 50 TABLET, EXTENDED RELEASE ORAL at 05:24

## 2025-06-02 RX ADMIN — FUROSEMIDE 40 MILLIGRAM(S): 10 INJECTION INTRAMUSCULAR; INTRAVENOUS at 05:24

## 2025-06-02 RX ADMIN — HEPARIN SODIUM 1700 UNIT(S)/HR: 1000 INJECTION INTRAVENOUS; SUBCUTANEOUS at 21:54

## 2025-06-02 RX ADMIN — HEPARIN SODIUM 1900 UNIT(S)/HR: 1000 INJECTION INTRAVENOUS; SUBCUTANEOUS at 01:04

## 2025-06-02 RX ADMIN — Medication 81 MILLIGRAM(S): at 11:32

## 2025-06-02 RX ADMIN — HEPARIN SODIUM 1900 UNIT(S)/HR: 1000 INJECTION INTRAVENOUS; SUBCUTANEOUS at 05:28

## 2025-06-02 NOTE — PROGRESS NOTE ADULT - ASSESSMENT
63yo here for NSTEMI, medically and vascular surgically complex male with sami 2b ischemia of RLE after a cardiac cath with bilateral femoral access, subsequently aborted after difficulty advancing wire. Found with PSA of right CFA and occlusive thrombus of R Fem AT bypass. Now s/p right thigh and groin exploration, excision of PSA, endarterectomy of CFA and Profunda, and interposition bypass CFA to Graft with side anastomosis of profunda to PTFE as well. This AM feels fine, foot perfusing and neurologically improved.   Hgb stable     Plan:   - OOB   - Pain control  - DASH diet   - continue heparin gtt  - Cardiology recs appreciated   - Continue Q4 neurovasc checks   - for PT evaluation and dispo planning    Patient discussed with Dr. Ma

## 2025-06-02 NOTE — PHYSICAL THERAPY INITIAL EVALUATION ADULT - PERTINENT HX OF CURRENT PROBLEM, REHAB EVAL
NSTEMI, acute hypoxic respiratory failure, s/p right thigh and groin exploration, excision of PSA, endarterectomy of CFA and Profunda, and interposition bypass CFA to Graft with side anastomosis of profunda to PTFE

## 2025-06-02 NOTE — PROGRESS NOTE ADULT - SUBJECTIVE AND OBJECTIVE BOX
HPI/OVERNIGHT EVENTS:  NAEON, VSS and patient AF.     MEDICATIONS  (STANDING):  aspirin  chewable 81 milliGRAM(s) Oral daily  atorvastatin 80 milliGRAM(s) Oral at bedtime  chlorhexidine 2% Cloths 1 Application(s) Topical <User Schedule>  clopidogrel Tablet 75 milliGRAM(s) Oral daily  fluticasone propionate/ salmeterol 250-50 MICROgram(s) Diskus 1 Dose(s) Inhalation two times a day  furosemide    Tablet 40 milliGRAM(s) Oral daily  heparin  Infusion.  Unit(s)/Hr (15 mL/Hr) IV Continuous <Continuous>  metoprolol succinate  milliGRAM(s) Oral daily  polyethylene glycol 3350 17 Gram(s) Oral daily  senna 2 Tablet(s) Oral at bedtime    MEDICATIONS  (PRN):  albuterol/ipratropium for Nebulization 3 milliLiter(s) Nebulizer every 6 hours PRN Shortness of Breath and/or Wheezing  aluminum hydroxide/magnesium hydroxide/simethicone Suspension 30 milliLiter(s) Oral every 4 hours PRN Dyspepsia  heparin   Injectable 6500 Unit(s) IV Push every 6 hours PRN For aPTT less than 40  heparin   Injectable 3000 Unit(s) IV Push every 6 hours PRN For aPTT between 40 - 57  melatonin 3 milliGRAM(s) Oral at bedtime PRN Insomnia  ondansetron Injectable 4 milliGRAM(s) IV Push every 8 hours PRN Nausea and/or Vomiting      Vital Signs Last 24 Hrs  T(C): 36.6 (02 Jun 2025 07:33), Max: 37.2 (01 Jun 2025 20:26)  T(F): 97.9 (02 Jun 2025 07:33), Max: 98.9 (01 Jun 2025 20:26)  HR: 61 (02 Jun 2025 08:00) (56 - 98)  BP: 135/61 (02 Jun 2025 08:00) (108/80 - 152/93)  BP(mean): 83 (02 Jun 2025 08:00) (72 - 406)  RR: 17 (02 Jun 2025 08:00) (17 - 18)  SpO2: 99% (02 Jun 2025 08:00) (95% - 100%)    Parameters below as of 02 Jun 2025 08:00  Patient On (Oxygen Delivery Method): room air    Constitutional: patient resting comfortably in bed, in no acute distress  Respiratory: non-labored breathing on RA  Cardiovascular: RRR  Gastrointestinal: abdomen soft, non-tender, non-distended, no rebound tenderness/guarding  Mcdowell: montiel urine in bag  Right groin : mild ecchymosis , and tender but soft, mepelex in place, clean dry & intact.   RLE: dp signal   LLE: Palp dp      I&O's Detail    01 Jun 2025 07:01  -  02 Jun 2025 07:00  --------------------------------------------------------  IN:    Heparin: 16 mL    Heparin Infusion: 367 mL    IV PiggyBack: 340 mL    Oral Fluid: 730 mL  Total IN: 1453 mL    OUT:    Indwelling Catheter - Urethral (mL): 350 mL    Voided (mL): 800 mL  Total OUT: 1150 mL    Total NET: 303 mL      02 Jun 2025 07:01  -  02 Jun 2025 08:55  --------------------------------------------------------  IN:    Heparin Infusion: 17 mL  Total IN: 17 mL    OUT:    Oral Fluid: 0 mL    Voided (mL): 400 mL  Total OUT: 400 mL    Total NET: -383 mL          LABS:                        10.2   8.49  )-----------( 220      ( 02 Jun 2025 06:23 )             34.1     06-02    139  |  104  |  19.6  ----------------------------<  108[H]  3.8   |  18.0[L]  |  0.96    Ca    8.7      02 Jun 2025 06:23  Phos  3.6     06-02  Mg     2.1     06-02      PTT - ( 02 Jun 2025 06:23 )  PTT:101.0 sec  Urinalysis Basic - ( 02 Jun 2025 06:23 )    Color: x / Appearance: x / SG: x / pH: x  Gluc: 108 mg/dL / Ketone: x  / Bili: x / Urobili: x   Blood: x / Protein: x / Nitrite: x   Leuk Esterase: x / RBC: x / WBC x   Sq Epi: x / Non Sq Epi: x / Bacteria: x

## 2025-06-03 ENCOUNTER — TRANSCRIPTION ENCOUNTER (OUTPATIENT)
Age: 65
End: 2025-06-03

## 2025-06-03 VITALS
DIASTOLIC BLOOD PRESSURE: 69 MMHG | SYSTOLIC BLOOD PRESSURE: 152 MMHG | TEMPERATURE: 98 F | HEART RATE: 68 BPM | RESPIRATION RATE: 17 BRPM | OXYGEN SATURATION: 98 %

## 2025-06-03 LAB
ANION GAP SERPL CALC-SCNC: 13 MMOL/L — SIGNIFICANT CHANGE UP (ref 5–17)
APTT BLD: 76.1 SEC — HIGH (ref 26.1–36.8)
BUN SERPL-MCNC: 20 MG/DL — SIGNIFICANT CHANGE UP (ref 8–20)
CALCIUM SERPL-MCNC: 8.8 MG/DL — SIGNIFICANT CHANGE UP (ref 8.4–10.5)
CHLORIDE SERPL-SCNC: 105 MMOL/L — SIGNIFICANT CHANGE UP (ref 96–108)
CO2 SERPL-SCNC: 21 MMOL/L — LOW (ref 22–29)
CREAT SERPL-MCNC: 0.92 MG/DL — SIGNIFICANT CHANGE UP (ref 0.5–1.3)
EGFR: 93 ML/MIN/1.73M2 — SIGNIFICANT CHANGE UP
EGFR: 93 ML/MIN/1.73M2 — SIGNIFICANT CHANGE UP
GLUCOSE SERPL-MCNC: 114 MG/DL — HIGH (ref 70–99)
HCT VFR BLD CALC: 33.2 % — LOW (ref 39–50)
HGB BLD-MCNC: 10 G/DL — LOW (ref 13–17)
MAGNESIUM SERPL-MCNC: 2.1 MG/DL — SIGNIFICANT CHANGE UP (ref 1.6–2.6)
MCHC RBC-ENTMCNC: 24.6 PG — LOW (ref 27–34)
MCHC RBC-ENTMCNC: 30.1 G/DL — LOW (ref 32–36)
MCV RBC AUTO: 81.6 FL — SIGNIFICANT CHANGE UP (ref 80–100)
NRBC # BLD AUTO: 0 K/UL — SIGNIFICANT CHANGE UP (ref 0–0)
NRBC # FLD: 0 K/UL — SIGNIFICANT CHANGE UP (ref 0–0)
NRBC BLD AUTO-RTO: 0 /100 WBCS — SIGNIFICANT CHANGE UP (ref 0–0)
PHOSPHATE SERPL-MCNC: 3.2 MG/DL — SIGNIFICANT CHANGE UP (ref 2.4–4.7)
PLATELET # BLD AUTO: 240 K/UL — SIGNIFICANT CHANGE UP (ref 150–400)
PMV BLD: 10.7 FL — SIGNIFICANT CHANGE UP (ref 7–13)
POTASSIUM SERPL-MCNC: 3.6 MMOL/L — SIGNIFICANT CHANGE UP (ref 3.5–5.3)
POTASSIUM SERPL-SCNC: 3.6 MMOL/L — SIGNIFICANT CHANGE UP (ref 3.5–5.3)
RBC # BLD: 4.07 M/UL — LOW (ref 4.2–5.8)
RBC # FLD: 18.6 % — HIGH (ref 10.3–14.5)
SODIUM SERPL-SCNC: 139 MMOL/L — SIGNIFICANT CHANGE UP (ref 135–145)
WBC # BLD: 8.21 K/UL — SIGNIFICANT CHANGE UP (ref 3.8–10.5)
WBC # FLD AUTO: 8.21 K/UL — SIGNIFICANT CHANGE UP (ref 3.8–10.5)

## 2025-06-03 PROCEDURE — 99285 EMERGENCY DEPT VISIT HI MDM: CPT | Mod: 25

## 2025-06-03 PROCEDURE — 83550 IRON BINDING TEST: CPT

## 2025-06-03 PROCEDURE — C1757: CPT

## 2025-06-03 PROCEDURE — 83036 HEMOGLOBIN GLYCOSYLATED A1C: CPT

## 2025-06-03 PROCEDURE — 36415 COLL VENOUS BLD VENIPUNCTURE: CPT

## 2025-06-03 PROCEDURE — P9016: CPT

## 2025-06-03 PROCEDURE — 83735 ASSAY OF MAGNESIUM: CPT

## 2025-06-03 PROCEDURE — 86901 BLOOD TYPING SEROLOGIC RH(D): CPT

## 2025-06-03 PROCEDURE — 94760 N-INVAS EAR/PLS OXIMETRY 1: CPT

## 2025-06-03 PROCEDURE — 36430 TRANSFUSION BLD/BLD COMPNT: CPT

## 2025-06-03 PROCEDURE — 84466 ASSAY OF TRANSFERRIN: CPT

## 2025-06-03 PROCEDURE — 75635 CT ANGIO ABDOMINAL ARTERIES: CPT

## 2025-06-03 PROCEDURE — 80061 LIPID PANEL: CPT

## 2025-06-03 PROCEDURE — 93460 R&L HRT ART/VENTRICLE ANGIO: CPT

## 2025-06-03 PROCEDURE — 71275 CT ANGIOGRAPHY CHEST: CPT

## 2025-06-03 PROCEDURE — 82746 ASSAY OF FOLIC ACID SERUM: CPT

## 2025-06-03 PROCEDURE — 85025 COMPLETE CBC W/AUTO DIFF WBC: CPT

## 2025-06-03 PROCEDURE — 96375 TX/PRO/DX INJ NEW DRUG ADDON: CPT

## 2025-06-03 PROCEDURE — 86923 COMPATIBILITY TEST ELECTRIC: CPT

## 2025-06-03 PROCEDURE — 85014 HEMATOCRIT: CPT

## 2025-06-03 PROCEDURE — C1768: CPT

## 2025-06-03 PROCEDURE — 93005 ELECTROCARDIOGRAM TRACING: CPT

## 2025-06-03 PROCEDURE — 93320 DOPPLER ECHO COMPLETE: CPT

## 2025-06-03 PROCEDURE — 88311 DECALCIFY TISSUE: CPT

## 2025-06-03 PROCEDURE — 80048 BASIC METABOLIC PNL TOTAL CA: CPT

## 2025-06-03 PROCEDURE — 85730 THROMBOPLASTIN TIME PARTIAL: CPT

## 2025-06-03 PROCEDURE — C1894: CPT

## 2025-06-03 PROCEDURE — 82435 ASSAY OF BLOOD CHLORIDE: CPT

## 2025-06-03 PROCEDURE — 85027 COMPLETE CBC AUTOMATED: CPT

## 2025-06-03 PROCEDURE — 84443 ASSAY THYROID STIM HORMONE: CPT

## 2025-06-03 PROCEDURE — 84295 ASSAY OF SERUM SODIUM: CPT

## 2025-06-03 PROCEDURE — 86900 BLOOD TYPING SEROLOGIC ABO: CPT

## 2025-06-03 PROCEDURE — 84100 ASSAY OF PHOSPHORUS: CPT

## 2025-06-03 PROCEDURE — 81001 URINALYSIS AUTO W/SCOPE: CPT

## 2025-06-03 PROCEDURE — C1769: CPT

## 2025-06-03 PROCEDURE — 82947 ASSAY GLUCOSE BLOOD QUANT: CPT

## 2025-06-03 PROCEDURE — 80053 COMPREHEN METABOLIC PANEL: CPT

## 2025-06-03 PROCEDURE — 93970 EXTREMITY STUDY: CPT

## 2025-06-03 PROCEDURE — 94640 AIRWAY INHALATION TREATMENT: CPT

## 2025-06-03 PROCEDURE — 82607 VITAMIN B-12: CPT

## 2025-06-03 PROCEDURE — 71046 X-RAY EXAM CHEST 2 VIEWS: CPT

## 2025-06-03 PROCEDURE — 93312 ECHO TRANSESOPHAGEAL: CPT

## 2025-06-03 PROCEDURE — 96374 THER/PROPH/DIAG INJ IV PUSH: CPT

## 2025-06-03 PROCEDURE — 84132 ASSAY OF SERUM POTASSIUM: CPT

## 2025-06-03 PROCEDURE — C1889: CPT

## 2025-06-03 PROCEDURE — 86850 RBC ANTIBODY SCREEN: CPT

## 2025-06-03 PROCEDURE — 85018 HEMOGLOBIN: CPT

## 2025-06-03 PROCEDURE — 82803 BLOOD GASES ANY COMBINATION: CPT

## 2025-06-03 PROCEDURE — C1887: CPT

## 2025-06-03 PROCEDURE — 85610 PROTHROMBIN TIME: CPT

## 2025-06-03 PROCEDURE — 93325 DOPPLER ECHO COLOR FLOW MAPG: CPT

## 2025-06-03 PROCEDURE — 88304 TISSUE EXAM BY PATHOLOGIST: CPT

## 2025-06-03 PROCEDURE — 83605 ASSAY OF LACTIC ACID: CPT

## 2025-06-03 PROCEDURE — 87086 URINE CULTURE/COLONY COUNT: CPT

## 2025-06-03 PROCEDURE — 82728 ASSAY OF FERRITIN: CPT

## 2025-06-03 PROCEDURE — 83540 ASSAY OF IRON: CPT

## 2025-06-03 PROCEDURE — 93306 TTE W/DOPPLER COMPLETE: CPT

## 2025-06-03 PROCEDURE — 85379 FIBRIN DEGRADATION QUANT: CPT

## 2025-06-03 PROCEDURE — 83880 ASSAY OF NATRIURETIC PEPTIDE: CPT

## 2025-06-03 PROCEDURE — 87641 MR-STAPH DNA AMP PROBE: CPT

## 2025-06-03 PROCEDURE — 82330 ASSAY OF CALCIUM: CPT

## 2025-06-03 PROCEDURE — 84484 ASSAY OF TROPONIN QUANT: CPT

## 2025-06-03 PROCEDURE — 87640 STAPH A DNA AMP PROBE: CPT

## 2025-06-03 RX ORDER — APIXABAN 2.5 MG/1
1 TABLET, FILM COATED ORAL
Refills: 0
Start: 2025-06-03

## 2025-06-03 RX ORDER — APIXABAN 2.5 MG/1
10 TABLET, FILM COATED ORAL EVERY 12 HOURS
Refills: 0 | Status: DISCONTINUED | OUTPATIENT
Start: 2025-06-03 | End: 2025-06-03

## 2025-06-03 RX ORDER — METOPROLOL SUCCINATE 50 MG/1
1 TABLET, EXTENDED RELEASE ORAL
Qty: 0 | Refills: 0 | DISCHARGE
Start: 2025-06-03

## 2025-06-03 RX ORDER — APIXABAN 2.5 MG/1
2 TABLET, FILM COATED ORAL
Qty: 14 | Refills: 0
Start: 2025-06-03 | End: 2025-06-09

## 2025-06-03 RX ORDER — CLOPIDOGREL BISULFATE 75 MG/1
1 TABLET, FILM COATED ORAL
Qty: 0 | Refills: 0 | DISCHARGE
Start: 2025-06-03

## 2025-06-03 RX ORDER — APIXABAN 2.5 MG/1
1 TABLET, FILM COATED ORAL
Qty: 56 | Refills: 2
Start: 2025-06-03 | End: 2025-08-25

## 2025-06-03 RX ORDER — LISINOPRIL 5 MG/1
1 TABLET ORAL
Refills: 0 | DISCHARGE

## 2025-06-03 RX ADMIN — Medication 1 DOSE(S): at 11:45

## 2025-06-03 RX ADMIN — Medication 1 APPLICATION(S): at 06:15

## 2025-06-03 RX ADMIN — METOPROLOL SUCCINATE 100 MILLIGRAM(S): 50 TABLET, EXTENDED RELEASE ORAL at 06:13

## 2025-06-03 RX ADMIN — CLOPIDOGREL BISULFATE 75 MILLIGRAM(S): 75 TABLET, FILM COATED ORAL at 11:45

## 2025-06-03 RX ADMIN — APIXABAN 10 MILLIGRAM(S): 2.5 TABLET, FILM COATED ORAL at 11:56

## 2025-06-03 RX ADMIN — HEPARIN SODIUM 1700 UNIT(S)/HR: 1000 INJECTION INTRAVENOUS; SUBCUTANEOUS at 06:45

## 2025-06-03 RX ADMIN — HEPARIN SODIUM 1700 UNIT(S)/HR: 1000 INJECTION INTRAVENOUS; SUBCUTANEOUS at 07:09

## 2025-06-03 RX ADMIN — Medication 40 MILLIEQUIVALENT(S): at 11:45

## 2025-06-03 RX ADMIN — FUROSEMIDE 40 MILLIGRAM(S): 10 INJECTION INTRAMUSCULAR; INTRAVENOUS at 06:15

## 2025-06-03 RX ADMIN — Medication 81 MILLIGRAM(S): at 11:46

## 2025-06-03 NOTE — DISCHARGE NOTE NURSING/CASE MANAGEMENT/SOCIAL WORK - FINANCIAL ASSISTANCE
[facility] provides services at a reduced cost to those who are determined to be eligible through [facility]’s financial assistance program. Information regarding [facility]’s financial assistance program can be found by going to [email or web address] or by calling [phone].

## 2025-06-03 NOTE — PROGRESS NOTE ADULT - REASON FOR ADMISSION
acute decompensated HF

## 2025-06-03 NOTE — CHART NOTE - NSCHARTNOTESELECT_GEN_ALL_CORE
CTS PA/Event Note
Cardiology/Event Note
Cardiology/Event Note
Event Note
Interventional Cardiology NP/Event Note
Nutrition Services
Pre Cath/Event Note
SICU/Transfer Note
site check/Event Note

## 2025-06-03 NOTE — PROGRESS NOTE ADULT - PROVIDER SPECIALTY LIST ADULT
Cardiology
Cardiology
Hospitalist
Intervent Cardiology
Vascular Surgery
Vascular Surgery
Cardiology
Cardiology
Hospitalist
Hospitalist
SICU
Vascular Surgery
Cardiology
Vascular Surgery
Hospitalist
Vascular Surgery
Hospitalist

## 2025-06-03 NOTE — DISCHARGE NOTE NURSING/CASE MANAGEMENT/SOCIAL WORK - PATIENT PORTAL LINK FT
You can access the FollowMyHealth Patient Portal offered by [facility] by registering at the following website: [email or web address]. By joining [facility]’s FollowMyHealth portal, you will also be able to view your health information using other applications (apps) compatible with our system.

## 2025-06-03 NOTE — CHART NOTE - NSCHARTNOTEFT_GEN_A_CORE
Source: Patient [x ]  Family [x ]   other [x ] chart, staff/IDT rounds     Current Diet:   Diet, DASH/TLC:   Sodium & Cholesterol Restricted (05-30-25 @ 14:01) [Active]    Patient reports [ ] nausea  [ ] vomiting [ ] diarrhea [ ] constipation  [ ]chewing problems [ ] swallowing issues  [ ] other: denies    PO intake:  < 50% [ ]   50-75%  [ ]   %  [x ]  other :    Source for PO intake [ x] Patient [x ] family [ x] chart [ x] staff [ ] other    Current Weight:   6/2 181.8#  5/26 195#    % Weight Change : Unclear accuracy of weight 2/2 inconsistency     Pertinent Medications: MEDICATIONS  (STANDING):  furosemide    Tablet 40 milliGRAM(s) Oral daily  senna 2 Tablet(s) Oral at bedtime    Pertinent Labs: 06-03 Na139 mmol/L Glu 114 mg/dL[H] K+ 3.6 mmol/L Cr  0.92 mg/dL BUN 20.0 mg/dL Phos 3.2 mg/dL     Skin: R ankle abrasion, psoriasis to L buttock     Estimated Needs:   [x ] no change since previous assessment  [ ] recalculated:     Current Nutrition Diagnosis: Increased Nutrient Needs... (protein) related to increased physiological demand of nutrient to promote healing as evidenced by Acute hypoxic respiratory failure 2/2 acute HFrEF    Spoke with pt and family member at bedside this afternoon. Good po intake noted at this time. RD visualized lunch tray, consumed 100% of meal. Pt being discharged today. RD to remain available.     Recommendations:   - Monitor weights for trend/accuracy  - Continue diet as tolerated.  - Rx MVI daily, vit C 500mg    Monitoring and Evaluation:   [x ] PO intake [ x] Tolerance to diet prescription [X] Weights  [X] Follow up per protocol [X] Labs:

## 2025-06-03 NOTE — PROGRESS NOTE ADULT - ASSESSMENT
A/P    Assessment:   65yo here for NSTEMI, medically and vascular surgically complex male with sami 2b ischemia of RLE after a cardiac cath with bilateral femoral access, subsequently aborted after difficulty advancing wire. Found with PSA of right CFA and occlusive thrombus of R Fem AT bypass. Now s/p right thigh and groin exploration, excision of PSA, endarterectomy of CFA and Profunda, and interposition bypass CFA to Graft with side anastomosis of profunda to PTFE as well. This AM feels good  foot perfusing and neurologically improved.  Progressing as expexted. Discharge stable         Plan:   - No further vascular intervention   - diet and tolerating   - Q4 NV checks   - ambulating and voiding freely  - PT: home without PT needs  - Replete  lytes as needed   - Pain control as needed   - continue PLAVIX and  heparin until DOAC approved by pharmacy , once approved will transition    - Dispo planning today

## 2025-06-03 NOTE — PROGRESS NOTE ADULT - SUBJECTIVE AND OBJECTIVE BOX
HPI/Overnight Events:   Patient seen and examined at bedside this AM. No overnight events. No complaints.     Vital Signs Last 24 Hrs  T(C): 36.7 (03 Jun 2025 07:27), Max: 36.9 (02 Jun 2025 19:19)  T(F): 98 (03 Jun 2025 07:27), Max: 98.5 (03 Jun 2025 04:05)  HR: 72 (03 Jun 2025 05:46) (50 - 87)  BP: 156/74 (03 Jun 2025 05:46) (112/98 - 156/74)  BP(mean): 94 (03 Jun 2025 05:46) (83 - 104)  RR: 17 (03 Jun 2025 05:46) (16 - 18)  SpO2: 98% (03 Jun 2025 05:46) (98% - 100%)    Parameters below as of 03 Jun 2025 05:46  Patient On (Oxygen Delivery Method): room air        I&O's Detail    02 Jun 2025 07:01  -  03 Jun 2025 07:00  --------------------------------------------------------  IN:    Heparin Infusion: 408 mL    IV PiggyBack: 100 mL    Oral Fluid: 860 mL  Total IN: 1368 mL    OUT:    Voided (mL): 825 mL  Total OUT: 825 mL    Total NET: 543 mL          LABS:                        10.0   8.21  )-----------( 240      ( 03 Jun 2025 05:56 )             33.2     06-03    139  |  105  |  20.0  ----------------------------<  114[H]  3.6   |  21.0[L]  |  0.92    Ca    8.8      03 Jun 2025 05:56  Phos  3.2     06-03  Mg     2.1     06-03      PTT - ( 03 Jun 2025 05:56 )  PTT:76.1 sec  Urinalysis Basic - ( 03 Jun 2025 05:56 )    Color: x / Appearance: x / SG: x / pH: x  Gluc: 114 mg/dL / Ketone: x  / Bili: x / Urobili: x   Blood: x / Protein: x / Nitrite: x   Leuk Esterase: x / RBC: x / WBC x   Sq Epi: x / Non Sq Epi: x / Bacteria: x          Physical Exam  Constitutional: patient resting comfortably in bed, in no acute distress  Respiratory: non-labored breathing on RA  Cardiovascular: RRR  Gastrointestinal: abdomen soft, non-tender, non-distended, no rebound tenderness/guarding  Right groin : mild eccymosis and swelling and tender to palpation   staples overlying no drainage.   Vascular: RLE- DP/PT/AT signal and signal over the bypass   left palp dp     MEDICATIONS  (STANDING):  aspirin  chewable 81 milliGRAM(s) Oral daily  atorvastatin 80 milliGRAM(s) Oral at bedtime  chlorhexidine 2% Cloths 1 Application(s) Topical <User Schedule>  clopidogrel Tablet 75 milliGRAM(s) Oral daily  fluticasone propionate/ salmeterol 250-50 MICROgram(s) Diskus 1 Dose(s) Inhalation two times a day  furosemide    Tablet 40 milliGRAM(s) Oral daily  heparin  Infusion.  Unit(s)/Hr (15 mL/Hr) IV Continuous <Continuous>  metoprolol succinate  milliGRAM(s) Oral daily  polyethylene glycol 3350 17 Gram(s) Oral daily  senna 2 Tablet(s) Oral at bedtime    MEDICATIONS  (PRN):  albuterol/ipratropium for Nebulization 3 milliLiter(s) Nebulizer every 6 hours PRN Shortness of Breath and/or Wheezing  aluminum hydroxide/magnesium hydroxide/simethicone Suspension 30 milliLiter(s) Oral every 4 hours PRN Dyspepsia  heparin   Injectable 6500 Unit(s) IV Push every 6 hours PRN For aPTT less than 40  heparin   Injectable 3000 Unit(s) IV Push every 6 hours PRN For aPTT between 40 - 57  melatonin 3 milliGRAM(s) Oral at bedtime PRN Insomnia  ondansetron Injectable 4 milliGRAM(s) IV Push every 8 hours PRN Nausea and/or Vomiting        STUDIES:   EKG, CXR, U/S, CT, MRI     MICRO:   Cultures

## 2025-06-04 LAB — SURGICAL PATHOLOGY STUDY: SIGNIFICANT CHANGE UP

## 2025-06-12 ENCOUNTER — APPOINTMENT (OUTPATIENT)
Dept: VASCULAR SURGERY | Facility: CLINIC | Age: 65
End: 2025-06-12
Payer: COMMERCIAL

## 2025-06-12 VITALS
SYSTOLIC BLOOD PRESSURE: 120 MMHG | DIASTOLIC BLOOD PRESSURE: 70 MMHG | BODY MASS INDEX: 28.35 KG/M2 | HEIGHT: 70 IN | OXYGEN SATURATION: 99 % | HEART RATE: 96 BPM | WEIGHT: 198 LBS

## 2025-06-12 PROCEDURE — 99024 POSTOP FOLLOW-UP VISIT: CPT

## 2025-06-12 RX ORDER — LISINOPRIL 10 MG/1
10 TABLET ORAL
Refills: 0 | Status: ACTIVE | COMMUNITY

## 2025-06-12 RX ORDER — ATORVASTATIN CALCIUM 80 MG/1
80 TABLET, FILM COATED ORAL
Refills: 0 | Status: ACTIVE | COMMUNITY

## 2025-06-12 RX ORDER — APIXABAN 5 MG/1
TABLET, FILM COATED ORAL
Refills: 0 | Status: ACTIVE | COMMUNITY

## 2025-06-30 ENCOUNTER — APPOINTMENT (OUTPATIENT)
Dept: VASCULAR SURGERY | Facility: CLINIC | Age: 65
End: 2025-06-30

## (undated) DEVICE — SOL BAG NS 0.9% 1000ML

## (undated) DEVICE — DRAPE 3/4 SHEET 52X76"

## (undated) DEVICE — WARMING BLANKET UPPER ADULT

## (undated) DEVICE — DRSG DERMABOND 0.7ML

## (undated) DEVICE — SYR CONTROL LUER LOK 10CC

## (undated) DEVICE — SOL IRR POUR NS 0.9% 1000ML

## (undated) DEVICE — PACK VASCULAR

## (undated) DEVICE — VENODYNE/SCD SLEEVE CALF MEDIUM

## (undated) DEVICE — TORQUE DEVICE FOR GUIDEWIRE 0.0100.038"

## (undated) DEVICE — DRAPE XL SHEET 77X98"

## (undated) DEVICE — DRAPE FEMORAL ANGIOGRAPHY W TROUGH

## (undated) DEVICE — DRAPE DOME BAG 22"

## (undated) DEVICE — NDL HYPO REGULAR BEVEL 25G X 1.5" (BLUE)

## (undated) DEVICE — GLV 7 PROTEXIS ORTHO (BROWN)

## (undated) DEVICE — DRAPE C ARM UNIVERSAL

## (undated) DEVICE — SOL IRR POUR H2O 1000ML